# Patient Record
Sex: MALE | Race: WHITE | NOT HISPANIC OR LATINO
[De-identification: names, ages, dates, MRNs, and addresses within clinical notes are randomized per-mention and may not be internally consistent; named-entity substitution may affect disease eponyms.]

---

## 2018-11-13 PROBLEM — Z00.00 ENCOUNTER FOR PREVENTIVE HEALTH EXAMINATION: Status: ACTIVE | Noted: 2018-11-13

## 2018-11-21 ENCOUNTER — RECORD ABSTRACTING (OUTPATIENT)
Age: 78
End: 2018-11-21

## 2018-11-21 ENCOUNTER — APPOINTMENT (OUTPATIENT)
Dept: INTERNAL MEDICINE | Facility: CLINIC | Age: 78
End: 2018-11-21
Payer: MEDICARE

## 2018-11-21 VITALS — BODY MASS INDEX: 26.53 KG/M2 | HEIGHT: 67 IN | WEIGHT: 169 LBS

## 2018-11-21 VITALS — DIASTOLIC BLOOD PRESSURE: 74 MMHG | SYSTOLIC BLOOD PRESSURE: 125 MMHG

## 2018-11-21 DIAGNOSIS — Z87.09 PERSONAL HISTORY OF OTHER DISEASES OF THE RESPIRATORY SYSTEM: ICD-10-CM

## 2018-11-21 DIAGNOSIS — Z87.39 PERSONAL HISTORY OF OTHER DISEASES OF THE MUSCULOSKELETAL SYSTEM AND CONNECTIVE TISSUE: ICD-10-CM

## 2018-11-21 DIAGNOSIS — M10.9 GOUT, UNSPECIFIED: ICD-10-CM

## 2018-11-21 DIAGNOSIS — Z87.898 PERSONAL HISTORY OF OTHER SPECIFIED CONDITIONS: ICD-10-CM

## 2018-11-21 DIAGNOSIS — Z87.828 PERSONAL HISTORY OF OTHER (HEALED) PHYSICAL INJURY AND TRAUMA: ICD-10-CM

## 2018-11-21 DIAGNOSIS — Z82.49 FAMILY HISTORY OF ISCHEMIC HEART DISEASE AND OTHER DISEASES OF THE CIRCULATORY SYSTEM: ICD-10-CM

## 2018-11-21 DIAGNOSIS — M25.539 PAIN IN UNSPECIFIED WRIST: ICD-10-CM

## 2018-11-21 DIAGNOSIS — B02.30 ZOSTER OCULAR DISEASE, UNSPECIFIED: ICD-10-CM

## 2018-11-21 DIAGNOSIS — Z86.39 PERSONAL HISTORY OF OTHER ENDOCRINE, NUTRITIONAL AND METABOLIC DISEASE: ICD-10-CM

## 2018-11-21 DIAGNOSIS — R07.89 OTHER CHEST PAIN: ICD-10-CM

## 2018-11-21 DIAGNOSIS — Z86.69 PERSONAL HISTORY OF OTHER DISEASES OF THE NERVOUS SYSTEM AND SENSE ORGANS: ICD-10-CM

## 2018-11-21 DIAGNOSIS — Z87.891 PERSONAL HISTORY OF NICOTINE DEPENDENCE: ICD-10-CM

## 2018-11-21 DIAGNOSIS — Z86.79 PERSONAL HISTORY OF OTHER DISEASES OF THE CIRCULATORY SYSTEM: ICD-10-CM

## 2018-11-21 LAB — INR PPP: 2 RATIO

## 2018-11-21 PROCEDURE — 99214 OFFICE O/P EST MOD 30 MIN: CPT | Mod: 25

## 2018-11-21 PROCEDURE — 85610 PROTHROMBIN TIME: CPT | Mod: QW

## 2018-11-21 RX ORDER — TRAZODONE HYDROCHLORIDE 50 MG/1
50 TABLET ORAL
Refills: 0 | Status: COMPLETED | COMMUNITY
End: 2018-11-21

## 2018-11-21 RX ORDER — TIOTROPIUM BROMIDE 18 UG/1
18 CAPSULE ORAL; RESPIRATORY (INHALATION) DAILY
Refills: 0 | Status: COMPLETED | COMMUNITY
End: 2018-11-21

## 2018-11-21 RX ORDER — DIFLORASONE DIACETATE 0.5 MG/G
0.05 CREAM TOPICAL
Refills: 0 | Status: ACTIVE | COMMUNITY

## 2018-11-21 RX ORDER — CLOBETASOL PROPIONATE 0.5 MG/G
0.05 CREAM TOPICAL
Refills: 0 | Status: ACTIVE | COMMUNITY

## 2018-11-21 RX ORDER — FLUOCINONIDE 0.5 MG/ML
0.05 SOLUTION TOPICAL TWICE DAILY
Refills: 0 | Status: ACTIVE | COMMUNITY

## 2018-11-21 RX ORDER — ALPRAZOLAM 0.5 MG/1
0.5 TABLET ORAL TWICE DAILY
Refills: 0 | Status: COMPLETED | COMMUNITY
End: 2018-11-21

## 2018-11-21 RX ORDER — SECUKINUMAB 150 MG/ML
150 INJECTION SUBCUTANEOUS
Qty: 6 | Refills: 0 | Status: ACTIVE | COMMUNITY
Start: 2018-02-05

## 2018-11-21 RX ORDER — SILDENAFIL 20 MG/1
20 TABLET ORAL
Refills: 0 | Status: COMPLETED | COMMUNITY
End: 2018-11-21

## 2018-11-21 RX ORDER — ALBUTEROL SULFATE 0.63 MG/3ML
0.63 SOLUTION RESPIRATORY (INHALATION)
Refills: 0 | Status: COMPLETED | COMMUNITY
End: 2018-11-21

## 2018-11-21 RX ORDER — SOTALOL HYDROCHLORIDE 80 MG/1
80 TABLET ORAL
Refills: 0 | Status: COMPLETED | COMMUNITY
End: 2018-11-21

## 2018-11-21 NOTE — REVIEW OF SYSTEMS
[Nocturia] : nocturia [Joint Stiffness] : joint stiffness [Back Pain] : back pain [Negative] : Heme/Lymph [Frequency] : no frequency

## 2018-11-21 NOTE — ASSESSMENT
[FreeTextEntry1] : \par Complicated patient who has been doing fairly well despite many concurrent medical issues\par He has no signs of congestive heart failure on exam and his blood pressure has been well-controlled and he feels fairly well\par His asthma and COPD has not been limiting him in any way\par His psoriasis is under much better control\par The details of his visit after the pacemaker interrogation are not yet known

## 2018-11-21 NOTE — PLAN
[FreeTextEntry1] : Continue same medications at this time\par Obtain echocardiogram to evaluate ejection fraction to determine if a defibrillator or dual-chamber pacemaker might be indicated\par Followup with Dr. Kanner\par Will obtain records from yesterday's visit which are not available at this time\par In the meantime he should follow Dr. Kanner's recommendations and stop the sotalol and take the metoprolol at the doses that were prescribed

## 2018-11-21 NOTE — PHYSICAL EXAM
[No Acute Distress] : no acute distress [Well Nourished] : well nourished [Well Developed] : well developed [Well-Appearing] : well-appearing [Normal Sclera/Conjunctiva] : normal sclera/conjunctiva [PERRL] : pupils equal round and reactive to light [Normal Outer Ear/Nose] : the outer ears and nose were normal in appearance [Normal Oropharynx] : the oropharynx was normal [No JVD] : no jugular venous distention [Supple] : supple [No Lymphadenopathy] : no lymphadenopathy [Thyroid Normal, No Nodules] : the thyroid was normal and there were no nodules present [No Respiratory Distress] : no respiratory distress  [Clear to Auscultation] : lungs were clear to auscultation bilaterally [No Accessory Muscle Use] : no accessory muscle use [Normal Rate] : normal rate  [Premature Beats] : regular with premature beats [Normal S1] : normal S1 [Distant] : the heart sounds were distant [I] : a grade 1 [No Carotid Bruits] : no carotid bruits [No Abdominal Bruit] : a ~M bruit was not heard ~T in the abdomen [No Varicosities] : no varicosities [Pedal Pulses Present] : the pedal pulses are present [No Extremity Clubbing/Cyanosis] : no extremity clubbing/cyanosis [No Palpable Aorta] : no palpable aorta [___ +] : bilateral [unfilled]U+ pretibial pitting edema [Soft] : abdomen soft [Non Tender] : non-tender [Non-distended] : non-distended [No Masses] : no abdominal mass palpated [No HSM] : no HSM [Normal Bowel Sounds] : normal bowel sounds [Normal Posterior Cervical Nodes] : no posterior cervical lymphadenopathy [Normal Anterior Cervical Nodes] : no anterior cervical lymphadenopathy [No CVA Tenderness] : no CVA  tenderness [No Spinal Tenderness] : no spinal tenderness [No Joint Swelling] : no joint swelling [Grossly Normal Strength/Tone] : grossly normal strength/tone [Normal Gait] : normal gait [Coordination Grossly Intact] : coordination grossly intact [No Focal Deficits] : no focal deficits [Deep Tendon Reflexes (DTR)] : deep tendon reflexes were 2+ and symmetric [Normal Affect] : the affect was normal [Normal Insight/Judgement] : insight and judgment were intact [Normal S2] : abnormal S2 [de-identified] : psoriasis, venous stasis changes

## 2018-11-21 NOTE — HISTORY OF PRESENT ILLNESS
[FreeTextEntry1] : upset over visit with Dr Kanner [de-identified] : sotalol stopped, metoprolol 50 started\par otherwise at baseline\par The patient was in his baseline state of health and going for his routine pacemaker followup which had been putting years ago for sick sinus syndrome and is followed at [facility] by Dr. Kanner\par He has been doing well on his current regimen and his past medical history is complicated and includes significant COPD and asthma that is well controlled on his current regimen\par He also has a history of bad psoriasis and gets injections and has been doing better since the injections\par He has a long history of coronary artery disease and has had prior myocardial infarction and stents and most recently he had a 90% large circumflex lesion that was stented and at that time his ejection fraction was 30%\par He has renal insufficiency that has been stable\par He has difficult to control hypertension that has been under good control\par He has paroxysmal atrial fibrillation and his INR has been well controlled and is at a therapeutic range today\par Unfortunately the records from his visit with Dr. Kanner yesterday are not available and we have called to try to get them\par He was told that he might need a different type of pacemaker and his medications were switched as above and it was recommended that he have an echocardiogram here\par Dr. Kanner had discussed that he might need a defibrillator and dual-chamber pacemaker should his ejection fraction failed to improve from when he had the stent and this most likely is what he wanted to have evaluated

## 2018-11-26 ENCOUNTER — APPOINTMENT (OUTPATIENT)
Dept: INTERNAL MEDICINE | Facility: CLINIC | Age: 78
End: 2018-11-26
Payer: MEDICARE

## 2018-11-26 PROCEDURE — 93306 TTE W/DOPPLER COMPLETE: CPT

## 2018-11-28 ENCOUNTER — APPOINTMENT (OUTPATIENT)
Dept: INTERNAL MEDICINE | Facility: CLINIC | Age: 78
End: 2018-11-28
Payer: MEDICARE

## 2018-11-28 VITALS — HEIGHT: 67 IN | SYSTOLIC BLOOD PRESSURE: 126 MMHG | BODY MASS INDEX: 26.31 KG/M2 | DIASTOLIC BLOOD PRESSURE: 76 MMHG

## 2018-11-28 VITALS — WEIGHT: 168 LBS | BODY MASS INDEX: 26.31 KG/M2

## 2018-11-28 LAB — INR PPP: 3.5 RATIO

## 2018-11-28 PROCEDURE — 99214 OFFICE O/P EST MOD 30 MIN: CPT | Mod: 25

## 2018-11-28 PROCEDURE — 85610 PROTHROMBIN TIME: CPT | Mod: QW

## 2018-11-28 NOTE — PHYSICAL EXAM
[No Acute Distress] : no acute distress [Well Nourished] : well nourished [Well Developed] : well developed [Well-Appearing] : well-appearing [Normal Sclera/Conjunctiva] : normal sclera/conjunctiva [PERRL] : pupils equal round and reactive to light [Normal Outer Ear/Nose] : the outer ears and nose were normal in appearance [Normal Oropharynx] : the oropharynx was normal [No JVD] : no jugular venous distention [Supple] : supple [No Lymphadenopathy] : no lymphadenopathy [Thyroid Normal, No Nodules] : the thyroid was normal and there were no nodules present [No Respiratory Distress] : no respiratory distress  [Clear to Auscultation] : lungs were clear to auscultation bilaterally [No Accessory Muscle Use] : no accessory muscle use [Normal Rate] : normal rate  [Premature Beats] : regular with premature beats [Normal S1] : normal S1 [Distant] : the heart sounds were distant [I] : a grade 1 [No Carotid Bruits] : no carotid bruits [No Abdominal Bruit] : a ~M bruit was not heard ~T in the abdomen [No Varicosities] : no varicosities [Pedal Pulses Present] : the pedal pulses are present [No Extremity Clubbing/Cyanosis] : no extremity clubbing/cyanosis [No Palpable Aorta] : no palpable aorta [___ +] : bilateral [unfilled]U+ pretibial pitting edema [Soft] : abdomen soft [Non Tender] : non-tender [Non-distended] : non-distended [No Masses] : no abdominal mass palpated [No HSM] : no HSM [Normal Bowel Sounds] : normal bowel sounds [Normal Sphincter Tone] : normal sphincter tone [No Mass] : no mass [Normal Posterior Cervical Nodes] : no posterior cervical lymphadenopathy [Normal Anterior Cervical Nodes] : no anterior cervical lymphadenopathy [No CVA Tenderness] : no CVA  tenderness [No Spinal Tenderness] : no spinal tenderness [No Joint Swelling] : no joint swelling [Grossly Normal Strength/Tone] : grossly normal strength/tone [Normal Gait] : normal gait [Coordination Grossly Intact] : coordination grossly intact [No Focal Deficits] : no focal deficits [Deep Tendon Reflexes (DTR)] : deep tendon reflexes were 2+ and symmetric [Normal Affect] : the affect was normal [Normal Insight/Judgement] : insight and judgment were intact [Normal S2] : abnormal S2 [Stool Occult Blood] : stool negative for occult blood [de-identified] : psoriasis, venous stasis changes

## 2018-11-28 NOTE — REVIEW OF SYSTEMS
[Dyspnea on Exertion] : dyspnea on exertion [Diarrhea] : diarrhea [Nocturia] : nocturia [Joint Stiffness] : joint stiffness [Back Pain] : back pain [Negative] : Heme/Lymph [Shortness Of Breath] : no shortness of breath [Wheezing] : no wheezing [Cough] : no cough [Abdominal Pain] : no abdominal pain [Nausea] : no nausea [Constipation] : no constipation [Vomiting] : no vomiting [Heartburn] : no heartburn [Melena] : no melena [Frequency] : no frequency

## 2018-11-28 NOTE — HISTORY OF PRESENT ILLNESS
[FreeTextEntry1] : diarrhea, dyspnea [de-identified] : The patient notes that over the last 6 days he has felt poorly with more shortness of breath on exertion although this hasn't been too bad as well as GI upset and diarrhea\par He says that the diarrhea can be watery and occurs 4-5 times a day\par He denies any recent travel or any suspicious meals and any restaurants or elsewhere\par He feels that this began one day after he started the metoprolol and stopped the sotalol\par His appetite has been okay and he denies abdominal pain other than some cramping when he gets the episodes of diarrhea\par He has remained well hydrated\par He states that he has not had any recent antibiotics\par He has a complex long extensive history of COPD which has been well-controlled as well as psoriasis for which he gets injections and a long history of coronary artery disease and past myocardial infarction\par Most recently he had a stent to a large 90% circumflex lesion and his ejection fraction at that time was 30%\par He underwent an echocardiogram a few days ago in which his ejection fraction was estimated at 35-40% with significant lack of synchronous it he in the ventricular contraction\par He sees Dr. Kanner for his pacemaker followup\par He denies chest pain or angina\par His pacemaker was put in for sick sinus syndrome several years ago\par His INR is 3.5 today and he denies any recent antibiotic usage

## 2018-11-28 NOTE — ASSESSMENT
[FreeTextEntry1] : Diarrhea in an adult patient that he feels coincides with his starting metoprolol but I suspect that that is a coincidence\par He is not ill-appearing and is not dehydrated with good skin turgor and good appetite\par He has not had any recent antibiotics\par His lungs are clear and his asthma and COPD have been very well controlled on his current regimen\par He does not appear to be in congestive heart failure and has no angina\par The reason that his sotalol was changed to metoprolol is still not been communicated to me with certainty\par His INR is high likely related to his diarrhea

## 2018-11-28 NOTE — PLAN
[FreeTextEntry1] : Continue current medications for now\par Send off stool tests to look for cause of diarrhea\par [place] diet for the next day or 2\par If fails to improve will stop metoprolol and resume sotalol\par Get results from Dr. Kanner these were requested last week but have not yet been received\par Reduce warfarin over the next week patient to hold her  tonight and then resume low schedule and come back in 5 days to be reevaluated for his protime\par

## 2018-11-30 ENCOUNTER — RX CHANGE (OUTPATIENT)
Age: 78
End: 2018-11-30

## 2018-11-30 LAB — GI PCR PANEL, STOOL: NORMAL

## 2018-11-30 RX ORDER — METOPROLOL SUCCINATE 50 MG/1
50 TABLET, EXTENDED RELEASE ORAL
Qty: 30 | Refills: 0 | Status: COMPLETED | COMMUNITY
Start: 2018-11-20 | End: 2018-11-30

## 2018-12-03 ENCOUNTER — APPOINTMENT (OUTPATIENT)
Dept: INTERNAL MEDICINE | Facility: CLINIC | Age: 78
End: 2018-12-03
Payer: MEDICARE

## 2018-12-03 VITALS — WEIGHT: 164 LBS | HEIGHT: 67 IN | BODY MASS INDEX: 25.74 KG/M2

## 2018-12-03 VITALS — SYSTOLIC BLOOD PRESSURE: 130 MMHG | DIASTOLIC BLOOD PRESSURE: 70 MMHG

## 2018-12-03 DIAGNOSIS — R19.7 DIARRHEA, UNSPECIFIED: ICD-10-CM

## 2018-12-03 LAB — INR PPP: 3 RATIO

## 2018-12-03 PROCEDURE — 85610 PROTHROMBIN TIME: CPT | Mod: QW

## 2018-12-03 PROCEDURE — 99214 OFFICE O/P EST MOD 30 MIN: CPT | Mod: 25

## 2018-12-03 NOTE — PLAN
[FreeTextEntry1] : Continue Coumadin at prior dosage of one half pill 3 times a week\par Continue all other medications\par No longer give patient metoprolol to do his inability to tolerate this medication\par Continue other medications same dosages\par Recheck INR next week

## 2018-12-03 NOTE — ASSESSMENT
[FreeTextEntry1] : GI symptoms of diarrhea and other symptoms the patient developed all resolved immediately upon stopping the metoprolol and restarting his sotalol at the prior dose INR is 3.0 today\par Dyspnea on exertion is a bit better but still present\par Records from Dr. Kanner were received\par Blood pressure is well controlled\par COPD stable\par Question remains as to whether biventricular pacing would be beneficial

## 2018-12-03 NOTE — PHYSICAL EXAM
[No Acute Distress] : no acute distress [Well Nourished] : well nourished [Well Developed] : well developed [Well-Appearing] : well-appearing [Normal Sclera/Conjunctiva] : normal sclera/conjunctiva [PERRL] : pupils equal round and reactive to light [Normal Outer Ear/Nose] : the outer ears and nose were normal in appearance [Normal Oropharynx] : the oropharynx was normal [No JVD] : no jugular venous distention [Supple] : supple [No Lymphadenopathy] : no lymphadenopathy [Thyroid Normal, No Nodules] : the thyroid was normal and there were no nodules present [No Respiratory Distress] : no respiratory distress  [Clear to Auscultation] : lungs were clear to auscultation bilaterally [No Accessory Muscle Use] : no accessory muscle use [Normal Rate] : normal rate  [Premature Beats] : regular with premature beats [Normal S1] : normal S1 [Distant] : the heart sounds were distant [I] : a grade 1 [No Carotid Bruits] : no carotid bruits [No Abdominal Bruit] : a ~M bruit was not heard ~T in the abdomen [No Varicosities] : no varicosities [Pedal Pulses Present] : the pedal pulses are present [No Extremity Clubbing/Cyanosis] : no extremity clubbing/cyanosis [No Palpable Aorta] : no palpable aorta [___ +] : bilateral [unfilled]U+ pretibial pitting edema [Soft] : abdomen soft [Non Tender] : non-tender [Non-distended] : non-distended [No Masses] : no abdominal mass palpated [No HSM] : no HSM [Normal Bowel Sounds] : normal bowel sounds [Normal Posterior Cervical Nodes] : no posterior cervical lymphadenopathy [Normal Anterior Cervical Nodes] : no anterior cervical lymphadenopathy [No CVA Tenderness] : no CVA  tenderness [No Spinal Tenderness] : no spinal tenderness [No Joint Swelling] : no joint swelling [Grossly Normal Strength/Tone] : grossly normal strength/tone [Normal Gait] : normal gait [Coordination Grossly Intact] : coordination grossly intact [No Focal Deficits] : no focal deficits [Deep Tendon Reflexes (DTR)] : deep tendon reflexes were 2+ and symmetric [Normal Affect] : the affect was normal [Normal Insight/Judgement] : insight and judgment were intact [Normal S2] : abnormal S2 [Stool Occult Blood] : stool negative for occult blood [de-identified] : psoriasis, venous stasis changes

## 2018-12-03 NOTE — HISTORY OF PRESENT ILLNESS
[FreeTextEntry1] : The patient is here today to follow up on multiple ongoing condition [de-identified] : The patient felt worse with more shortness of breath on exertion and gastrointestinal upset and diarrhea since his sotalol was changed to metoprolol by Dr. Kanner\par He had a GI PCR test that was negative\par He has had no recent antibiotics or any recent travel\par He has a long extensive history of COPD which has been well controlled on his current regimen and he sees a lung specialist\par He has a long history of coronary artery disease and past myocardial infarction and most recently in April of this year had a stent to a large 90% circumflex lesion with an ejection fraction at that time estimated at 30%\par An echocardiogram done within the last 2 weeks estimated ejection fraction of 35-40% although there was a significant lack of synchronicity of the ventricular contraction\par He has a history of renal insufficiency which has been stable\par INR 3.0 today on warfarin for past PAF

## 2018-12-03 NOTE — REVIEW OF SYSTEMS
[Nocturia] : nocturia [Joint Stiffness] : joint stiffness [Back Pain] : back pain [Negative] : Heme/Lymph [Dyspnea on Exertion] : dyspnea on exertion [Shortness Of Breath] : no shortness of breath [Wheezing] : no wheezing [Cough] : no cough [Abdominal Pain] : no abdominal pain [Nausea] : no nausea [Constipation] : no constipation [Diarrhea] : no diarrhea [Vomiting] : no vomiting [Heartburn] : no heartburn [Melena] : no melena [Frequency] : no frequency [FreeTextEntry6] : Mild BIANCHI

## 2018-12-07 ENCOUNTER — APPOINTMENT (OUTPATIENT)
Dept: INTERNAL MEDICINE | Facility: CLINIC | Age: 78
End: 2018-12-07

## 2018-12-14 ENCOUNTER — APPOINTMENT (OUTPATIENT)
Dept: INTERNAL MEDICINE | Facility: CLINIC | Age: 78
End: 2018-12-14

## 2018-12-19 ENCOUNTER — APPOINTMENT (OUTPATIENT)
Dept: INTERNAL MEDICINE | Facility: CLINIC | Age: 78
End: 2018-12-19
Payer: MEDICARE

## 2018-12-19 VITALS
RESPIRATION RATE: 20 BRPM | HEART RATE: 68 BPM | DIASTOLIC BLOOD PRESSURE: 61 MMHG | TEMPERATURE: 97.5 F | OXYGEN SATURATION: 95 % | SYSTOLIC BLOOD PRESSURE: 147 MMHG

## 2018-12-19 VITALS — HEIGHT: 67 IN | WEIGHT: 163 LBS | BODY MASS INDEX: 25.58 KG/M2

## 2018-12-19 DIAGNOSIS — M54.16 RADICULOPATHY, LUMBAR REGION: ICD-10-CM

## 2018-12-19 LAB — INR PPP: 2.1 RATIO

## 2018-12-19 PROCEDURE — 85610 PROTHROMBIN TIME: CPT | Mod: QW

## 2018-12-19 PROCEDURE — 99214 OFFICE O/P EST MOD 30 MIN: CPT | Mod: 25

## 2018-12-19 PROCEDURE — 36415 COLL VENOUS BLD VENIPUNCTURE: CPT

## 2018-12-19 NOTE — ASSESSMENT
[FreeTextEntry1] : Patient looks well but feels overall fatigued and lack of energy and not as good as he like to feel\par He has no signs of congestive heart failure on exam\par His blood pressure is slightly high and he does not seem to be overmedicated\par His lungs are clear and his asthma seems to be in active\par He is back on his sotalol

## 2018-12-19 NOTE — PLAN
[FreeTextEntry1] : Continue same dose of warfarin\par Check bloods\par Suggest nuclear MUGA to get ejection fraction to help determine if biventricular pacing might help improve his functional status by improving his cardiac output

## 2018-12-19 NOTE — PHYSICAL EXAM
[No Acute Distress] : no acute distress [Well Nourished] : well nourished [Well Developed] : well developed [Well-Appearing] : well-appearing [Normal Sclera/Conjunctiva] : normal sclera/conjunctiva [PERRL] : pupils equal round and reactive to light [Normal Outer Ear/Nose] : the outer ears and nose were normal in appearance [Normal Oropharynx] : the oropharynx was normal [No JVD] : no jugular venous distention [Supple] : supple [No Lymphadenopathy] : no lymphadenopathy [Thyroid Normal, No Nodules] : the thyroid was normal and there were no nodules present [No Respiratory Distress] : no respiratory distress  [Clear to Auscultation] : lungs were clear to auscultation bilaterally [No Accessory Muscle Use] : no accessory muscle use [Normal Rate] : normal rate  [Premature Beats] : regular with premature beats [Normal S1] : normal S1 [Distant] : the heart sounds were distant [I] : a grade 1 [No Carotid Bruits] : no carotid bruits [No Abdominal Bruit] : a ~M bruit was not heard ~T in the abdomen [No Varicosities] : no varicosities [Pedal Pulses Present] : the pedal pulses are present [No Extremity Clubbing/Cyanosis] : no extremity clubbing/cyanosis [No Palpable Aorta] : no palpable aorta [___ +] : bilateral [unfilled]U+ pretibial pitting edema [Soft] : abdomen soft [Non Tender] : non-tender [Non-distended] : non-distended [No Masses] : no abdominal mass palpated [No HSM] : no HSM [Normal Bowel Sounds] : normal bowel sounds [Normal Posterior Cervical Nodes] : no posterior cervical lymphadenopathy [Normal Anterior Cervical Nodes] : no anterior cervical lymphadenopathy [No CVA Tenderness] : no CVA  tenderness [No Spinal Tenderness] : no spinal tenderness [No Joint Swelling] : no joint swelling [Grossly Normal Strength/Tone] : grossly normal strength/tone [Normal Gait] : normal gait [Coordination Grossly Intact] : coordination grossly intact [No Focal Deficits] : no focal deficits [Deep Tendon Reflexes (DTR)] : deep tendon reflexes were 2+ and symmetric [Normal Affect] : the affect was normal [Normal Insight/Judgement] : insight and judgment were intact [Normal S2] : abnormal S2 [Stool Occult Blood] : stool negative for occult blood [de-identified] : psoriasis, venous stasis changes

## 2018-12-19 NOTE — REVIEW OF SYSTEMS
[Dyspnea on Exertion] : dyspnea on exertion [Nocturia] : nocturia [Joint Stiffness] : joint stiffness [Back Pain] : back pain [Negative] : Heme/Lymph [Shortness Of Breath] : no shortness of breath [Wheezing] : no wheezing [Cough] : no cough [Abdominal Pain] : no abdominal pain [Nausea] : no nausea [Constipation] : no constipation [Diarrhea] : no diarrhea [Vomiting] : no vomiting [Heartburn] : no heartburn [Melena] : no melena [Frequency] : no frequency [FreeTextEntry6] : Mild BIANCHI

## 2018-12-19 NOTE — HISTORY OF PRESENT ILLNESS
[FreeTextEntry1] : here today to follow up on multiple ongoing conditions [de-identified] : felt worse with more shortness of breath on exertion and gastrointestinal upset and diarrhea since his sotalol was changed to metoprolol by Dr. Kanner, had a GI PCR test that was negative, had no recent antibiotics or any recent travel, All symptoms resolved when his metoprolol was switched back to social\par  long history of COPD and asthma which has been well controlled on his current regimen and he sees a lung specialist\par history of coronary artery disease and past myocardial infarction and most recently in April of this year had a stent to a large 90% circumflex lesion with an ejection fraction at that time estimated at 30%\par An echocardiogram done estimated ejection fraction of 35-40% although there was a significant lack of synchronicity of the ventricular contraction\par history of renal insufficiency which has been stable\par INR 2.1 today on warfarin for past PAF

## 2018-12-20 ENCOUNTER — RESULT REVIEW (OUTPATIENT)
Age: 78
End: 2018-12-20

## 2018-12-20 LAB
ALBUMIN SERPL ELPH-MCNC: 4.1 G/DL
ALP BLD-CCNC: 79 U/L
ALT SERPL-CCNC: 14 U/L
ANION GAP SERPL CALC-SCNC: 14 MMOL/L
AST SERPL-CCNC: 17 U/L
BASOPHILS # BLD AUTO: 0.01 K/UL
BASOPHILS NFR BLD AUTO: 0.1 %
BILIRUB SERPL-MCNC: 0.3 MG/DL
BUN SERPL-MCNC: 43 MG/DL
CALCIUM SERPL-MCNC: 9.3 MG/DL
CHLORIDE SERPL-SCNC: 105 MMOL/L
CO2 SERPL-SCNC: 27 MMOL/L
CREAT SERPL-MCNC: 1.84 MG/DL
EOSINOPHIL # BLD AUTO: 0.48 K/UL
EOSINOPHIL NFR BLD AUTO: 6.8 %
GLUCOSE SERPL-MCNC: 99 MG/DL
HCT VFR BLD CALC: 36.1 %
HGB BLD-MCNC: 11.4 G/DL
IMM GRANULOCYTES NFR BLD AUTO: 0.1 %
LYMPHOCYTES # BLD AUTO: 0.99 K/UL
LYMPHOCYTES NFR BLD AUTO: 14 %
MAN DIFF?: NORMAL
MCHC RBC-ENTMCNC: 29.5 PG
MCHC RBC-ENTMCNC: 31.6 GM/DL
MCV RBC AUTO: 93.5 FL
MONOCYTES # BLD AUTO: 0.55 K/UL
MONOCYTES NFR BLD AUTO: 7.8 %
NEUTROPHILS # BLD AUTO: 5.03 K/UL
NEUTROPHILS NFR BLD AUTO: 71.2 %
NT-PROBNP SERPL-MCNC: 4905 PG/ML
PLATELET # BLD AUTO: 215 K/UL
POTASSIUM SERPL-SCNC: 4.7 MMOL/L
PROT SERPL-MCNC: 6.5 G/DL
RBC # BLD: 3.86 M/UL
RBC # FLD: 14.3 %
SODIUM SERPL-SCNC: 146 MMOL/L
TSH SERPL-ACNC: 3.46 UIU/ML
WBC # FLD AUTO: 7.07 K/UL

## 2019-01-23 ENCOUNTER — APPOINTMENT (OUTPATIENT)
Dept: INTERNAL MEDICINE | Facility: CLINIC | Age: 79
End: 2019-01-23
Payer: MEDICARE

## 2019-01-23 VITALS — SYSTOLIC BLOOD PRESSURE: 130 MMHG | DIASTOLIC BLOOD PRESSURE: 70 MMHG

## 2019-01-23 VITALS — WEIGHT: 164 LBS | HEIGHT: 67 IN | BODY MASS INDEX: 25.74 KG/M2

## 2019-01-23 LAB — INR PPP: 2.1 RATIO

## 2019-01-23 PROCEDURE — 85610 PROTHROMBIN TIME: CPT | Mod: QW

## 2019-01-23 PROCEDURE — 99214 OFFICE O/P EST MOD 30 MIN: CPT | Mod: 25

## 2019-01-23 NOTE — PHYSICAL EXAM
[No Acute Distress] : no acute distress [Well Nourished] : well nourished [Well Developed] : well developed [Well-Appearing] : well-appearing [Normal Sclera/Conjunctiva] : normal sclera/conjunctiva [PERRL] : pupils equal round and reactive to light [Normal Outer Ear/Nose] : the outer ears and nose were normal in appearance [Normal Oropharynx] : the oropharynx was normal [No JVD] : no jugular venous distention [Supple] : supple [No Lymphadenopathy] : no lymphadenopathy [Thyroid Normal, No Nodules] : the thyroid was normal and there were no nodules present [No Respiratory Distress] : no respiratory distress  [Clear to Auscultation] : lungs were clear to auscultation bilaterally [No Accessory Muscle Use] : no accessory muscle use [Normal Rate] : normal rate  [Premature Beats] : regular with premature beats [Normal S1] : normal S1 [Distant] : the heart sounds were distant [I] : a grade 1 [No Carotid Bruits] : no carotid bruits [No Abdominal Bruit] : a ~M bruit was not heard ~T in the abdomen [No Varicosities] : no varicosities [Pedal Pulses Present] : the pedal pulses are present [No Extremity Clubbing/Cyanosis] : no extremity clubbing/cyanosis [No Palpable Aorta] : no palpable aorta [___ +] : bilateral [unfilled]U+ pretibial pitting edema [Soft] : abdomen soft [Non Tender] : non-tender [Non-distended] : non-distended [No Masses] : no abdominal mass palpated [No HSM] : no HSM [Normal Bowel Sounds] : normal bowel sounds [Normal Posterior Cervical Nodes] : no posterior cervical lymphadenopathy [Normal Anterior Cervical Nodes] : no anterior cervical lymphadenopathy [No CVA Tenderness] : no CVA  tenderness [No Spinal Tenderness] : no spinal tenderness [No Joint Swelling] : no joint swelling [Grossly Normal Strength/Tone] : grossly normal strength/tone [Normal Gait] : normal gait [Coordination Grossly Intact] : coordination grossly intact [No Focal Deficits] : no focal deficits [Deep Tendon Reflexes (DTR)] : deep tendon reflexes were 2+ and symmetric [Normal Affect] : the affect was normal [Normal Insight/Judgement] : insight and judgment were intact [Normal S2] : abnormal S2 [Stool Occult Blood] : stool negative for occult blood [de-identified] : psoriasis, venous stasis changes

## 2019-01-23 NOTE — ASSESSMENT
[FreeTextEntry1] : looks well \par  no signs of congestive heart failure on exam\par blood pressure is excellent\par Has COPD, contributes to dyspnea\par INR perfect

## 2019-01-23 NOTE — HISTORY OF PRESENT ILLNESS
[FreeTextEntry1] : here today to follow up multiple ongoing conditions [de-identified] :  shortness of breath on exertion seems same\par  gastrointestinal upset and diarrhea since his sotalol was changed to metoprolol by Dr. Kanner, had a GI PCR test that was negative, had no recent antibiotics or any recent travel, All symptoms resolved when his metoprolol was switched back\par it has not returned\par  long history of COPD and asthma which is fairly well controlled on his current regimen, sees a lung specialist\par recent MUGA EF 47%\par \par history of coronary artery disease and past myocardial infarction and most recently in April of this year had a stent to a large 90% circumflex lesion with an ejection fraction at that time estimated at 30%\par An echocardiogram was done estimated ejection fraction of 35-40%, although there was a significant lack of synchronicity of the ventricular contraction\par history of renal insufficiency which has been stable\par INR 2.1 today on warfarin for past PAF

## 2019-01-23 NOTE — PLAN
[FreeTextEntry1] : Continue current medications for now\par Followup EPS in a few months\par Continue close followup

## 2019-01-23 NOTE — REVIEW OF SYSTEMS
[Dyspnea on Exertion] : dyspnea on exertion [Nocturia] : nocturia [Joint Stiffness] : joint stiffness [Back Pain] : back pain [Itching] : itching [Negative] : Heme/Lymph [Shortness Of Breath] : no shortness of breath [Wheezing] : no wheezing [Cough] : no cough [Abdominal Pain] : no abdominal pain [Nausea] : no nausea [Constipation] : no constipation [Diarrhea] : no diarrhea [Vomiting] : no vomiting [Heartburn] : no heartburn [Melena] : no melena [Frequency] : no frequency [FreeTextEntry6] : Mild BIANCHI stable

## 2019-01-25 ENCOUNTER — RX RENEWAL (OUTPATIENT)
Age: 79
End: 2019-01-25

## 2019-01-29 ENCOUNTER — APPOINTMENT (OUTPATIENT)
Dept: INTERNAL MEDICINE | Facility: CLINIC | Age: 79
End: 2019-01-29

## 2019-02-18 ENCOUNTER — RX RENEWAL (OUTPATIENT)
Age: 79
End: 2019-02-18

## 2019-03-05 ENCOUNTER — APPOINTMENT (OUTPATIENT)
Dept: INTERNAL MEDICINE | Facility: CLINIC | Age: 79
End: 2019-03-05
Payer: MEDICARE

## 2019-03-05 VITALS — SYSTOLIC BLOOD PRESSURE: 120 MMHG | DIASTOLIC BLOOD PRESSURE: 76 MMHG

## 2019-03-05 VITALS — WEIGHT: 161 LBS | HEIGHT: 67 IN | BODY MASS INDEX: 25.27 KG/M2

## 2019-03-05 LAB — INR PPP: 2.6 RATIO

## 2019-03-05 PROCEDURE — 99214 OFFICE O/P EST MOD 30 MIN: CPT | Mod: 25

## 2019-03-05 PROCEDURE — 85610 PROTHROMBIN TIME: CPT | Mod: QW

## 2019-03-05 NOTE — ASSESSMENT
[FreeTextEntry1] : looks well \par  no signs of congestive heart failure on exam\par blood pressure is excellent\par Has COPD but lungs sound great\par INR perfect

## 2019-03-05 NOTE — HISTORY OF PRESENT ILLNESS
[FreeTextEntry1] : here today to follow up multiple ongoing conditions [de-identified] :  shortness of breath on exertion , pt feels it is stable\par  gastrointestinal upset and diarrhea from metoprolol has not recurred on sotalol\par  long history of COPD and asthma which is very well controlled on his current regimen, sees a lung specialist Roberts as needed\par no recent visits\par recent MUGA EF 47%\par history of coronary artery disease and past myocardial infarction and most recently in April of this year had a stent to a large 90% circumflex lesion with an ejection fraction at that time estimated at 30%\par An echocardiogram was done estimating an ejection fraction of 35-40%, there was a significant lack of synchronicity of the ventricular contraction\par history of renal insufficiency which has been stable\par INR 2.3 today on warfarin for past PAF

## 2019-03-05 NOTE — PHYSICAL EXAM
[No Acute Distress] : no acute distress [Well Nourished] : well nourished [Well Developed] : well developed [Well-Appearing] : well-appearing [Normal Sclera/Conjunctiva] : normal sclera/conjunctiva [PERRL] : pupils equal round and reactive to light [Normal Outer Ear/Nose] : the outer ears and nose were normal in appearance [Normal Oropharynx] : the oropharynx was normal [No JVD] : no jugular venous distention [Supple] : supple [No Lymphadenopathy] : no lymphadenopathy [Thyroid Normal, No Nodules] : the thyroid was normal and there were no nodules present [No Respiratory Distress] : no respiratory distress  [Clear to Auscultation] : lungs were clear to auscultation bilaterally [No Accessory Muscle Use] : no accessory muscle use [Normal Rate] : normal rate  [Premature Beats] : regular with premature beats [Normal S1] : normal S1 [Distant] : the heart sounds were distant [I] : a grade 1 [No Carotid Bruits] : no carotid bruits [No Abdominal Bruit] : a ~M bruit was not heard ~T in the abdomen [No Varicosities] : no varicosities [Pedal Pulses Present] : the pedal pulses are present [No Extremity Clubbing/Cyanosis] : no extremity clubbing/cyanosis [No Palpable Aorta] : no palpable aorta [___ +] : bilateral [unfilled]U+ pretibial pitting edema [Soft] : abdomen soft [Non Tender] : non-tender [Non-distended] : non-distended [No Masses] : no abdominal mass palpated [No HSM] : no HSM [Normal Bowel Sounds] : normal bowel sounds [Normal Posterior Cervical Nodes] : no posterior cervical lymphadenopathy [Normal Anterior Cervical Nodes] : no anterior cervical lymphadenopathy [No CVA Tenderness] : no CVA  tenderness [No Spinal Tenderness] : no spinal tenderness [No Joint Swelling] : no joint swelling [Grossly Normal Strength/Tone] : grossly normal strength/tone [Normal Gait] : normal gait [Coordination Grossly Intact] : coordination grossly intact [No Focal Deficits] : no focal deficits [Deep Tendon Reflexes (DTR)] : deep tendon reflexes were 2+ and symmetric [Normal Affect] : the affect was normal [Normal Insight/Judgement] : insight and judgment were intact [Normal S2] : abnormal S2 [Stool Occult Blood] : stool negative for occult blood [de-identified] : psoriasis, venous stasis changes

## 2019-03-05 NOTE — REVIEW OF SYSTEMS
[Dyspnea on Exertion] : dyspnea on exertion [Nocturia] : nocturia [Joint Stiffness] : joint stiffness [Back Pain] : back pain [Itching] : itching [Negative] : Heme/Lymph [Shortness Of Breath] : no shortness of breath [Wheezing] : no wheezing [Cough] : no cough [Abdominal Pain] : no abdominal pain [Nausea] : no nausea [Constipation] : no constipation [Diarrhea] : no diarrhea [Vomiting] : no vomiting [Heartburn] : no heartburn [Melena] : no melena [Frequency] : no frequency [FreeTextEntry6] : BIANCHI stable

## 2019-04-03 ENCOUNTER — APPOINTMENT (OUTPATIENT)
Dept: INTERNAL MEDICINE | Facility: CLINIC | Age: 79
End: 2019-04-03
Payer: MEDICARE

## 2019-04-03 VITALS — SYSTOLIC BLOOD PRESSURE: 130 MMHG | DIASTOLIC BLOOD PRESSURE: 70 MMHG

## 2019-04-03 VITALS — HEIGHT: 67 IN | WEIGHT: 158 LBS | BODY MASS INDEX: 24.8 KG/M2

## 2019-04-03 LAB — INR PPP: 2.6 RATIO

## 2019-04-03 PROCEDURE — 99214 OFFICE O/P EST MOD 30 MIN: CPT | Mod: 25

## 2019-04-03 PROCEDURE — 85610 PROTHROMBIN TIME: CPT | Mod: QW

## 2019-04-03 NOTE — PLAN
[FreeTextEntry1] : same meds\par Order blood tests to check thyroid pancreas etc. as well as blood counts\par If weight loss continues will need GI evaluation and possibly CAT scan

## 2019-04-03 NOTE — PHYSICAL EXAM
[No Acute Distress] : no acute distress [Well Nourished] : well nourished [Well Developed] : well developed [Well-Appearing] : well-appearing [Normal Sclera/Conjunctiva] : normal sclera/conjunctiva [PERRL] : pupils equal round and reactive to light [Normal Outer Ear/Nose] : the outer ears and nose were normal in appearance [Normal Oropharynx] : the oropharynx was normal [No JVD] : no jugular venous distention [Supple] : supple [No Lymphadenopathy] : no lymphadenopathy [Thyroid Normal, No Nodules] : the thyroid was normal and there were no nodules present [No Respiratory Distress] : no respiratory distress  [Clear to Auscultation] : lungs were clear to auscultation bilaterally [No Accessory Muscle Use] : no accessory muscle use [Normal Rate] : normal rate  [Premature Beats] : regular with premature beats [Normal S1] : normal S1 [Normal S2] : abnormal S2 [Distant] : the heart sounds were distant [I] : a grade 1 [No Carotid Bruits] : no carotid bruits [No Abdominal Bruit] : a ~M bruit was not heard ~T in the abdomen [No Varicosities] : no varicosities [Pedal Pulses Present] : the pedal pulses are present [No Extremity Clubbing/Cyanosis] : no extremity clubbing/cyanosis [No Palpable Aorta] : no palpable aorta [___ +] : bilateral [unfilled]U+ pretibial pitting edema [Soft] : abdomen soft [Non Tender] : non-tender [Non-distended] : non-distended [No Masses] : no abdominal mass palpated [No HSM] : no HSM [Normal Bowel Sounds] : normal bowel sounds [Stool Occult Blood] : stool negative for occult blood [Normal Supraclavicular Nodes] : no supraclavicular lymphadenopathy [Normal Axillary Nodes] : no axillary lymphadenopathy [Normal Posterior Cervical Nodes] : no posterior cervical lymphadenopathy [Normal Femoral Nodes] : no femoral lymphadenopathy [Normal Anterior Cervical Nodes] : no anterior cervical lymphadenopathy [Normal Inguinal Nodes] : no inguinal lymphadenopathy [No CVA Tenderness] : no CVA  tenderness [No Spinal Tenderness] : no spinal tenderness [No Joint Swelling] : no joint swelling [Grossly Normal Strength/Tone] : grossly normal strength/tone [Normal Gait] : normal gait [Coordination Grossly Intact] : coordination grossly intact [No Focal Deficits] : no focal deficits [Deep Tendon Reflexes (DTR)] : deep tendon reflexes were 2+ and symmetric [Normal Affect] : the affect was normal [Normal Insight/Judgement] : insight and judgment were intact [de-identified] : psoriasis, venous stasis changes

## 2019-04-03 NOTE — ASSESSMENT
[FreeTextEntry1] : some loss of appetite ? some depression or other issues\par  no signs of congestive heart failure on exam\par blood pressure is excellent\par  lungs sound great\par INR perfect

## 2019-04-03 NOTE — HISTORY OF PRESENT ILLNESS
[FreeTextEntry1] : here today to follow up multiple ongoing conditions [de-identified] :  shortness of breath on exertion , pt feels it is stable\par  long history of COPD and asthma which is very well controlled on his current regimen, sees a lung specialist [place] as needed with no recent visits\par recent MUGA EF 47%\par history of coronary artery disease and past myocardial infarction and most recently in April of last year had a stent to a large 90% circumflex lesion with an ejection fraction at that time estimated at 30%\par echocardiogram was done estimating an ejection fraction of 35-40%, there was a significant lack of synchronicity of the ventricular contraction\par history of renal insufficiency which has been stable\par INR 2.6 today on warfarin for past PAF\par of concern to patient is decrease in appetite and mild weight loss

## 2019-04-03 NOTE — REVIEW OF SYSTEMS
[Shortness Of Breath] : no shortness of breath [Wheezing] : no wheezing [Cough] : no cough [Dyspnea on Exertion] : not dyspnea on exertion [Abdominal Pain] : no abdominal pain [Nausea] : no nausea [Constipation] : no constipation [Diarrhea] : no diarrhea [Vomiting] : no vomiting [Heartburn] : no heartburn [Melena] : no melena [Nocturia] : nocturia [Frequency] : no frequency [Joint Stiffness] : no joint stiffness [Back Pain] : back pain [Itching] : no itching [Mole Changes] : no mole changes [Nail Changes] : no nail changes [Hair Changes] : no hair changes [Skin Rash] : no skin rash [Negative] : Heme/Lymph [FreeTextEntry7] : loss of appetite

## 2019-04-05 ENCOUNTER — MEDICATION RENEWAL (OUTPATIENT)
Age: 79
End: 2019-04-05

## 2019-04-11 ENCOUNTER — OTHER (OUTPATIENT)
Age: 79
End: 2019-04-11

## 2019-04-11 DIAGNOSIS — R63.0 ANOREXIA: ICD-10-CM

## 2019-04-12 ENCOUNTER — APPOINTMENT (OUTPATIENT)
Dept: INTERNAL MEDICINE | Facility: CLINIC | Age: 79
End: 2019-04-12
Payer: MEDICARE

## 2019-04-12 DIAGNOSIS — E29.1 TESTICULAR HYPOFUNCTION: ICD-10-CM

## 2019-04-12 PROCEDURE — 36415 COLL VENOUS BLD VENIPUNCTURE: CPT

## 2019-04-15 LAB
ALBUMIN SERPL ELPH-MCNC: 3.9 G/DL
ALP BLD-CCNC: 73 U/L
ALT SERPL-CCNC: 18 U/L
AMYLASE/CREAT SERPL: 121 U/L
ANION GAP SERPL CALC-SCNC: 14 MMOL/L
AST SERPL-CCNC: 20 U/L
BASOPHILS # BLD AUTO: 0.01 K/UL
BASOPHILS NFR BLD AUTO: 0.2 %
BILIRUB SERPL-MCNC: 0.2 MG/DL
BUN SERPL-MCNC: 54 MG/DL
CALCIUM SERPL-MCNC: 9.4 MG/DL
CANCER AG19-9 SERPL-ACNC: 35 U/ML
CHLORIDE SERPL-SCNC: 108 MMOL/L
CHOLEST SERPL-MCNC: 119 MG/DL
CHOLEST/HDLC SERPL: 2.6 RATIO
CK SERPL-CCNC: 39 U/L
CO2 SERPL-SCNC: 24 MMOL/L
CREAT SERPL-MCNC: 1.99 MG/DL
EOSINOPHIL # BLD AUTO: 0.32 K/UL
EOSINOPHIL NFR BLD AUTO: 5.6 %
ESTIMATED AVERAGE GLUCOSE: 111 MG/DL
GLUCOSE SERPL-MCNC: 89 MG/DL
HBA1C MFR BLD HPLC: 5.5 %
HCT VFR BLD CALC: 39.6 %
HDLC SERPL-MCNC: 45 MG/DL
HGB BLD-MCNC: 12.2 G/DL
IMM GRANULOCYTES NFR BLD AUTO: 0.3 %
LDLC SERPL CALC-MCNC: 60 MG/DL
LYMPHOCYTES # BLD AUTO: 1 K/UL
LYMPHOCYTES NFR BLD AUTO: 17.4 %
MAN DIFF?: NORMAL
MCHC RBC-ENTMCNC: 28.6 PG
MCHC RBC-ENTMCNC: 30.8 GM/DL
MCV RBC AUTO: 93 FL
MONOCYTES # BLD AUTO: 0.48 K/UL
MONOCYTES NFR BLD AUTO: 8.4 %
NEUTROPHILS # BLD AUTO: 3.91 K/UL
NEUTROPHILS NFR BLD AUTO: 68.1 %
NT-PROBNP SERPL-MCNC: 2282 PG/ML
PLATELET # BLD AUTO: 191 K/UL
POTASSIUM SERPL-SCNC: 4.8 MMOL/L
PROT SERPL-MCNC: 6.2 G/DL
PSA SERPL-MCNC: 1.1 NG/ML
RBC # BLD: 4.26 M/UL
RBC # FLD: 14.5 %
SODIUM SERPL-SCNC: 146 MMOL/L
T4 FREE SERPL-MCNC: 1.1 NG/DL
TRIGL SERPL-MCNC: 69 MG/DL
TSH SERPL-ACNC: 2.98 UIU/ML
WBC # FLD AUTO: 5.74 K/UL

## 2019-05-02 ENCOUNTER — APPOINTMENT (OUTPATIENT)
Dept: INTERNAL MEDICINE | Facility: CLINIC | Age: 79
End: 2019-05-02
Payer: MEDICARE

## 2019-05-02 VITALS — DIASTOLIC BLOOD PRESSURE: 70 MMHG | SYSTOLIC BLOOD PRESSURE: 136 MMHG

## 2019-05-02 VITALS — WEIGHT: 160 LBS | BODY MASS INDEX: 25.11 KG/M2 | HEIGHT: 67 IN

## 2019-05-02 LAB — INR PPP: 1.5 RATIO

## 2019-05-02 PROCEDURE — 99214 OFFICE O/P EST MOD 30 MIN: CPT | Mod: 25

## 2019-05-02 PROCEDURE — 85610 PROTHROMBIN TIME: CPT | Mod: QW

## 2019-05-02 NOTE — PHYSICAL EXAM
[No Acute Distress] : no acute distress [Well Nourished] : well nourished [Well Developed] : well developed [Well-Appearing] : well-appearing [Normal Sclera/Conjunctiva] : normal sclera/conjunctiva [PERRL] : pupils equal round and reactive to light [Normal Outer Ear/Nose] : the outer ears and nose were normal in appearance [Normal Oropharynx] : the oropharynx was normal [No JVD] : no jugular venous distention [Supple] : supple [No Lymphadenopathy] : no lymphadenopathy [Thyroid Normal, No Nodules] : the thyroid was normal and there were no nodules present [No Respiratory Distress] : no respiratory distress  [Clear to Auscultation] : lungs were clear to auscultation bilaterally [No Accessory Muscle Use] : no accessory muscle use [Normal Rate] : normal rate  [Premature Beats] : regular with premature beats [Normal S1] : normal S1 [Distant] : the heart sounds were distant [I] : a grade 1 [No Carotid Bruits] : no carotid bruits [No Abdominal Bruit] : a ~M bruit was not heard ~T in the abdomen [No Varicosities] : no varicosities [Pedal Pulses Present] : the pedal pulses are present [No Extremity Clubbing/Cyanosis] : no extremity clubbing/cyanosis [No Palpable Aorta] : no palpable aorta [___ +] : bilateral [unfilled]U+ pretibial pitting edema [Soft] : abdomen soft [Non-distended] : non-distended [Non Tender] : non-tender [No Masses] : no abdominal mass palpated [No HSM] : no HSM [Normal Bowel Sounds] : normal bowel sounds [Normal Supraclavicular Nodes] : no supraclavicular lymphadenopathy [Normal Femoral Nodes] : no femoral lymphadenopathy [Normal Axillary Nodes] : no axillary lymphadenopathy [Normal Posterior Cervical Nodes] : no posterior cervical lymphadenopathy [Normal Anterior Cervical Nodes] : no anterior cervical lymphadenopathy [No Spinal Tenderness] : no spinal tenderness [Normal Inguinal Nodes] : no inguinal lymphadenopathy [No CVA Tenderness] : no CVA  tenderness [Grossly Normal Strength/Tone] : grossly normal strength/tone [No Joint Swelling] : no joint swelling [Normal Gait] : normal gait [Coordination Grossly Intact] : coordination grossly intact [No Focal Deficits] : no focal deficits [Normal Affect] : the affect was normal [Deep Tendon Reflexes (DTR)] : deep tendon reflexes were 2+ and symmetric [Normal Insight/Judgement] : insight and judgment were intact [Normal S2] : abnormal S2 [de-identified] : psoriasis, venous stasis changes [Stool Occult Blood] : stool negative for occult blood

## 2019-05-02 NOTE — ASSESSMENT
[FreeTextEntry1] : s/p fall with head trauma on warfarin\par  no signs of congestive heart failure on exam\par blood pressure is good\par  COPD well controlled\par INR low

## 2019-05-02 NOTE — REVIEW OF SYSTEMS
[Nocturia] : nocturia [Back Pain] : back pain [Negative] : Heme/Lymph [Shortness Of Breath] : no shortness of breath [Wheezing] : no wheezing [Cough] : no cough [Dyspnea on Exertion] : not dyspnea on exertion [Abdominal Pain] : no abdominal pain [Nausea] : no nausea [Constipation] : no constipation [Diarrhea] : no diarrhea [Vomiting] : no vomiting [Heartburn] : no heartburn [Melena] : no melena [Joint Stiffness] : no joint stiffness [Frequency] : no frequency [Nail Changes] : no nail changes [Itching] : no itching [Mole Changes] : no mole changes [Skin Rash] : no skin rash [Hair Changes] : no hair changes [FreeTextEntry7] : loss of appetite

## 2019-05-02 NOTE — HISTORY OF PRESENT ILLNESS
[FreeTextEntry1] : here to follow up multiple ongoing conditions [de-identified] :  darryl mild shortness of breath on exertion \par  long history of COPD and asthma which is very well controlled on his current regimen, sees a lung specialist [place] as needed with no recent visits\par recent MUGA EF 47%\par history of coronary artery disease and past myocardial infarction and most recently in April of last year had a stent to a large 90% circumflex lesion with an ejection fraction at that time estimated at 30%\par echocardiogram was done estimating an ejection fraction of 35-40%, there was a significant lack of synchronicity of the ventricular contraction\par history of renal insufficiency which has been stable\par INR  1.5 today on warfarin for past PAF\par fell on hard floor yesterday hitting buttocks and head, no symptoms\par of concern last visit to patient was decrease in appetite and mild weight loss, this has resolved

## 2019-05-14 ENCOUNTER — APPOINTMENT (OUTPATIENT)
Dept: INTERNAL MEDICINE | Facility: CLINIC | Age: 79
End: 2019-05-14
Payer: MEDICARE

## 2019-05-14 VITALS
SYSTOLIC BLOOD PRESSURE: 124 MMHG | WEIGHT: 160 LBS | HEIGHT: 67 IN | DIASTOLIC BLOOD PRESSURE: 60 MMHG | BODY MASS INDEX: 25.11 KG/M2

## 2019-05-14 PROCEDURE — 99214 OFFICE O/P EST MOD 30 MIN: CPT | Mod: 25

## 2019-05-14 PROCEDURE — 85610 PROTHROMBIN TIME: CPT | Mod: QW

## 2019-05-14 NOTE — REVIEW OF SYSTEMS
[Nocturia] : nocturia [Back Pain] : back pain [Negative] : Heme/Lymph [Shortness Of Breath] : no shortness of breath [Cough] : no cough [Dyspnea on Exertion] : not dyspnea on exertion [Wheezing] : no wheezing [Constipation] : no constipation [Nausea] : no nausea [Abdominal Pain] : no abdominal pain [Diarrhea] : no diarrhea [Heartburn] : no heartburn [Vomiting] : no vomiting [Frequency] : no frequency [Melena] : no melena [Mole Changes] : no mole changes [Itching] : no itching [Joint Stiffness] : no joint stiffness [Hair Changes] : no hair changes [Nail Changes] : no nail changes [Skin Rash] : no skin rash [FreeTextEntry7] : loss of appetite

## 2019-05-14 NOTE — ASSESSMENT
[FreeTextEntry1] : doing very well\par INR perfect\par  no signs of congestive heart failure on exam\par blood pressure is good\par  COPD well controlled\par

## 2019-05-14 NOTE — HISTORY OF PRESENT ILLNESS
[FreeTextEntry1] : to follow up multiple ongoing conditions [de-identified] :  darryl mild shortness of breath on exertion \par  long history of COPD and asthma which is very well controlled on his current regimen, sees lung specialist [place] as needed with no recent visits\par recent MUGA EF 47%\par history of coronary artery disease and past myocardial infarction and most recently in April of last year had a stent to a large 90% circumflex lesion with an ejection fraction at that time estimated at 30%\par echocardiogram was done estimating an ejection fraction of 35-40%, there was a significant lack of synchronicity of the ventricular contraction\par history of renal insufficiency which has been stable\par no further falls since last visit\par INR 2.4 today\par appetite better

## 2019-05-14 NOTE — PHYSICAL EXAM
[No Acute Distress] : no acute distress [Well Nourished] : well nourished [Well Developed] : well developed [Well-Appearing] : well-appearing [Normal Sclera/Conjunctiva] : normal sclera/conjunctiva [PERRL] : pupils equal round and reactive to light [Normal Outer Ear/Nose] : the outer ears and nose were normal in appearance [Normal Oropharynx] : the oropharynx was normal [Supple] : supple [No JVD] : no jugular venous distention [No Lymphadenopathy] : no lymphadenopathy [Thyroid Normal, No Nodules] : the thyroid was normal and there were no nodules present [Clear to Auscultation] : lungs were clear to auscultation bilaterally [No Accessory Muscle Use] : no accessory muscle use [No Respiratory Distress] : no respiratory distress  [Normal Rate] : normal rate  [Premature Beats] : regular with premature beats [Normal S1] : normal S1 [I] : a grade 1 [Distant] : the heart sounds were distant [No Carotid Bruits] : no carotid bruits [No Abdominal Bruit] : a ~M bruit was not heard ~T in the abdomen [No Varicosities] : no varicosities [Pedal Pulses Present] : the pedal pulses are present [No Palpable Aorta] : no palpable aorta [No Extremity Clubbing/Cyanosis] : no extremity clubbing/cyanosis [___ +] : bilateral [unfilled]U+ pretibial pitting edema [Soft] : abdomen soft [Non Tender] : non-tender [No Masses] : no abdominal mass palpated [Non-distended] : non-distended [No HSM] : no HSM [Normal Bowel Sounds] : normal bowel sounds [Normal Supraclavicular Nodes] : no supraclavicular lymphadenopathy [Normal Axillary Nodes] : no axillary lymphadenopathy [Normal Posterior Cervical Nodes] : no posterior cervical lymphadenopathy [Normal Femoral Nodes] : no femoral lymphadenopathy [Normal Anterior Cervical Nodes] : no anterior cervical lymphadenopathy [Normal Inguinal Nodes] : no inguinal lymphadenopathy [No CVA Tenderness] : no CVA  tenderness [No Spinal Tenderness] : no spinal tenderness [Grossly Normal Strength/Tone] : grossly normal strength/tone [Normal Gait] : normal gait [No Joint Swelling] : no joint swelling [No Focal Deficits] : no focal deficits [Deep Tendon Reflexes (DTR)] : deep tendon reflexes were 2+ and symmetric [Coordination Grossly Intact] : coordination grossly intact [Normal Affect] : the affect was normal [Normal Insight/Judgement] : insight and judgment were intact [Normal S2] : abnormal S2 [Stool Occult Blood] : stool negative for occult blood [de-identified] : psoriasis, venous stasis changes

## 2019-06-11 ENCOUNTER — APPOINTMENT (OUTPATIENT)
Dept: INTERNAL MEDICINE | Facility: CLINIC | Age: 79
End: 2019-06-11
Payer: MEDICARE

## 2019-06-11 VITALS
BODY MASS INDEX: 24.96 KG/M2 | DIASTOLIC BLOOD PRESSURE: 60 MMHG | HEIGHT: 67 IN | WEIGHT: 159 LBS | HEART RATE: 66 BPM | SYSTOLIC BLOOD PRESSURE: 120 MMHG

## 2019-06-11 LAB — INR PPP: 2.5 RATIO

## 2019-06-11 PROCEDURE — 85610 PROTHROMBIN TIME: CPT | Mod: QW

## 2019-06-11 PROCEDURE — 99214 OFFICE O/P EST MOD 30 MIN: CPT | Mod: 25

## 2019-06-11 NOTE — ASSESSMENT
[FreeTextEntry1] : doing  well\par INR perfect\par  no congestive heart failure on exam\par blood pressure is well controlled\par  COPD well controlled\par

## 2019-06-11 NOTE — REVIEW OF SYSTEMS
[Nocturia] : nocturia [Back Pain] : back pain [Negative] : Heme/Lymph [Shortness Of Breath] : no shortness of breath [Cough] : no cough [Wheezing] : no wheezing [Dyspnea on Exertion] : not dyspnea on exertion [Abdominal Pain] : no abdominal pain [Nausea] : no nausea [Constipation] : no constipation [Vomiting] : no vomiting [Diarrhea] : no diarrhea [Melena] : no melena [Heartburn] : no heartburn [Dysuria] : no dysuria [Incontinence] : no incontinence [Hesitancy] : no hesitancy [Frequency] : no frequency [Hematuria] : no hematuria [Impotence] : no impotency [Poor Libido] : libido not poor [Joint Stiffness] : no joint stiffness [Itching] : no itching [Mole Changes] : no mole changes [Nail Changes] : no nail changes [Hair Changes] : no hair changes [Skin Rash] : no skin rash [Suicidal] : not suicidal [Insomnia] : no insomnia [Anxiety] : no anxiety [FreeTextEntry7] : loss of appetite

## 2019-06-11 NOTE — HISTORY OF PRESENT ILLNESS
[FreeTextEntry1] : to follow up multiple ongoing conditions [de-identified] :  less shortness of breath on exertion \par  long history of COPD and asthma, very well controlled on his current regimen no need to see lung specialist [place] \par recent MUGA EF 47%\par history of coronary artery disease and past myocardial infarction and most recently in April of last year had a stent to a large 90% circumflex lesion with an ejection fraction at that time estimated at 30%\par echocardiogram was done estimating an ejection fraction of 35-40%, there was a significant lack of synchronicity of the ventricular contraction\par renal insufficiency which has been stable\par no further falls since last visit\par INR 2.5 today\par appetite better\par stable mild depressive symptoms on duloxetine, declines to see psychologist or psychiatrist

## 2019-06-11 NOTE — PHYSICAL EXAM
[No Acute Distress] : no acute distress [Well Nourished] : well nourished [Well Developed] : well developed [Normal Sclera/Conjunctiva] : normal sclera/conjunctiva [PERRL] : pupils equal round and reactive to light [Well-Appearing] : well-appearing [Normal Oropharynx] : the oropharynx was normal [Normal Outer Ear/Nose] : the outer ears and nose were normal in appearance [No JVD] : no jugular venous distention [No Lymphadenopathy] : no lymphadenopathy [Supple] : supple [Thyroid Normal, No Nodules] : the thyroid was normal and there were no nodules present [No Respiratory Distress] : no respiratory distress  [Clear to Auscultation] : lungs were clear to auscultation bilaterally [No Accessory Muscle Use] : no accessory muscle use [Normal S1] : normal S1 [Premature Beats] : regular with premature beats [Normal Rate] : normal rate  [Distant] : the heart sounds were distant [No Carotid Bruits] : no carotid bruits [I] : a grade 1 [No Varicosities] : no varicosities [No Abdominal Bruit] : a ~M bruit was not heard ~T in the abdomen [No Extremity Clubbing/Cyanosis] : no extremity clubbing/cyanosis [Pedal Pulses Present] : the pedal pulses are present [No Palpable Aorta] : no palpable aorta [___ +] : bilateral [unfilled]U+ pretibial pitting edema [Non Tender] : non-tender [Soft] : abdomen soft [No Masses] : no abdominal mass palpated [Non-distended] : non-distended [No HSM] : no HSM [Normal Bowel Sounds] : normal bowel sounds [Normal Axillary Nodes] : no axillary lymphadenopathy [Normal Supraclavicular Nodes] : no supraclavicular lymphadenopathy [Normal Femoral Nodes] : no femoral lymphadenopathy [Normal Posterior Cervical Nodes] : no posterior cervical lymphadenopathy [No CVA Tenderness] : no CVA  tenderness [Normal Inguinal Nodes] : no inguinal lymphadenopathy [Normal Anterior Cervical Nodes] : no anterior cervical lymphadenopathy [No Joint Swelling] : no joint swelling [No Spinal Tenderness] : no spinal tenderness [Grossly Normal Strength/Tone] : grossly normal strength/tone [Normal Gait] : normal gait [Coordination Grossly Intact] : coordination grossly intact [No Focal Deficits] : no focal deficits [Deep Tendon Reflexes (DTR)] : deep tendon reflexes were 2+ and symmetric [Normal Affect] : the affect was normal [Normal Insight/Judgement] : insight and judgment were intact [Normal S2] : abnormal S2 [Stool Occult Blood] : stool negative for occult blood [de-identified] : psoriasis better

## 2019-07-08 ENCOUNTER — APPOINTMENT (OUTPATIENT)
Dept: INTERNAL MEDICINE | Facility: CLINIC | Age: 79
End: 2019-07-08
Payer: MEDICARE

## 2019-07-08 VITALS
BODY MASS INDEX: 24.62 KG/M2 | HEIGHT: 66.5 IN | HEART RATE: 65 BPM | OXYGEN SATURATION: 98 % | WEIGHT: 155 LBS | SYSTOLIC BLOOD PRESSURE: 127 MMHG | DIASTOLIC BLOOD PRESSURE: 73 MMHG | RESPIRATION RATE: 16 BRPM

## 2019-07-08 VITALS — DIASTOLIC BLOOD PRESSURE: 70 MMHG | SYSTOLIC BLOOD PRESSURE: 114 MMHG

## 2019-07-08 LAB — INR PPP: 3.1 RATIO

## 2019-07-08 PROCEDURE — 85610 PROTHROMBIN TIME: CPT | Mod: QW

## 2019-07-08 PROCEDURE — 99214 OFFICE O/P EST MOD 30 MIN: CPT | Mod: 25

## 2019-07-08 RX ORDER — AMLODIPINE BESYLATE 2.5 MG/1
2.5 TABLET ORAL DAILY
Qty: 90 | Refills: 3 | Status: COMPLETED | COMMUNITY
Start: 2018-04-12 | End: 2019-07-08

## 2019-07-08 NOTE — ASSESSMENT
[FreeTextEntry1] : doing  well\par INR a bit high\par no further falls\par headaches improving\par  no congestive heart failure on exam\par blood pressure is well controlled\par  COPD well controlled\par

## 2019-07-08 NOTE — HISTORY OF PRESENT ILLNESS
[FreeTextEntry1] : to follow up multiple ongoing conditions [de-identified] :  less shortness of breath on exertion \par  long history of COPD and asthma, very well controlled on his current regimen no need to see lung specialist [place] \par recent MUGA EF 47%\par history of coronary artery disease and past myocardial infarction and most recently in April of last year had a stent to a large 90% circumflex lesion with an ejection fraction at that time estimated at 30%\par echocardiogram was done estimating an ejection fraction of 35-40%, there was a significant lack of synchronicity of the ventricular contraction\par renal insufficiency which has been stable\par no further falls since last visit\par INR 3.3 today\par fell and hit head 5 1 2019, headaches not as bad that he has had since then\par not every day\par definitely oimproving\par stable mild depressive symptoms on duloxetine, declines to see psychologist or psychiatrist

## 2019-07-08 NOTE — PHYSICAL EXAM
[Well Developed] : well developed [No Acute Distress] : no acute distress [Well Nourished] : well nourished [Well-Appearing] : well-appearing [PERRL] : pupils equal round and reactive to light [Normal Sclera/Conjunctiva] : normal sclera/conjunctiva [Normal Outer Ear/Nose] : the outer ears and nose were normal in appearance [Normal Oropharynx] : the oropharynx was normal [No JVD] : no jugular venous distention [Supple] : supple [No Lymphadenopathy] : no lymphadenopathy [Thyroid Normal, No Nodules] : the thyroid was normal and there were no nodules present [No Respiratory Distress] : no respiratory distress  [Clear to Auscultation] : lungs were clear to auscultation bilaterally [No Accessory Muscle Use] : no accessory muscle use [Normal Rate] : normal rate  [Normal S1] : normal S1 [Premature Beats] : regular with premature beats [Normal S2] : abnormal S2 [I] : a grade 1 [Distant] : the heart sounds were distant [No Abdominal Bruit] : a ~M bruit was not heard ~T in the abdomen [No Carotid Bruits] : no carotid bruits [No Varicosities] : no varicosities [Pedal Pulses Present] : the pedal pulses are present [No Edema] : there was no peripheral edema [No Extremity Clubbing/Cyanosis] : no extremity clubbing/cyanosis [Soft] : abdomen soft [No Palpable Aorta] : no palpable aorta [Non Tender] : non-tender [No Masses] : no abdominal mass palpated [Non-distended] : non-distended [No HSM] : no HSM [Normal Bowel Sounds] : normal bowel sounds [Normal Posterior Cervical Nodes] : no posterior cervical lymphadenopathy [Normal Anterior Cervical Nodes] : no anterior cervical lymphadenopathy [No CVA Tenderness] : no CVA  tenderness [No Joint Swelling] : no joint swelling [No Spinal Tenderness] : no spinal tenderness [Grossly Normal Strength/Tone] : grossly normal strength/tone [Normal Gait] : normal gait [Coordination Grossly Intact] : coordination grossly intact [No Focal Deficits] : no focal deficits [Deep Tendon Reflexes (DTR)] : deep tendon reflexes were 2+ and symmetric [Speech Grossly Normal] : speech grossly normal [Memory Grossly Normal] : memory grossly normal [Alert and Oriented x3] : oriented to person, place, and time [Normal Affect] : the affect was normal [Normal Insight/Judgement] : insight and judgment were intact [de-identified] : psoriasis better

## 2019-07-08 NOTE — REVIEW OF SYSTEMS
[Cough] : no cough [Shortness Of Breath] : no shortness of breath [Wheezing] : no wheezing [Nausea] : no nausea [Dyspnea on Exertion] : not dyspnea on exertion [Abdominal Pain] : no abdominal pain [Constipation] : no constipation [Diarrhea] : no diarrhea [Vomiting] : no vomiting [Dysuria] : no dysuria [Heartburn] : no heartburn [Melena] : no melena [Hesitancy] : no hesitancy [Incontinence] : no incontinence [Nocturia] : nocturia [Frequency] : no frequency [Hematuria] : no hematuria [Impotence] : no impotency [Joint Stiffness] : no joint stiffness [Back Pain] : back pain [Poor Libido] : libido not poor [Mole Changes] : no mole changes [Itching] : no itching [Nail Changes] : no nail changes [Skin Rash] : no skin rash [Hair Changes] : no hair changes [Fainting] : no fainting [Headache] : headache [Dizziness] : no dizziness [Confusion] : no confusion [Memory Loss] : no memory loss [Suicidal] : not suicidal [Insomnia] : no insomnia [Anxiety] : no anxiety [Negative] : Heme/Lymph [FreeTextEntry7] : loss of appetite [de-identified] : improving

## 2019-07-18 ENCOUNTER — RX RENEWAL (OUTPATIENT)
Age: 79
End: 2019-07-18

## 2019-07-29 ENCOUNTER — RX CHANGE (OUTPATIENT)
Age: 79
End: 2019-07-29

## 2019-07-29 RX ORDER — ATORVASTATIN CALCIUM 40 MG/1
40 TABLET, FILM COATED ORAL
Qty: 90 | Refills: 3 | Status: COMPLETED | COMMUNITY
Start: 2019-07-29 | End: 2019-07-29

## 2019-07-30 ENCOUNTER — APPOINTMENT (OUTPATIENT)
Dept: INTERNAL MEDICINE | Facility: CLINIC | Age: 79
End: 2019-07-30
Payer: MEDICARE
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
## 2019-07-30 DIAGNOSIS — G47.00 INSOMNIA, UNSPECIFIED: ICD-10-CM

## 2019-07-30 LAB — INR PPP: 2.1 RATIO

## 2019-07-30 PROCEDURE — 85610 PROTHROMBIN TIME: CPT | Mod: QW

## 2019-07-30 PROCEDURE — 99214 OFFICE O/P EST MOD 30 MIN: CPT | Mod: 25

## 2019-07-30 NOTE — ASSESSMENT
[FreeTextEntry1] : doing  well\par INR perfect\par no further falls\par headaches fully gone\par  no congestive heart failure on exam\par blood pressure is very well controlled on lower amlodipine\par  COPD well controlled\par insomnia worse\par

## 2019-07-30 NOTE — HISTORY OF PRESENT ILLNESS
[de-identified] :  stable shortness of breath on exertion \par  long history of COPD and asthma, very well controlled on his current regimen no need to see lung specialist [place] \par last MUGA EF 47%\par history of coronary artery disease and past myocardial infarction and most recently in April of last year had a stent to a large 90% circumflex lesion with an ejection fraction at that time estimated at 30%\par echocardiogram was done estimating an ejection fraction of 35-40%, there was a significant lack of synchronicity of the ventricular contraction\par renal insufficiency has been stable\par no further falls since last visit\par INR 2.1 today\par stable mild depressive symptoms on duloxetine, declines to see psychologist or psychiatrist\par oinsomnia worse [FreeTextEntry1] : to follow up multiple ongoing conditions

## 2019-08-08 ENCOUNTER — OTHER (OUTPATIENT)
Age: 79
End: 2019-08-08

## 2019-08-08 DIAGNOSIS — S09.90XA UNSPECIFIED INJURY OF HEAD, INITIAL ENCOUNTER: ICD-10-CM

## 2019-08-08 DIAGNOSIS — R51 HEADACHE: ICD-10-CM

## 2019-08-08 RX ORDER — BUTALBITAL, ACETAMINOPHEN AND CAFFEINE 325; 50; 40 MG/1; MG/1; MG/1
50-325-40 TABLET ORAL
Qty: 60 | Refills: 0 | Status: ACTIVE | COMMUNITY
Start: 2019-08-08 | End: 1900-01-01

## 2019-08-11 ENCOUNTER — OTHER (OUTPATIENT)
Age: 79
End: 2019-08-11

## 2019-08-29 ENCOUNTER — APPOINTMENT (OUTPATIENT)
Dept: INTERNAL MEDICINE | Facility: CLINIC | Age: 79
End: 2019-08-29
Payer: MEDICARE

## 2019-08-29 VITALS
BODY MASS INDEX: 24.93 KG/M2 | SYSTOLIC BLOOD PRESSURE: 138 MMHG | HEIGHT: 66.5 IN | DIASTOLIC BLOOD PRESSURE: 70 MMHG | WEIGHT: 157 LBS

## 2019-08-29 DIAGNOSIS — R53.83 OTHER FATIGUE: ICD-10-CM

## 2019-08-29 DIAGNOSIS — R29.3 ABNORMAL POSTURE: ICD-10-CM

## 2019-08-29 DIAGNOSIS — R26.9 UNSPECIFIED ABNORMALITIES OF GAIT AND MOBILITY: ICD-10-CM

## 2019-08-29 PROCEDURE — 99214 OFFICE O/P EST MOD 30 MIN: CPT | Mod: 25

## 2019-08-29 PROCEDURE — 36415 COLL VENOUS BLD VENIPUNCTURE: CPT

## 2019-08-29 NOTE — PHYSICAL EXAM
[No Acute Distress] : no acute distress [Well Nourished] : well nourished [Well Developed] : well developed [Well-Appearing] : well-appearing [PERRL] : pupils equal round and reactive to light [Normal Sclera/Conjunctiva] : normal sclera/conjunctiva [Normal Oropharynx] : the oropharynx was normal [Normal Outer Ear/Nose] : the outer ears and nose were normal in appearance [No JVD] : no jugular venous distention [No Lymphadenopathy] : no lymphadenopathy [Supple] : supple [No Respiratory Distress] : no respiratory distress  [Thyroid Normal, No Nodules] : the thyroid was normal and there were no nodules present [Clear to Auscultation] : lungs were clear to auscultation bilaterally [Normal Rate] : normal rate  [No Accessory Muscle Use] : no accessory muscle use [Normal S1] : normal S1 [Premature Beats] : regular with premature beats [Distant] : the heart sounds were distant [I] : a grade 1 [No Carotid Bruits] : no carotid bruits [No Varicosities] : no varicosities [No Abdominal Bruit] : a ~M bruit was not heard ~T in the abdomen [No Edema] : there was no peripheral edema [Pedal Pulses Present] : the pedal pulses are present [No Extremity Clubbing/Cyanosis] : no extremity clubbing/cyanosis [No Palpable Aorta] : no palpable aorta [Soft] : abdomen soft [Non Tender] : non-tender [Non-distended] : non-distended [No Masses] : no abdominal mass palpated [No HSM] : no HSM [Normal Bowel Sounds] : normal bowel sounds [Normal Posterior Cervical Nodes] : no posterior cervical lymphadenopathy [Normal Anterior Cervical Nodes] : no anterior cervical lymphadenopathy [No CVA Tenderness] : no CVA  tenderness [No Spinal Tenderness] : no spinal tenderness [No Joint Swelling] : no joint swelling [Grossly Normal Strength/Tone] : grossly normal strength/tone [Normal Gait] : normal gait [Coordination Grossly Intact] : coordination grossly intact [No Focal Deficits] : no focal deficits [Deep Tendon Reflexes (DTR)] : deep tendon reflexes were 2+ and symmetric [Speech Grossly Normal] : speech grossly normal [Memory Grossly Normal] : memory grossly normal [Normal Affect] : the affect was normal [Alert and Oriented x3] : oriented to person, place, and time [Normal Insight/Judgement] : insight and judgment were intact [Normal S2] : abnormal S2 [de-identified] : psoriasis better

## 2019-08-29 NOTE — REVIEW OF SYSTEMS
[Chills] : no chills [Fever] : no fever [Night Sweats] : no night sweats [Hot Flashes] : no hot flashes [Recent Change In Weight] : ~T no recent weight change [Chest Pain] : no chest pain [Palpitations] : no palpitations [Claudication] : no  leg claudication [Orthopena] : no orthopnea [Lower Ext Edema] : no lower extremity edema [Paroysmal Nocturnal Dyspnea] : no paroysmal nocturnal dyspnea [Shortness Of Breath] : no shortness of breath [Wheezing] : no wheezing [Dyspnea on Exertion] : not dyspnea on exertion [Cough] : no cough [Nausea] : no nausea [Abdominal Pain] : no abdominal pain [Constipation] : no constipation [Diarrhea] : no diarrhea [Vomiting] : no vomiting [Heartburn] : no heartburn [Melena] : no melena [Dysuria] : no dysuria [Incontinence] : no incontinence [Nocturia] : nocturia [Hesitancy] : no hesitancy [Hematuria] : no hematuria [Frequency] : no frequency [Poor Libido] : libido not poor [Impotence] : no impotency [Back Pain] : back pain [Joint Stiffness] : no joint stiffness [Itching] : no itching [Mole Changes] : no mole changes [Nail Changes] : no nail changes [Hair Changes] : no hair changes [Skin Rash] : no skin rash [Dizziness] : no dizziness [Headache] : no headache [Fainting] : no fainting [Confusion] : no confusion [Memory Loss] : no memory loss [Insomnia] : no insomnia [Suicidal] : not suicidal [Anxiety] : no anxiety [Depression] : depression [Negative] : Heme/Lymph

## 2019-08-29 NOTE — HISTORY OF PRESENT ILLNESS
[FreeTextEntry1] : here to follow ongoing conditions [de-identified] :  stable shortness of breath on exertion \par  long history of COPD and asthma, very well controlled on his current regimen no need to see lung specialist [place] \par last MUGA EF 47%\par history of coronary artery disease and past myocardial infarction and most recently in April of last year had a stent to a large 90% circumflex lesion with an ejection fraction at that time estimated at 30%\par echocardiogram was done estimating an ejection fraction of 35-40%, there was a significant lack of synchronicity of the ventricular contraction\par renal insufficiency has been stable\par no further falls since last visit\par INR 1.9 today\par has depressive symptoms on duloxetine, declines to see psychologist or psychiatrist, not in any way suicidal\par

## 2019-08-29 NOTE — ASSESSMENT
[FreeTextEntry1] : INR is a bit low at 1.9 with no change in diet\par Suspect patient would improve significantly from physical therapy referral to his poor posture and decreased activities\par Suggest he see a psychiatrist but he declines this intervention and is not in any way suicidal\par Suspect a most of his symptoms are from depression and deconditioning and patient also agrees with this but declines to see psychiatrist and does agree to have physical therapy\par We'll check bloods

## 2019-08-30 ENCOUNTER — RESULT CHARGE (OUTPATIENT)
Age: 79
End: 2019-08-30

## 2019-08-30 LAB
ALBUMIN SERPL ELPH-MCNC: 4.2 G/DL
ALP BLD-CCNC: 87 U/L
ALT SERPL-CCNC: 13 U/L
ANION GAP SERPL CALC-SCNC: 10 MMOL/L
AST SERPL-CCNC: 15 U/L
BASOPHILS # BLD AUTO: 0.02 K/UL
BASOPHILS NFR BLD AUTO: 0.3 %
BILIRUB SERPL-MCNC: 0.3 MG/DL
BUN SERPL-MCNC: 33 MG/DL
CALCIUM SERPL-MCNC: 9.5 MG/DL
CHLORIDE SERPL-SCNC: 105 MMOL/L
CK SERPL-CCNC: 33 U/L
CO2 SERPL-SCNC: 28 MMOL/L
CREAT SERPL-MCNC: 1.76 MG/DL
EOSINOPHIL # BLD AUTO: 0.43 K/UL
EOSINOPHIL NFR BLD AUTO: 6.6 %
ESTIMATED AVERAGE GLUCOSE: 108 MG/DL
GLUCOSE SERPL-MCNC: 91 MG/DL
HBA1C MFR BLD HPLC: 5.4 %
HCT VFR BLD CALC: 36.1 %
HGB BLD-MCNC: 11.1 G/DL
IMM GRANULOCYTES NFR BLD AUTO: 0.2 %
INR PPP: 1.8 RATIO
LYMPHOCYTES # BLD AUTO: 1.04 K/UL
LYMPHOCYTES NFR BLD AUTO: 16 %
MAN DIFF?: NORMAL
MCHC RBC-ENTMCNC: 28.7 PG
MCHC RBC-ENTMCNC: 30.7 GM/DL
MCV RBC AUTO: 93.3 FL
MONOCYTES # BLD AUTO: 0.46 K/UL
MONOCYTES NFR BLD AUTO: 7.1 %
NEUTROPHILS # BLD AUTO: 4.53 K/UL
NEUTROPHILS NFR BLD AUTO: 69.8 %
PLATELET # BLD AUTO: 229 K/UL
POTASSIUM SERPL-SCNC: 4.5 MMOL/L
PROT SERPL-MCNC: 6.6 G/DL
RBC # BLD: 3.87 M/UL
RBC # FLD: 13.9 %
SODIUM SERPL-SCNC: 143 MMOL/L
T4 FREE SERPL-MCNC: 1.3 NG/DL
TSH SERPL-ACNC: 2.93 UIU/ML
WBC # FLD AUTO: 6.49 K/UL

## 2019-09-17 ENCOUNTER — APPOINTMENT (OUTPATIENT)
Dept: INTERNAL MEDICINE | Facility: CLINIC | Age: 79
End: 2019-09-17
Payer: MEDICARE

## 2019-09-17 VITALS — SYSTOLIC BLOOD PRESSURE: 128 MMHG | DIASTOLIC BLOOD PRESSURE: 70 MMHG | HEIGHT: 66.5 IN | BODY MASS INDEX: 25.12 KG/M2

## 2019-09-17 VITALS — WEIGHT: 158 LBS | BODY MASS INDEX: 25.12 KG/M2

## 2019-09-17 LAB — INR PPP: 2.7 RATIO

## 2019-09-17 PROCEDURE — 85610 PROTHROMBIN TIME: CPT | Mod: QW

## 2019-09-17 PROCEDURE — 99214 OFFICE O/P EST MOD 30 MIN: CPT | Mod: 25

## 2019-09-17 NOTE — HISTORY OF PRESENT ILLNESS
[FreeTextEntry1] : here to follow ongoing conditions [de-identified] :  stable shortness of breath on exertion \par  long history of COPD and asthma, well controlled on his current regimen \par last MUGA EF 47%\par history of coronary artery disease and past myocardial infarction and most recently in April of last year had a stent to a large 90% circumflex lesion with an ejection fraction at that time estimated at 30%\par echocardiogram was done estimating an ejection fraction of 35-40%, there was a significant lack of synchronicity of the ventricular contraction\par renal insufficiency has been stable\par no further falls since last visit\par INR 2.7 today\par has depressive symptoms on duloxetine, declines to see psychologist or psychiatrist, not in any way suicidal\par back in PT, mild ankle soreness\par

## 2019-09-17 NOTE — ASSESSMENT
[FreeTextEntry1] : INR is therapeutic on current dose of warfarin\par Has resumed physical therapy\par No signs of congestive heart failure\par Lungs sound great\par No obvious concerning ankle injury on my exam

## 2019-09-17 NOTE — PHYSICAL EXAM
[No Acute Distress] : no acute distress [Well Nourished] : well nourished [Well Developed] : well developed [Well-Appearing] : well-appearing [Normal Sclera/Conjunctiva] : normal sclera/conjunctiva [PERRL] : pupils equal round and reactive to light [Normal Outer Ear/Nose] : the outer ears and nose were normal in appearance [Normal Oropharynx] : the oropharynx was normal [No JVD] : no jugular venous distention [Supple] : supple [No Lymphadenopathy] : no lymphadenopathy [Thyroid Normal, No Nodules] : the thyroid was normal and there were no nodules present [No Respiratory Distress] : no respiratory distress  [Clear to Auscultation] : lungs were clear to auscultation bilaterally [No Accessory Muscle Use] : no accessory muscle use [Normal Rate] : normal rate  [Premature Beats] : regular with premature beats [Normal S1] : normal S1 [Distant] : the heart sounds were distant [I] : a grade 1 [No Carotid Bruits] : no carotid bruits [No Abdominal Bruit] : a ~M bruit was not heard ~T in the abdomen [No Varicosities] : no varicosities [Pedal Pulses Present] : the pedal pulses are present [No Edema] : there was no peripheral edema [No Palpable Aorta] : no palpable aorta [No Extremity Clubbing/Cyanosis] : no extremity clubbing/cyanosis [Soft] : abdomen soft [Non Tender] : non-tender [No Masses] : no abdominal mass palpated [Non-distended] : non-distended [No HSM] : no HSM [Normal Bowel Sounds] : normal bowel sounds [Normal Posterior Cervical Nodes] : no posterior cervical lymphadenopathy [Normal Anterior Cervical Nodes] : no anterior cervical lymphadenopathy [No CVA Tenderness] : no CVA  tenderness [No Spinal Tenderness] : no spinal tenderness [No Joint Swelling] : no joint swelling [Grossly Normal Strength/Tone] : grossly normal strength/tone [Normal Gait] : normal gait [Coordination Grossly Intact] : coordination grossly intact [No Focal Deficits] : no focal deficits [Deep Tendon Reflexes (DTR)] : deep tendon reflexes were 2+ and symmetric [Speech Grossly Normal] : speech grossly normal [Memory Grossly Normal] : memory grossly normal [Normal Affect] : the affect was normal [Alert and Oriented x3] : oriented to person, place, and time [Normal Insight/Judgement] : insight and judgment were intact [Normal S2] : abnormal S2 [de-identified] : ankle examined, no concerning findings [de-identified] : psoriasis better

## 2019-09-27 ENCOUNTER — RX RENEWAL (OUTPATIENT)
Age: 79
End: 2019-09-27

## 2019-10-22 ENCOUNTER — APPOINTMENT (OUTPATIENT)
Dept: INTERNAL MEDICINE | Facility: CLINIC | Age: 79
End: 2019-10-22
Payer: MEDICARE

## 2019-10-22 ENCOUNTER — RESULT CHARGE (OUTPATIENT)
Age: 79
End: 2019-10-22

## 2019-10-22 VITALS — BODY MASS INDEX: 25.44 KG/M2 | DIASTOLIC BLOOD PRESSURE: 74 MMHG | HEIGHT: 66.5 IN | SYSTOLIC BLOOD PRESSURE: 120 MMHG

## 2019-10-22 VITALS — WEIGHT: 160 LBS | BODY MASS INDEX: 25.44 KG/M2

## 2019-10-22 LAB — INR PPP: 2.9 RATIO

## 2019-10-22 PROCEDURE — 99214 OFFICE O/P EST MOD 30 MIN: CPT | Mod: 25

## 2019-10-22 PROCEDURE — 85610 PROTHROMBIN TIME: CPT | Mod: QW

## 2019-10-22 NOTE — REVIEW OF SYSTEMS
[Nocturia] : nocturia [Back Pain] : back pain [Depression] : depression [Negative] : Heme/Lymph [Itching] : itching [Fever] : no fever [Chills] : no chills [Hot Flashes] : no hot flashes [Night Sweats] : no night sweats [Recent Change In Weight] : ~T no recent weight change [Chest Pain] : no chest pain [Palpitations] : no palpitations [Claudication] : no  leg claudication [Lower Ext Edema] : no lower extremity edema [Orthopena] : no orthopnea [Shortness Of Breath] : no shortness of breath [Wheezing] : no wheezing [Cough] : no cough [Dyspnea on Exertion] : not dyspnea on exertion [Abdominal Pain] : no abdominal pain [Nausea] : no nausea [Constipation] : no constipation [Diarrhea] : no diarrhea [Vomiting] : no vomiting [Heartburn] : no heartburn [Melena] : no melena [Dysuria] : no dysuria [Incontinence] : no incontinence [Hesitancy] : no hesitancy [Hematuria] : no hematuria [Frequency] : no frequency [Impotence] : no impotency [Poor Libido] : libido not poor [Joint Stiffness] : no joint stiffness [Mole Changes] : no mole changes [Nail Changes] : no nail changes [Hair Changes] : no hair changes [Skin Rash] : no skin rash [Headache] : no headache [Dizziness] : no dizziness [Fainting] : no fainting [Confusion] : no confusion [Memory Loss] : no memory loss [Suicidal] : not suicidal [Insomnia] : no insomnia [Anxiety] : no anxiety

## 2019-10-22 NOTE — HISTORY OF PRESENT ILLNESS
[FreeTextEntry1] : here to follow ongoing conditions [de-identified] :  stable shortness of breath on exertion \par  long history of COPD and asthma, well controlled on his current regimen \par last MUGA EF 47%\par history of coronary artery disease and past myocardial infarction and most recently in April of last year had a stent to a large 90% circumflex lesion with an ejection fraction at that time estimated at 30%\par echocardiogram was done estimating an ejection fraction of 35-40%, there was a significant lack of synchronicity of the ventricular contraction\par renal insufficiency has been stable\par no further falls since last visit\par INR  2.9 today\par has depressive symptoms on duloxetine, declines to see psychologist or psychiatrist, not in any way suicidal\par back in PT doing better, helping\par was on antibiotics for dog bite finished 2 weeks ago\par seeing [facility] for PPI today\par \par

## 2019-10-22 NOTE — ASSESSMENT
[FreeTextEntry1] : INR is therapeutic on current dose of warfarin\par Has resumed physical therapy and enjoys it and finds it helps!\par No signs of congestive heart failure on exam\par lungs sound good moving air well

## 2019-10-22 NOTE — PHYSICAL EXAM
[No Acute Distress] : no acute distress [Well Nourished] : well nourished [Well Developed] : well developed [Normal Sclera/Conjunctiva] : normal sclera/conjunctiva [Well-Appearing] : well-appearing [Normal Outer Ear/Nose] : the outer ears and nose were normal in appearance [Normal Oropharynx] : the oropharynx was normal [No JVD] : no jugular venous distention [Supple] : supple [No Lymphadenopathy] : no lymphadenopathy [No Respiratory Distress] : no respiratory distress  [Thyroid Normal, No Nodules] : the thyroid was normal and there were no nodules present [Clear to Auscultation] : lungs were clear to auscultation bilaterally [Normal Rate] : normal rate  [No Accessory Muscle Use] : no accessory muscle use [Premature Beats] : regular with premature beats [Normal S1] : normal S1 [I] : a grade 1 [Distant] : the heart sounds were distant [No Carotid Bruits] : no carotid bruits [No Varicosities] : no varicosities [No Abdominal Bruit] : a ~M bruit was not heard ~T in the abdomen [Pedal Pulses Present] : the pedal pulses are present [No Edema] : there was no peripheral edema [No Extremity Clubbing/Cyanosis] : no extremity clubbing/cyanosis [No Palpable Aorta] : no palpable aorta [Soft] : abdomen soft [Non-distended] : non-distended [Non Tender] : non-tender [No HSM] : no HSM [No Masses] : no abdominal mass palpated [Normal Posterior Cervical Nodes] : no posterior cervical lymphadenopathy [Normal Bowel Sounds] : normal bowel sounds [Normal Anterior Cervical Nodes] : no anterior cervical lymphadenopathy [No CVA Tenderness] : no CVA  tenderness [Grossly Normal Strength/Tone] : grossly normal strength/tone [No Joint Swelling] : no joint swelling [No Spinal Tenderness] : no spinal tenderness [Coordination Grossly Intact] : coordination grossly intact [Normal Gait] : normal gait [Deep Tendon Reflexes (DTR)] : deep tendon reflexes were 2+ and symmetric [No Focal Deficits] : no focal deficits [Memory Grossly Normal] : memory grossly normal [Speech Grossly Normal] : speech grossly normal [Normal Affect] : the affect was normal [Alert and Oriented x3] : oriented to person, place, and time [Normal Insight/Judgement] : insight and judgment were intact [Normal S2] : abnormal S2 [de-identified] : psoriasis

## 2019-11-19 ENCOUNTER — APPOINTMENT (OUTPATIENT)
Dept: INTERNAL MEDICINE | Facility: CLINIC | Age: 79
End: 2019-11-19

## 2019-11-27 ENCOUNTER — RX RENEWAL (OUTPATIENT)
Age: 79
End: 2019-11-27

## 2019-12-02 ENCOUNTER — APPOINTMENT (OUTPATIENT)
Dept: INTERNAL MEDICINE | Facility: CLINIC | Age: 79
End: 2019-12-02
Payer: MEDICARE

## 2019-12-02 VITALS
BODY MASS INDEX: 25.25 KG/M2 | WEIGHT: 159 LBS | HEIGHT: 66.5 IN | SYSTOLIC BLOOD PRESSURE: 122 MMHG | DIASTOLIC BLOOD PRESSURE: 74 MMHG

## 2019-12-02 LAB — INR PPP: 3.1 RATIO

## 2019-12-02 PROCEDURE — 99214 OFFICE O/P EST MOD 30 MIN: CPT | Mod: 25

## 2019-12-02 PROCEDURE — 85610 PROTHROMBIN TIME: CPT | Mod: QW

## 2019-12-02 RX ORDER — METFORMIN ER 500 MG 500 MG/1
500 TABLET ORAL
Qty: 90 | Refills: 3 | Status: COMPLETED | COMMUNITY
End: 2019-12-02

## 2019-12-02 NOTE — PHYSICAL EXAM
[No Acute Distress] : no acute distress [Well Nourished] : well nourished [Well Developed] : well developed [Well-Appearing] : well-appearing [Normal Sclera/Conjunctiva] : normal sclera/conjunctiva [Normal Outer Ear/Nose] : the outer ears and nose were normal in appearance [Normal Oropharynx] : the oropharynx was normal [No JVD] : no jugular venous distention [Supple] : supple [No Lymphadenopathy] : no lymphadenopathy [Thyroid Normal, No Nodules] : the thyroid was normal and there were no nodules present [No Respiratory Distress] : no respiratory distress  [Clear to Auscultation] : lungs were clear to auscultation bilaterally [No Accessory Muscle Use] : no accessory muscle use [Normal Rate] : normal rate  [Premature Beats] : regular with premature beats [Normal S1] : normal S1 [Distant] : the heart sounds were distant [I] : a grade 1 [No Carotid Bruits] : no carotid bruits [No Abdominal Bruit] : a ~M bruit was not heard ~T in the abdomen [No Varicosities] : no varicosities [Pedal Pulses Present] : the pedal pulses are present [No Extremity Clubbing/Cyanosis] : no extremity clubbing/cyanosis [No Palpable Aorta] : no palpable aorta [___ +] : bilateral [unfilled]U+ pretibial pitting edema [Soft] : abdomen soft [Non Tender] : non-tender [Non-distended] : non-distended [No Masses] : no abdominal mass palpated [No HSM] : no HSM [Normal Bowel Sounds] : normal bowel sounds [Normal Posterior Cervical Nodes] : no posterior cervical lymphadenopathy [Normal Anterior Cervical Nodes] : no anterior cervical lymphadenopathy [No CVA Tenderness] : no CVA  tenderness [No Spinal Tenderness] : no spinal tenderness [No Joint Swelling] : no joint swelling [Grossly Normal Strength/Tone] : grossly normal strength/tone [Normal Gait] : normal gait [Coordination Grossly Intact] : coordination grossly intact [No Focal Deficits] : no focal deficits [Deep Tendon Reflexes (DTR)] : deep tendon reflexes were 2+ and symmetric [Speech Grossly Normal] : speech grossly normal [Memory Grossly Normal] : memory grossly normal [Normal Affect] : the affect was normal [Alert and Oriented x3] : oriented to person, place, and time [Normal Insight/Judgement] : insight and judgment were intact [Normal S2] : abnormal S2 [de-identified] : psoriasis

## 2019-12-02 NOTE — REVIEW OF SYSTEMS
[Nocturia] : nocturia [Back Pain] : back pain [Itching] : itching [Depression] : depression [Negative] : Heme/Lymph [Fever] : no fever [Chills] : no chills [Hot Flashes] : no hot flashes [Night Sweats] : no night sweats [Recent Change In Weight] : ~T no recent weight change [Chest Pain] : no chest pain [Palpitations] : no palpitations [Claudication] : no  leg claudication [Lower Ext Edema] : no lower extremity edema [Orthopena] : no orthopnea [Shortness Of Breath] : no shortness of breath [Wheezing] : no wheezing [Cough] : no cough [Dyspnea on Exertion] : not dyspnea on exertion [Nausea] : no nausea [Abdominal Pain] : no abdominal pain [Constipation] : no constipation [Diarrhea] : no diarrhea [Heartburn] : no heartburn [Vomiting] : no vomiting [Dysuria] : no dysuria [Melena] : no melena [Incontinence] : no incontinence [Hesitancy] : no hesitancy [Frequency] : no frequency [Hematuria] : no hematuria [Impotence] : no impotency [Poor Libido] : libido not poor [Joint Stiffness] : no joint stiffness [Mole Changes] : no mole changes [Nail Changes] : no nail changes [Hair Changes] : no hair changes [Skin Rash] : no skin rash [Headache] : no headache [Dizziness] : no dizziness [Fainting] : no fainting [Confusion] : no confusion [Memory Loss] : no memory loss [Suicidal] : not suicidal [Insomnia] : no insomnia [Anxiety] : no anxiety

## 2019-12-02 NOTE — HISTORY OF PRESENT ILLNESS
[de-identified] :  stable but significant shortness of breath on exertion \par  long history of COPD and asthma\par last MUGA EF 47%\par history of coronary artery disease and past myocardial infarction and most in April of last year had a stent to a large 90% circumflex lesion with an ejection fraction at that time estimated at 30%\par echocardiogram was done estimating an ejection fraction of 35-40%, there was a significant lack of synchronicity of the ventricular contraction, last done 11/2018\par renal insufficiency has been stable\par INR 3.19 today\par depressive symptoms on duloxetine, declines to see psychologist or psychiatrist, not in any way suicidal\par back in PT doing better, feels it helps\par on metformin 500, last A1C normal\par was on antibiotics for dog bite finished 2 weeks ago\par seeing [facility] for PPI today\par \par  [FreeTextEntry1] : here to follow ongoing conditions

## 2019-12-02 NOTE — PLAN
[FreeTextEntry1] : reduce warfarin to 1/2 MWF whole other 4 days\par stop metformin\par echocardiogram to asses LV function\par fasting bloods after a month

## 2019-12-02 NOTE — ASSESSMENT
[FreeTextEntry1] : INR is just high on current dose of warfarin\par has resumed physical therapy and enjoys it and finds it helps but still dyspnea on exertion\par moving air well\par no signs of congestive heart failure on exam\par s/p stent hx MI and reduced EF\par on metformin likely not necessary

## 2019-12-23 ENCOUNTER — APPOINTMENT (OUTPATIENT)
Dept: INTERNAL MEDICINE | Facility: CLINIC | Age: 79
End: 2019-12-23

## 2019-12-23 ENCOUNTER — APPOINTMENT (OUTPATIENT)
Dept: INTERNAL MEDICINE | Facility: CLINIC | Age: 79
End: 2019-12-23
Payer: MEDICARE

## 2019-12-23 VITALS
HEIGHT: 66.5 IN | WEIGHT: 160 LBS | SYSTOLIC BLOOD PRESSURE: 124 MMHG | BODY MASS INDEX: 25.41 KG/M2 | DIASTOLIC BLOOD PRESSURE: 72 MMHG

## 2019-12-23 DIAGNOSIS — E78.5 HYPERLIPIDEMIA, UNSPECIFIED: ICD-10-CM

## 2019-12-23 LAB — INR PPP: 3.2 RATIO

## 2019-12-23 PROCEDURE — 90662 IIV NO PRSV INCREASED AG IM: CPT

## 2019-12-23 PROCEDURE — 93306 TTE W/DOPPLER COMPLETE: CPT

## 2019-12-23 PROCEDURE — 99214 OFFICE O/P EST MOD 30 MIN: CPT | Mod: 25

## 2019-12-23 PROCEDURE — 85610 PROTHROMBIN TIME: CPT | Mod: QW

## 2019-12-23 PROCEDURE — G0008: CPT

## 2019-12-23 PROCEDURE — 36415 COLL VENOUS BLD VENIPUNCTURE: CPT

## 2019-12-23 NOTE — PHYSICAL EXAM
[No Acute Distress] : no acute distress [Well Nourished] : well nourished [Well Developed] : well developed [Well-Appearing] : well-appearing [Normal Sclera/Conjunctiva] : normal sclera/conjunctiva [Normal Oropharynx] : the oropharynx was normal [Normal Outer Ear/Nose] : the outer ears and nose were normal in appearance [No JVD] : no jugular venous distention [No Lymphadenopathy] : no lymphadenopathy [Supple] : supple [Thyroid Normal, No Nodules] : the thyroid was normal and there were no nodules present [Clear to Auscultation] : lungs were clear to auscultation bilaterally [No Respiratory Distress] : no respiratory distress  [No Accessory Muscle Use] : no accessory muscle use [Normal S1] : normal S1 [Normal Rate] : normal rate  [Premature Beats] : regular with premature beats [Distant] : the heart sounds were distant [I] : a grade 1 [No Carotid Bruits] : no carotid bruits [No Abdominal Bruit] : a ~M bruit was not heard ~T in the abdomen [No Extremity Clubbing/Cyanosis] : no extremity clubbing/cyanosis [Pedal Pulses Present] : the pedal pulses are present [No Varicosities] : no varicosities [No Palpable Aorta] : no palpable aorta [___ +] : bilateral [unfilled]U+ pretibial pitting edema [Soft] : abdomen soft [No Masses] : no abdominal mass palpated [Non-distended] : non-distended [Non Tender] : non-tender [Normal Posterior Cervical Nodes] : no posterior cervical lymphadenopathy [Normal Bowel Sounds] : normal bowel sounds [No HSM] : no HSM [Normal Anterior Cervical Nodes] : no anterior cervical lymphadenopathy [No CVA Tenderness] : no CVA  tenderness [No Spinal Tenderness] : no spinal tenderness [No Joint Swelling] : no joint swelling [Grossly Normal Strength/Tone] : grossly normal strength/tone [Coordination Grossly Intact] : coordination grossly intact [Deep Tendon Reflexes (DTR)] : deep tendon reflexes were 2+ and symmetric [Normal Gait] : normal gait [No Focal Deficits] : no focal deficits [Memory Grossly Normal] : memory grossly normal [Normal Affect] : the affect was normal [Speech Grossly Normal] : speech grossly normal [Normal Insight/Judgement] : insight and judgment were intact [Alert and Oriented x3] : oriented to person, place, and time [Normal S2] : abnormal S2 [de-identified] : psoriasis

## 2019-12-23 NOTE — REVIEW OF SYSTEMS
[Nocturia] : nocturia [Itching] : itching [Back Pain] : back pain [Depression] : depression [Negative] : Heme/Lymph [Chills] : no chills [Fever] : no fever [Night Sweats] : no night sweats [Recent Change In Weight] : ~T no recent weight change [Hot Flashes] : no hot flashes [Palpitations] : no palpitations [Chest Pain] : no chest pain [Lower Ext Edema] : no lower extremity edema [Claudication] : no  leg claudication [Orthopena] : no orthopnea [Wheezing] : no wheezing [Shortness Of Breath] : no shortness of breath [Abdominal Pain] : no abdominal pain [Dyspnea on Exertion] : not dyspnea on exertion [Cough] : no cough [Constipation] : no constipation [Diarrhea] : no diarrhea [Nausea] : no nausea [Melena] : no melena [Vomiting] : no vomiting [Heartburn] : no heartburn [Hematuria] : no hematuria [Incontinence] : no incontinence [Dysuria] : no dysuria [Hesitancy] : no hesitancy [Frequency] : no frequency [Impotence] : no impotency [Poor Libido] : libido not poor [Joint Stiffness] : no joint stiffness [Mole Changes] : no mole changes [Nail Changes] : no nail changes [Hair Changes] : no hair changes [Skin Rash] : no skin rash [Fainting] : no fainting [Dizziness] : no dizziness [Headache] : no headache [Confusion] : no confusion [Suicidal] : not suicidal [Memory Loss] : no memory loss [Anxiety] : no anxiety [Insomnia] : no insomnia

## 2019-12-23 NOTE — ASSESSMENT
[FreeTextEntry1] : INR 3.2 on current dose of warfarin\par has resumed physical therapy and enjoys it and finds it helps but still dyspnea on exertion\par moving air well\par no signs of congestive heart failure on exam\par s/p stent hx MI and reduced EF\par off metformin

## 2019-12-23 NOTE — HISTORY OF PRESENT ILLNESS
[FreeTextEntry1] : here to follow ongoing conditions [de-identified] :  stable shortness of breath on exertion \par  long history of COPD and asthma\par last MUGA EF 47% just had echo a few minutes ago\par history of coronary artery disease and past myocardial infarction and most in April of last year had a stent to a large 90% circumflex lesion with an ejection fraction at that time estimated at 30%\par echocardiogram was done estimating an ejection fraction of 35-40%, there was a significant lack of synchronicity of the ventricular contraction\par renal insufficiency has been stable\par INR 3.19 today\par depressive symptoms on duloxetine, declines to see psychologist or psychiatrist, not in any way suicidal\par back in PT doing better, feels it helps\par on metformin 500, last A1C normal\par was on antibiotics for dog bite finished 2 weeks ago\par seeing [facility] for PPI today\par \par

## 2019-12-24 LAB
ALBUMIN SERPL ELPH-MCNC: 4 G/DL
ALP BLD-CCNC: 94 U/L
ALT SERPL-CCNC: 15 U/L
ANION GAP SERPL CALC-SCNC: 12 MMOL/L
AST SERPL-CCNC: 16 U/L
BASOPHILS # BLD AUTO: 0.02 K/UL
BASOPHILS NFR BLD AUTO: 0.3 %
BILIRUB SERPL-MCNC: 0.3 MG/DL
BUN SERPL-MCNC: 45 MG/DL
CALCIUM SERPL-MCNC: 9.2 MG/DL
CHLORIDE SERPL-SCNC: 105 MMOL/L
CO2 SERPL-SCNC: 26 MMOL/L
CREAT SERPL-MCNC: 2.03 MG/DL
EOSINOPHIL # BLD AUTO: 0.44 K/UL
EOSINOPHIL NFR BLD AUTO: 6.8 %
ESTIMATED AVERAGE GLUCOSE: 108 MG/DL
GLUCOSE SERPL-MCNC: 90 MG/DL
HBA1C MFR BLD HPLC: 5.4 %
HCT VFR BLD CALC: 37.4 %
HGB BLD-MCNC: 11.5 G/DL
IMM GRANULOCYTES NFR BLD AUTO: 0.2 %
LYMPHOCYTES # BLD AUTO: 0.83 K/UL
LYMPHOCYTES NFR BLD AUTO: 12.8 %
MAN DIFF?: NORMAL
MCHC RBC-ENTMCNC: 28.4 PG
MCHC RBC-ENTMCNC: 30.7 GM/DL
MCV RBC AUTO: 92.3 FL
MONOCYTES # BLD AUTO: 0.46 K/UL
MONOCYTES NFR BLD AUTO: 7.1 %
NEUTROPHILS # BLD AUTO: 4.7 K/UL
NEUTROPHILS NFR BLD AUTO: 72.8 %
PLATELET # BLD AUTO: 189 K/UL
POTASSIUM SERPL-SCNC: 5.1 MMOL/L
PROT SERPL-MCNC: 6.7 G/DL
RBC # BLD: 4.05 M/UL
RBC # FLD: 15 %
SODIUM SERPL-SCNC: 143 MMOL/L
WBC # FLD AUTO: 6.46 K/UL

## 2020-01-01 ENCOUNTER — APPOINTMENT (OUTPATIENT)
Dept: INTERNAL MEDICINE | Facility: CLINIC | Age: 80
End: 2020-01-01
Payer: MEDICARE

## 2020-01-01 ENCOUNTER — APPOINTMENT (OUTPATIENT)
Dept: CARDIOLOGY | Facility: CLINIC | Age: 80
End: 2020-01-01
Payer: MEDICARE

## 2020-01-01 ENCOUNTER — NON-APPOINTMENT (OUTPATIENT)
Age: 80
End: 2020-01-01

## 2020-01-01 ENCOUNTER — APPOINTMENT (OUTPATIENT)
Dept: INTERNAL MEDICINE | Facility: CLINIC | Age: 80
End: 2020-01-01

## 2020-01-01 ENCOUNTER — APPOINTMENT (OUTPATIENT)
Dept: CARDIOLOGY | Facility: CLINIC | Age: 80
End: 2020-01-01

## 2020-01-01 ENCOUNTER — LABORATORY RESULT (OUTPATIENT)
Age: 80
End: 2020-01-01

## 2020-01-01 VITALS
DIASTOLIC BLOOD PRESSURE: 93 MMHG | BODY MASS INDEX: 27.44 KG/M2 | WEIGHT: 170 LBS | SYSTOLIC BLOOD PRESSURE: 203 MMHG | HEART RATE: 59 BPM

## 2020-01-01 VITALS — SYSTOLIC BLOOD PRESSURE: 134 MMHG | DIASTOLIC BLOOD PRESSURE: 68 MMHG

## 2020-01-01 VITALS
WEIGHT: 164 LBS | BODY MASS INDEX: 26.36 KG/M2 | DIASTOLIC BLOOD PRESSURE: 80 MMHG | SYSTOLIC BLOOD PRESSURE: 150 MMHG | HEIGHT: 66 IN

## 2020-01-01 VITALS
SYSTOLIC BLOOD PRESSURE: 127 MMHG | BODY MASS INDEX: 28.28 KG/M2 | DIASTOLIC BLOOD PRESSURE: 57 MMHG | HEIGHT: 66 IN | HEART RATE: 66 BPM | WEIGHT: 176 LBS

## 2020-01-01 VITALS
DIASTOLIC BLOOD PRESSURE: 68 MMHG | OXYGEN SATURATION: 9 % | BODY MASS INDEX: 27.92 KG/M2 | SYSTOLIC BLOOD PRESSURE: 122 MMHG | TEMPERATURE: 97.5 F | HEART RATE: 67 BPM | WEIGHT: 173 LBS

## 2020-01-01 VITALS — WEIGHT: 181 LBS | BODY MASS INDEX: 29.09 KG/M2 | HEIGHT: 66 IN

## 2020-01-01 VITALS — OXYGEN SATURATION: 96 %

## 2020-01-01 VITALS — SYSTOLIC BLOOD PRESSURE: 155 MMHG | DIASTOLIC BLOOD PRESSURE: 80 MMHG

## 2020-01-01 VITALS — HEIGHT: 66 IN | WEIGHT: 182 LBS | BODY MASS INDEX: 29.25 KG/M2

## 2020-01-01 VITALS
OXYGEN SATURATION: 96 % | HEIGHT: 66 IN | WEIGHT: 173 LBS | BODY MASS INDEX: 27.8 KG/M2 | TEMPERATURE: 97.9 F | SYSTOLIC BLOOD PRESSURE: 110 MMHG | DIASTOLIC BLOOD PRESSURE: 80 MMHG | HEART RATE: 88 BPM

## 2020-01-01 VITALS — WEIGHT: 189 LBS | SYSTOLIC BLOOD PRESSURE: 190 MMHG | BODY MASS INDEX: 30.51 KG/M2 | DIASTOLIC BLOOD PRESSURE: 90 MMHG

## 2020-01-01 VITALS — SYSTOLIC BLOOD PRESSURE: 144 MMHG | DIASTOLIC BLOOD PRESSURE: 96 MMHG

## 2020-01-01 VITALS — WEIGHT: 177 LBS | BODY MASS INDEX: 28.57 KG/M2

## 2020-01-01 VITALS — SYSTOLIC BLOOD PRESSURE: 128 MMHG | DIASTOLIC BLOOD PRESSURE: 70 MMHG

## 2020-01-01 VITALS — HEART RATE: 78 BPM | SYSTOLIC BLOOD PRESSURE: 196 MMHG | DIASTOLIC BLOOD PRESSURE: 95 MMHG

## 2020-01-01 VITALS — DIASTOLIC BLOOD PRESSURE: 70 MMHG | SYSTOLIC BLOOD PRESSURE: 120 MMHG

## 2020-01-01 VITALS
OXYGEN SATURATION: 94 % | HEART RATE: 59 BPM | SYSTOLIC BLOOD PRESSURE: 126 MMHG | BODY MASS INDEX: 29.54 KG/M2 | WEIGHT: 183 LBS | TEMPERATURE: 97.4 F | DIASTOLIC BLOOD PRESSURE: 82 MMHG

## 2020-01-01 VITALS
WEIGHT: 175 LBS | HEIGHT: 66 IN | DIASTOLIC BLOOD PRESSURE: 80 MMHG | BODY MASS INDEX: 28.12 KG/M2 | SYSTOLIC BLOOD PRESSURE: 140 MMHG

## 2020-01-01 VITALS
HEIGHT: 66 IN | DIASTOLIC BLOOD PRESSURE: 72 MMHG | WEIGHT: 163 LBS | HEART RATE: 60 BPM | BODY MASS INDEX: 26.2 KG/M2 | SYSTOLIC BLOOD PRESSURE: 147 MMHG

## 2020-01-01 VITALS — DIASTOLIC BLOOD PRESSURE: 74 MMHG | SYSTOLIC BLOOD PRESSURE: 138 MMHG

## 2020-01-01 VITALS
DIASTOLIC BLOOD PRESSURE: 78 MMHG | BODY MASS INDEX: 27.48 KG/M2 | HEIGHT: 66 IN | SYSTOLIC BLOOD PRESSURE: 132 MMHG | WEIGHT: 171 LBS

## 2020-01-01 VITALS — DIASTOLIC BLOOD PRESSURE: 64 MMHG | HEART RATE: 67 BPM | SYSTOLIC BLOOD PRESSURE: 115 MMHG

## 2020-01-01 VITALS
OXYGEN SATURATION: 97 % | BODY MASS INDEX: 27.76 KG/M2 | TEMPERATURE: 97.8 F | HEART RATE: 60 BPM | WEIGHT: 172 LBS | DIASTOLIC BLOOD PRESSURE: 80 MMHG | SYSTOLIC BLOOD PRESSURE: 156 MMHG

## 2020-01-01 VITALS — SYSTOLIC BLOOD PRESSURE: 168 MMHG | DIASTOLIC BLOOD PRESSURE: 80 MMHG

## 2020-01-01 VITALS — WEIGHT: 182 LBS | BODY MASS INDEX: 29.25 KG/M2 | HEIGHT: 66 IN

## 2020-01-01 VITALS
WEIGHT: 174 LBS | SYSTOLIC BLOOD PRESSURE: 126 MMHG | DIASTOLIC BLOOD PRESSURE: 68 MMHG | BODY MASS INDEX: 27.97 KG/M2 | HEIGHT: 66 IN | HEART RATE: 62 BPM

## 2020-01-01 VITALS — HEART RATE: 66 BPM | DIASTOLIC BLOOD PRESSURE: 68 MMHG | SYSTOLIC BLOOD PRESSURE: 126 MMHG

## 2020-01-01 VITALS — BODY MASS INDEX: 28.28 KG/M2 | WEIGHT: 176 LBS | HEIGHT: 66 IN

## 2020-01-01 VITALS — DIASTOLIC BLOOD PRESSURE: 78 MMHG | SYSTOLIC BLOOD PRESSURE: 138 MMHG

## 2020-01-01 VITALS — WEIGHT: 163 LBS | BODY MASS INDEX: 26.2 KG/M2 | HEIGHT: 66 IN

## 2020-01-01 VITALS — HEIGHT: 66 IN | WEIGHT: 170 LBS | BODY MASS INDEX: 27.32 KG/M2

## 2020-01-01 VITALS — BODY MASS INDEX: 27.16 KG/M2 | HEIGHT: 66 IN | WEIGHT: 169 LBS

## 2020-01-01 VITALS — SYSTOLIC BLOOD PRESSURE: 128 MMHG | DIASTOLIC BLOOD PRESSURE: 78 MMHG

## 2020-01-01 VITALS
SYSTOLIC BLOOD PRESSURE: 134 MMHG | WEIGHT: 178 LBS | TEMPERATURE: 97.6 F | HEART RATE: 1 BPM | DIASTOLIC BLOOD PRESSURE: 82 MMHG | OXYGEN SATURATION: 96 % | BODY MASS INDEX: 28.73 KG/M2

## 2020-01-01 VITALS
HEIGHT: 66 IN | BODY MASS INDEX: 29.25 KG/M2 | WEIGHT: 182 LBS | DIASTOLIC BLOOD PRESSURE: 70 MMHG | SYSTOLIC BLOOD PRESSURE: 110 MMHG

## 2020-01-01 VITALS — SYSTOLIC BLOOD PRESSURE: 114 MMHG | DIASTOLIC BLOOD PRESSURE: 70 MMHG

## 2020-01-01 VITALS — DIASTOLIC BLOOD PRESSURE: 80 MMHG | SYSTOLIC BLOOD PRESSURE: 136 MMHG

## 2020-01-01 VITALS — SYSTOLIC BLOOD PRESSURE: 130 MMHG | DIASTOLIC BLOOD PRESSURE: 80 MMHG

## 2020-01-01 VITALS — SYSTOLIC BLOOD PRESSURE: 120 MMHG | DIASTOLIC BLOOD PRESSURE: 70 MMHG

## 2020-01-01 VITALS
HEART RATE: 66 BPM | SYSTOLIC BLOOD PRESSURE: 116 MMHG | TEMPERATURE: 94.4 F | WEIGHT: 181 LBS | BODY MASS INDEX: 29.21 KG/M2 | DIASTOLIC BLOOD PRESSURE: 72 MMHG | OXYGEN SATURATION: 97 %

## 2020-01-01 VITALS — WEIGHT: 176 LBS | HEIGHT: 66 IN | BODY MASS INDEX: 28.28 KG/M2

## 2020-01-01 DIAGNOSIS — L40.9 PSORIASIS, UNSPECIFIED: ICD-10-CM

## 2020-01-01 DIAGNOSIS — I49.5 SICK SINUS SYNDROME: ICD-10-CM

## 2020-01-01 DIAGNOSIS — J45.909 UNSPECIFIED ASTHMA, UNCOMPLICATED: ICD-10-CM

## 2020-01-01 DIAGNOSIS — R63.4 ABNORMAL WEIGHT LOSS: ICD-10-CM

## 2020-01-01 DIAGNOSIS — M48.062 SPINAL STENOSIS, LUMBAR REGION WITH NEUROGENIC CLAUDICATION: ICD-10-CM

## 2020-01-01 DIAGNOSIS — Z23 ENCOUNTER FOR IMMUNIZATION: ICD-10-CM

## 2020-01-01 DIAGNOSIS — E87.5 HYPERKALEMIA: ICD-10-CM

## 2020-01-01 DIAGNOSIS — L29.8 OTHER PRURITUS: ICD-10-CM

## 2020-01-01 DIAGNOSIS — I25.10 ATHEROSCLEROTIC HEART DISEASE OF NATIVE CORONARY ARTERY W/OUT ANGINA PECTORIS: ICD-10-CM

## 2020-01-01 DIAGNOSIS — R06.02 SHORTNESS OF BREATH: ICD-10-CM

## 2020-01-01 DIAGNOSIS — N40.0 BENIGN PROSTATIC HYPERPLASIA WITHOUT LOWER URINARY TRACT SYMPMS: ICD-10-CM

## 2020-01-01 DIAGNOSIS — R14.0 ABDOMINAL DISTENSION (GASEOUS): ICD-10-CM

## 2020-01-01 LAB
ALBUMIN SERPL ELPH-MCNC: 3.5 G/DL
ALBUMIN SERPL ELPH-MCNC: 3.7 G/DL
ALBUMIN SERPL ELPH-MCNC: 3.8 G/DL
ALBUMIN SERPL ELPH-MCNC: 3.9 G/DL
ALBUMIN SERPL ELPH-MCNC: 4 G/DL
ALBUMIN SERPL ELPH-MCNC: 4.1 G/DL
ALBUMIN SERPL ELPH-MCNC: 4.1 G/DL
ALBUMIN SERPL ELPH-MCNC: 4.4 G/DL
ALP BLD-CCNC: 64 U/L
ALP BLD-CCNC: 64 U/L
ALP BLD-CCNC: 66 U/L
ALP BLD-CCNC: 70 U/L
ALP BLD-CCNC: 78 U/L
ALP BLD-CCNC: 80 U/L
ALP BLD-CCNC: 86 U/L
ALP BLD-CCNC: 93 U/L
ALT SERPL-CCNC: 18 U/L
ALT SERPL-CCNC: 18 U/L
ALT SERPL-CCNC: 20 U/L
ALT SERPL-CCNC: 21 U/L
ALT SERPL-CCNC: 23 U/L
ALT SERPL-CCNC: 24 U/L
ALT SERPL-CCNC: 26 U/L
ALT SERPL-CCNC: 34 U/L
AMYLASE/CREAT SERPL: 74 U/L
ANION GAP SERPL CALC-SCNC: 11 MMOL/L
ANION GAP SERPL CALC-SCNC: 12 MMOL/L
ANION GAP SERPL CALC-SCNC: 12 MMOL/L
ANION GAP SERPL CALC-SCNC: 13 MMOL/L
ANION GAP SERPL CALC-SCNC: 14 MMOL/L
ANION GAP SERPL CALC-SCNC: 14 MMOL/L
ANION GAP SERPL CALC-SCNC: 15 MMOL/L
ANION GAP SERPL CALC-SCNC: 9 MMOL/L
ANION GAP SERPL CALC-SCNC: 9 MMOL/L
AST SERPL-CCNC: 15 U/L
AST SERPL-CCNC: 19 U/L
AST SERPL-CCNC: 19 U/L
AST SERPL-CCNC: 20 U/L
AST SERPL-CCNC: 22 U/L
AST SERPL-CCNC: 23 U/L
AST SERPL-CCNC: 24 U/L
AST SERPL-CCNC: 25 U/L
BASOPHILS # BLD AUTO: 0.02 K/UL
BASOPHILS # BLD AUTO: 0.03 K/UL
BASOPHILS NFR BLD AUTO: 0.2 %
BASOPHILS NFR BLD AUTO: 0.2 %
BASOPHILS NFR BLD AUTO: 0.3 %
BASOPHILS NFR BLD AUTO: 0.3 %
BASOPHILS NFR BLD AUTO: 0.4 %
BILIRUB SERPL-MCNC: 0.2 MG/DL
BILIRUB SERPL-MCNC: 0.3 MG/DL
BILIRUB SERPL-MCNC: 0.3 MG/DL
BILIRUB SERPL-MCNC: 0.4 MG/DL
BILIRUB SERPL-MCNC: 0.4 MG/DL
BILIRUB SERPL-MCNC: 0.6 MG/DL
BUN SERPL-MCNC: 33 MG/DL
BUN SERPL-MCNC: 49 MG/DL
BUN SERPL-MCNC: 54 MG/DL
BUN SERPL-MCNC: 55 MG/DL
BUN SERPL-MCNC: 63 MG/DL
BUN SERPL-MCNC: 65 MG/DL
BUN SERPL-MCNC: 70 MG/DL
BUN SERPL-MCNC: 71 MG/DL
BUN SERPL-MCNC: 73 MG/DL
CALCIUM SERPL-MCNC: 9 MG/DL
CALCIUM SERPL-MCNC: 9.1 MG/DL
CALCIUM SERPL-MCNC: 9.1 MG/DL
CALCIUM SERPL-MCNC: 9.2 MG/DL
CALCIUM SERPL-MCNC: 9.5 MG/DL
CALCIUM SERPL-MCNC: 9.6 MG/DL
CALCIUM SERPL-MCNC: 9.9 MG/DL
CHLORIDE SERPL-SCNC: 105 MMOL/L
CHLORIDE SERPL-SCNC: 106 MMOL/L
CHLORIDE SERPL-SCNC: 108 MMOL/L
CHLORIDE SERPL-SCNC: 109 MMOL/L
CHLORIDE SERPL-SCNC: 111 MMOL/L
CO2 SERPL-SCNC: 22 MMOL/L
CO2 SERPL-SCNC: 22 MMOL/L
CO2 SERPL-SCNC: 23 MMOL/L
CO2 SERPL-SCNC: 23 MMOL/L
CO2 SERPL-SCNC: 24 MMOL/L
CO2 SERPL-SCNC: 25 MMOL/L
CO2 SERPL-SCNC: 25 MMOL/L
CO2 SERPL-SCNC: 26 MMOL/L
CO2 SERPL-SCNC: 26 MMOL/L
CREAT SERPL-MCNC: 1.79 MG/DL
CREAT SERPL-MCNC: 1.97 MG/DL
CREAT SERPL-MCNC: 2.06 MG/DL
CREAT SERPL-MCNC: 2.15 MG/DL
CREAT SERPL-MCNC: 2.16 MG/DL
CREAT SERPL-MCNC: 2.28 MG/DL
CREAT SERPL-MCNC: 2.44 MG/DL
CREAT SERPL-MCNC: 2.54 MG/DL
CREAT SERPL-MCNC: 2.91 MG/DL
EOSINOPHIL # BLD AUTO: 0.03 K/UL
EOSINOPHIL # BLD AUTO: 0.06 K/UL
EOSINOPHIL # BLD AUTO: 0.15 K/UL
EOSINOPHIL # BLD AUTO: 0.2 K/UL
EOSINOPHIL # BLD AUTO: 0.35 K/UL
EOSINOPHIL NFR BLD AUTO: 0.3 %
EOSINOPHIL NFR BLD AUTO: 0.9 %
EOSINOPHIL NFR BLD AUTO: 1.5 %
EOSINOPHIL NFR BLD AUTO: 2.7 %
EOSINOPHIL NFR BLD AUTO: 4.9 %
GLUCOSE SERPL-MCNC: 100 MG/DL
GLUCOSE SERPL-MCNC: 102 MG/DL
GLUCOSE SERPL-MCNC: 111 MG/DL
GLUCOSE SERPL-MCNC: 131 MG/DL
GLUCOSE SERPL-MCNC: 144 MG/DL
GLUCOSE SERPL-MCNC: 152 MG/DL
GLUCOSE SERPL-MCNC: 85 MG/DL
GLUCOSE SERPL-MCNC: 89 MG/DL
GLUCOSE SERPL-MCNC: 98 MG/DL
HCT VFR BLD CALC: 32.9 %
HCT VFR BLD CALC: 35.3 %
HCT VFR BLD CALC: 37.2 %
HCT VFR BLD CALC: 37.3 %
HCT VFR BLD CALC: 44.9 %
HGB BLD-MCNC: 10.3 G/DL
HGB BLD-MCNC: 11.5 G/DL
HGB BLD-MCNC: 11.9 G/DL
HGB BLD-MCNC: 12.1 G/DL
HGB BLD-MCNC: 13.3 G/DL
IMM GRANULOCYTES NFR BLD AUTO: 0.1 %
IMM GRANULOCYTES NFR BLD AUTO: 0.4 %
IMM GRANULOCYTES NFR BLD AUTO: 1 %
IMM GRANULOCYTES NFR BLD AUTO: 1 %
IMM GRANULOCYTES NFR BLD AUTO: 1.5 %
INR PPP: 1.24 RATIO
INR PPP: 1.3 RATIO
INR PPP: 1.7 RATIO
INR PPP: 1.8 RATIO
INR PPP: 1.9 RATIO
INR PPP: 2 RATIO
INR PPP: 2 RATIO
INR PPP: 2.3 RATIO
INR PPP: 2.4 RATIO
INR PPP: 2.5 RATIO
INR PPP: 2.6 RATIO
INR PPP: 2.6 RATIO
INR PPP: 2.7 RATIO
INR PPP: 2.7 RATIO
INR PPP: 2.8 RATIO
INR PPP: 2.9 RATIO
INR PPP: 3 RATIO
INR PPP: 3.2 RATIO
INR PPP: 4 RATIO
INR PPP: 4.31 RATIO
INR PPP: 4.6 RATIO
INR PPP: 4.8 RATIO
LPL SERPL-CCNC: 17 U/L
LYMPHOCYTES # BLD AUTO: 0.34 K/UL
LYMPHOCYTES # BLD AUTO: 0.7 K/UL
LYMPHOCYTES # BLD AUTO: 0.85 K/UL
LYMPHOCYTES # BLD AUTO: 0.88 K/UL
LYMPHOCYTES # BLD AUTO: 1.16 K/UL
LYMPHOCYTES NFR BLD AUTO: 12.4 %
LYMPHOCYTES NFR BLD AUTO: 15.8 %
LYMPHOCYTES NFR BLD AUTO: 5 %
LYMPHOCYTES NFR BLD AUTO: 6.6 %
LYMPHOCYTES NFR BLD AUTO: 8.4 %
MAN DIFF?: NORMAL
MCHC RBC-ENTMCNC: 28.7 PG
MCHC RBC-ENTMCNC: 29.1 PG
MCHC RBC-ENTMCNC: 29.6 GM/DL
MCHC RBC-ENTMCNC: 30 PG
MCHC RBC-ENTMCNC: 30.1 PG
MCHC RBC-ENTMCNC: 30.8 GM/DL
MCHC RBC-ENTMCNC: 31.3 GM/DL
MCHC RBC-ENTMCNC: 32 GM/DL
MCHC RBC-ENTMCNC: 32.4 PG
MCHC RBC-ENTMCNC: 34.3 GM/DL
MCV RBC AUTO: 91 FL
MCV RBC AUTO: 94.4 FL
MCV RBC AUTO: 95.9 FL
MCV RBC AUTO: 97 FL
MCV RBC AUTO: 97.6 FL
MONOCYTES # BLD AUTO: 0.17 K/UL
MONOCYTES # BLD AUTO: 0.46 K/UL
MONOCYTES # BLD AUTO: 0.55 K/UL
MONOCYTES # BLD AUTO: 0.71 K/UL
MONOCYTES # BLD AUTO: 0.78 K/UL
MONOCYTES NFR BLD AUTO: 2.5 %
MONOCYTES NFR BLD AUTO: 6.5 %
MONOCYTES NFR BLD AUTO: 6.7 %
MONOCYTES NFR BLD AUTO: 7.5 %
MONOCYTES NFR BLD AUTO: 7.7 %
NEUTROPHILS # BLD AUTO: 5.33 K/UL
NEUTROPHILS # BLD AUTO: 5.39 K/UL
NEUTROPHILS # BLD AUTO: 6.16 K/UL
NEUTROPHILS # BLD AUTO: 8.18 K/UL
NEUTROPHILS # BLD AUTO: 8.98 K/UL
NEUTROPHILS NFR BLD AUTO: 72.7 %
NEUTROPHILS NFR BLD AUTO: 75.7 %
NEUTROPHILS NFR BLD AUTO: 81.2 %
NEUTROPHILS NFR BLD AUTO: 84.7 %
NEUTROPHILS NFR BLD AUTO: 90.9 %
NT-PROBNP SERPL-MCNC: 3375 PG/ML
NT-PROBNP SERPL-MCNC: 3453 PG/ML
NT-PROBNP SERPL-MCNC: 4395 PG/ML
NT-PROBNP SERPL-MCNC: 5217 PG/ML
NT-PROBNP SERPL-MCNC: 6168 PG/ML
NT-PROBNP SERPL-MCNC: 6585 PG/ML
NT-PROBNP SERPL-MCNC: 7005 PG/ML
NT-PROBNP SERPL-MCNC: 7748 PG/ML
PLATELET # BLD AUTO: 175 K/UL
PLATELET # BLD AUTO: 181 K/UL
PLATELET # BLD AUTO: 189 K/UL
PLATELET # BLD AUTO: 208 K/UL
PLATELET # BLD AUTO: 226 K/UL
POTASSIUM SERPL-SCNC: 4.8 MMOL/L
POTASSIUM SERPL-SCNC: 4.9 MMOL/L
POTASSIUM SERPL-SCNC: 5 MMOL/L
POTASSIUM SERPL-SCNC: 5.1 MMOL/L
POTASSIUM SERPL-SCNC: 5.1 MMOL/L
POTASSIUM SERPL-SCNC: 5.2 MMOL/L
POTASSIUM SERPL-SCNC: 5.4 MMOL/L
POTASSIUM SERPL-SCNC: 5.9 MMOL/L
POTASSIUM SERPL-SCNC: 6.3 MMOL/L
PROT SERPL-MCNC: 5.6 G/DL
PROT SERPL-MCNC: 5.9 G/DL
PROT SERPL-MCNC: 5.9 G/DL
PROT SERPL-MCNC: 6.1 G/DL
PROT SERPL-MCNC: 6.1 G/DL
PROT SERPL-MCNC: 6.2 G/DL
PROT SERPL-MCNC: 6.3 G/DL
PROT SERPL-MCNC: 6.9 G/DL
PT BLD: 14.5 SEC
PT BLD: 47.4 SEC
RBC # BLD: 3.43 M/UL
RBC # BLD: 3.74 M/UL
RBC # BLD: 3.82 M/UL
RBC # BLD: 4.09 M/UL
RBC # BLD: 4.63 M/UL
RBC # FLD: 13.9 %
RBC # FLD: 14 %
RBC # FLD: 14.6 %
RBC # FLD: 15.4 %
RBC # FLD: 16.3 %
SODIUM SERPL-SCNC: 142 MMOL/L
SODIUM SERPL-SCNC: 142 MMOL/L
SODIUM SERPL-SCNC: 143 MMOL/L
SODIUM SERPL-SCNC: 144 MMOL/L
SODIUM SERPL-SCNC: 144 MMOL/L
SODIUM SERPL-SCNC: 145 MMOL/L
SODIUM SERPL-SCNC: 145 MMOL/L
SODIUM SERPL-SCNC: 146 MMOL/L
SODIUM SERPL-SCNC: 146 MMOL/L
TROPONIN I SERPL-MCNC: 0.03 NG/ML
WBC # FLD AUTO: 10.08 K/UL
WBC # FLD AUTO: 10.6 K/UL
WBC # FLD AUTO: 6.78 K/UL
WBC # FLD AUTO: 7.12 K/UL
WBC # FLD AUTO: 7.33 K/UL

## 2020-01-01 PROCEDURE — G0008: CPT

## 2020-01-01 PROCEDURE — 99215 OFFICE O/P EST HI 40 MIN: CPT | Mod: 25

## 2020-01-01 PROCEDURE — 99205 OFFICE O/P NEW HI 60 MIN: CPT

## 2020-01-01 PROCEDURE — 99496 TRANSJ CARE MGMT HIGH F2F 7D: CPT | Mod: 25

## 2020-01-01 PROCEDURE — 36415 COLL VENOUS BLD VENIPUNCTURE: CPT

## 2020-01-01 PROCEDURE — 99214 OFFICE O/P EST MOD 30 MIN: CPT | Mod: 25

## 2020-01-01 PROCEDURE — 85610 PROTHROMBIN TIME: CPT | Mod: QW

## 2020-01-01 PROCEDURE — 93000 ELECTROCARDIOGRAM COMPLETE: CPT

## 2020-01-01 PROCEDURE — 99214 OFFICE O/P EST MOD 30 MIN: CPT

## 2020-01-01 PROCEDURE — 90686 IIV4 VACC NO PRSV 0.5 ML IM: CPT

## 2020-01-01 RX ORDER — TRAZODONE HYDROCHLORIDE 50 MG/1
50 TABLET ORAL TWICE DAILY
Qty: 60 | Refills: 3 | Status: COMPLETED | COMMUNITY
Start: 2020-01-01 | End: 2020-01-01

## 2020-01-01 RX ORDER — PREDNISONE 10 MG/1
10 TABLET ORAL DAILY
Qty: 90 | Refills: 3 | Status: COMPLETED | COMMUNITY
Start: 2020-02-24 | End: 2020-01-01

## 2020-01-01 RX ORDER — DULOXETINE HYDROCHLORIDE 30 MG/1
30 CAPSULE, DELAYED RELEASE PELLETS ORAL
Qty: 30 | Refills: 3 | Status: DISCONTINUED | COMMUNITY
Start: 1900-01-01 | End: 2020-01-01

## 2020-01-01 RX ORDER — LEVOCETIRIZINE DIHYDROCHLORIDE 5 MG/1
5 TABLET ORAL DAILY
Qty: 10 | Refills: 10 | Status: ACTIVE | COMMUNITY
Start: 2020-01-01 | End: 1900-01-01

## 2020-01-01 RX ORDER — TORSEMIDE 10 MG/1
10 TABLET ORAL
Qty: 180 | Refills: 3 | Status: COMPLETED | COMMUNITY
End: 2020-01-01

## 2020-01-01 RX ORDER — SOTALOL HYDROCHLORIDE 80 MG/1
80 TABLET ORAL
Qty: 180 | Refills: 3 | Status: COMPLETED | COMMUNITY
Start: 2018-11-30 | End: 2020-01-01

## 2020-01-01 RX ORDER — ESZOPICLONE 1 MG/1
1 TABLET, FILM COATED ORAL
Qty: 30 | Refills: 0 | Status: COMPLETED | COMMUNITY
Start: 2019-07-30 | End: 2020-01-01

## 2020-01-01 RX ORDER — METOPROLOL SUCCINATE 100 MG/1
100 TABLET, EXTENDED RELEASE ORAL DAILY
Qty: 90 | Refills: 3 | Status: COMPLETED | COMMUNITY
Start: 2020-01-01 | End: 2020-01-01

## 2020-01-01 RX ORDER — ISOSORBIDE MONONITRATE 30 MG/1
30 TABLET, EXTENDED RELEASE ORAL
Qty: 90 | Refills: 1 | Status: ACTIVE | COMMUNITY
Start: 2020-01-01 | End: 1900-01-01

## 2020-01-01 RX ORDER — SODIUM POLYSTYRENE SULFONATE 4.1 MEQ/G
POWDER, FOR SUSPENSION ORAL; RECTAL DAILY
Qty: 1 | Refills: 10 | Status: ACTIVE | COMMUNITY
Start: 2020-01-01 | End: 1900-01-01

## 2020-01-01 RX ORDER — DULOXETINE HYDROCHLORIDE 20 MG/1
20 CAPSULE, DELAYED RELEASE PELLETS ORAL DAILY
Qty: 30 | Refills: 10 | Status: COMPLETED | COMMUNITY
Start: 2020-01-01 | End: 2020-01-01

## 2020-01-13 ENCOUNTER — RESULT CHARGE (OUTPATIENT)
Age: 80
End: 2020-01-13

## 2020-01-13 ENCOUNTER — APPOINTMENT (OUTPATIENT)
Dept: INTERNAL MEDICINE | Facility: CLINIC | Age: 80
End: 2020-01-13
Payer: MEDICARE

## 2020-01-13 VITALS — BODY MASS INDEX: 26.39 KG/M2 | SYSTOLIC BLOOD PRESSURE: 130 MMHG | DIASTOLIC BLOOD PRESSURE: 85 MMHG | HEIGHT: 66.5 IN

## 2020-01-13 VITALS — BODY MASS INDEX: 26.39 KG/M2 | WEIGHT: 166 LBS

## 2020-01-13 LAB — INR PPP: 1.7 RATIO

## 2020-01-13 PROCEDURE — 85610 PROTHROMBIN TIME: CPT | Mod: QW

## 2020-01-13 PROCEDURE — 99214 OFFICE O/P EST MOD 30 MIN: CPT | Mod: 25

## 2020-01-13 RX ORDER — LEVALBUTEROL HYDROCHLORIDE 0.63 MG/3ML
0.63 SOLUTION RESPIRATORY (INHALATION)
Qty: 3 | Refills: 3 | Status: ACTIVE | COMMUNITY
Start: 2020-01-13 | End: 1900-01-01

## 2020-01-13 NOTE — ASSESSMENT
[FreeTextEntry1] : INR 1.7 on lower dose of warfarin\par has resumed physical therapy and enjoys it and finds it helps \par moving air well on exam today\par no signs of congestive heart failure on exam\par s/p stent hx MI and reduced EF

## 2020-01-13 NOTE — PHYSICAL EXAM
[No Acute Distress] : no acute distress [Well Nourished] : well nourished [Well Developed] : well developed [Well-Appearing] : well-appearing [Normal Sclera/Conjunctiva] : normal sclera/conjunctiva [Normal Outer Ear/Nose] : the outer ears and nose were normal in appearance [Normal Oropharynx] : the oropharynx was normal [No JVD] : no jugular venous distention [Supple] : supple [No Lymphadenopathy] : no lymphadenopathy [Thyroid Normal, No Nodules] : the thyroid was normal and there were no nodules present [Clear to Auscultation] : lungs were clear to auscultation bilaterally [No Accessory Muscle Use] : no accessory muscle use [No Respiratory Distress] : no respiratory distress  [Normal Rate] : normal rate  [Rhythm Regular] : regular [Normal S1] : normal S1 [Distant] : the heart sounds were distant [I] : a grade 1 [No Varicosities] : no varicosities [No Carotid Bruits] : no carotid bruits [No Abdominal Bruit] : a ~M bruit was not heard ~T in the abdomen [Pedal Pulses Present] : the pedal pulses are present [No Palpable Aorta] : no palpable aorta [No Extremity Clubbing/Cyanosis] : no extremity clubbing/cyanosis [___ +] : bilateral [unfilled]U+ pretibial pitting edema [Soft] : abdomen soft [Non Tender] : non-tender [Non-distended] : non-distended [No HSM] : no HSM [No Masses] : no abdominal mass palpated [Normal Bowel Sounds] : normal bowel sounds [Normal Posterior Cervical Nodes] : no posterior cervical lymphadenopathy [No CVA Tenderness] : no CVA  tenderness [Normal Anterior Cervical Nodes] : no anterior cervical lymphadenopathy [No Spinal Tenderness] : no spinal tenderness [No Joint Swelling] : no joint swelling [Normal Gait] : normal gait [Grossly Normal Strength/Tone] : grossly normal strength/tone [No Focal Deficits] : no focal deficits [Coordination Grossly Intact] : coordination grossly intact [Speech Grossly Normal] : speech grossly normal [Deep Tendon Reflexes (DTR)] : deep tendon reflexes were 2+ and symmetric [Memory Grossly Normal] : memory grossly normal [Normal Affect] : the affect was normal [Alert and Oriented x3] : oriented to person, place, and time [Normal Insight/Judgement] : insight and judgment were intact [Normal S2] : abnormal S2 [de-identified] : psoriasis

## 2020-01-13 NOTE — HISTORY OF PRESENT ILLNESS
[FreeTextEntry1] : here to follow ongoing conditions [de-identified] :  stable shortness of breath on exertion \par  long history of COPD and asthma\par last EF 35 to 40% on echo MUGA EF 47% just had echo a few minutes ago\par history of coronary artery disease and past myocardial infarction and most in April of last year had a stent to a large 90% circumflex lesion with an ejection fraction at that time estimated at 30%\par echocardiogram was done estimating an ejection fraction of 35-40%\par renal insufficiency has been stable\par INR 1.7 today\par depressive symptoms on duloxetine, declines to see psychologist or psychiatrist, not in any way suicidal\par back in PT doing better, feels it helps, enjoys it\par on metformin 500, last A1C normal\par sees [facility] for PPI \par \par

## 2020-01-13 NOTE — REVIEW OF SYSTEMS
[Nocturia] : nocturia [Back Pain] : back pain [Depression] : depression [Negative] : Heme/Lymph [Fever] : no fever [Hot Flashes] : no hot flashes [Chills] : no chills [Chest Pain] : no chest pain [Recent Change In Weight] : ~T no recent weight change [Night Sweats] : no night sweats [Palpitations] : no palpitations [Claudication] : no  leg claudication [Orthopena] : no orthopnea [Lower Ext Edema] : no lower extremity edema [Wheezing] : no wheezing [Cough] : no cough [Shortness Of Breath] : no shortness of breath [Abdominal Pain] : no abdominal pain [Dyspnea on Exertion] : not dyspnea on exertion [Nausea] : no nausea [Diarrhea] : no diarrhea [Constipation] : no constipation [Heartburn] : no heartburn [Vomiting] : no vomiting [Dysuria] : no dysuria [Melena] : no melena [Incontinence] : no incontinence [Hesitancy] : no hesitancy [Hematuria] : no hematuria [Frequency] : no frequency [Poor Libido] : libido not poor [Impotence] : no impotency [Itching] : no itching [Mole Changes] : no mole changes [Joint Stiffness] : no joint stiffness [Hair Changes] : no hair changes [Nail Changes] : no nail changes [Skin Rash] : no skin rash [Headache] : no headache [Dizziness] : no dizziness [Confusion] : no confusion [Fainting] : no fainting [Suicidal] : not suicidal [Memory Loss] : no memory loss [Insomnia] : no insomnia [Anxiety] : no anxiety

## 2020-02-04 ENCOUNTER — APPOINTMENT (OUTPATIENT)
Dept: INTERNAL MEDICINE | Facility: CLINIC | Age: 80
End: 2020-02-04
Payer: MEDICARE

## 2020-02-04 VITALS
HEIGHT: 66.5 IN | BODY MASS INDEX: 25.73 KG/M2 | DIASTOLIC BLOOD PRESSURE: 74 MMHG | WEIGHT: 162 LBS | SYSTOLIC BLOOD PRESSURE: 128 MMHG

## 2020-02-04 LAB — INR PPP: 2.4 RATIO

## 2020-02-04 PROCEDURE — 99214 OFFICE O/P EST MOD 30 MIN: CPT | Mod: 25

## 2020-02-04 PROCEDURE — 85610 PROTHROMBIN TIME: CPT | Mod: QW

## 2020-02-04 NOTE — PHYSICAL EXAM
[No Acute Distress] : no acute distress [Well Developed] : well developed [Well Nourished] : well nourished [Normal Sclera/Conjunctiva] : normal sclera/conjunctiva [Well-Appearing] : well-appearing [No JVD] : no jugular venous distention [Normal Outer Ear/Nose] : the outer ears and nose were normal in appearance [Normal Oropharynx] : the oropharynx was normal [Supple] : supple [No Lymphadenopathy] : no lymphadenopathy [Thyroid Normal, No Nodules] : the thyroid was normal and there were no nodules present [Clear to Auscultation] : lungs were clear to auscultation bilaterally [No Accessory Muscle Use] : no accessory muscle use [No Respiratory Distress] : no respiratory distress  [Rhythm Regular] : regular [Normal Rate] : normal rate  [Normal S1] : normal S1 [Distant] : the heart sounds were distant [No Abdominal Bruit] : a ~M bruit was not heard ~T in the abdomen [No Carotid Bruits] : no carotid bruits [I] : a grade 1 [Pedal Pulses Present] : the pedal pulses are present [No Extremity Clubbing/Cyanosis] : no extremity clubbing/cyanosis [No Varicosities] : no varicosities [___ +] : bilateral [unfilled]U+ pretibial pitting edema [No Palpable Aorta] : no palpable aorta [Non Tender] : non-tender [Non-distended] : non-distended [Soft] : abdomen soft [No Masses] : no abdominal mass palpated [Normal Bowel Sounds] : normal bowel sounds [No HSM] : no HSM [Normal Posterior Cervical Nodes] : no posterior cervical lymphadenopathy [No CVA Tenderness] : no CVA  tenderness [Normal Anterior Cervical Nodes] : no anterior cervical lymphadenopathy [No Spinal Tenderness] : no spinal tenderness [No Joint Swelling] : no joint swelling [Grossly Normal Strength/Tone] : grossly normal strength/tone [Coordination Grossly Intact] : coordination grossly intact [Normal Gait] : normal gait [Speech Grossly Normal] : speech grossly normal [No Focal Deficits] : no focal deficits [Deep Tendon Reflexes (DTR)] : deep tendon reflexes were 2+ and symmetric [Memory Grossly Normal] : memory grossly normal [Normal Affect] : the affect was normal [Alert and Oriented x3] : oriented to person, place, and time [Normal Insight/Judgement] : insight and judgment were intact [de-identified] : psoriasis [Normal S2] : abnormal S2

## 2020-02-04 NOTE — ASSESSMENT
[FreeTextEntry1] : INR perfect\par has resumed physical therapy and enjoys it and finds it helps, had it today and did great\par moving air well on exam \par no signs of congestive heart failure on exam\par s/p stent hx MI and reduced EF

## 2020-02-04 NOTE — HISTORY OF PRESENT ILLNESS
[FreeTextEntry1] : here to follow ongoing conditions [de-identified] :  stable shortness of breath on exertion \par  long history of COPD and asthma, stable\par last EF 35 to 40% on echo MUGA EF 47% just had echo a few minutes ago\par history of coronary artery disease and past myocardial infarction and most in April of last year had a stent to a large 90% circumflex lesion with an ejection fraction at that time estimated at 30%\par echocardiogram was done estimating an ejection fraction of 35-40%\par renal insufficiency has been stable\par INR 2.4 today\par depressive symptoms on duloxetine, declines to see psychologist or psychiatrist, not in any way suicidal\par back in PT doing better, feels it helps, enjoys it\par on metformin 500, last A1C normal 5.4\par sees [facility] for PPI \par \par

## 2020-02-24 ENCOUNTER — NON-APPOINTMENT (OUTPATIENT)
Age: 80
End: 2020-02-24

## 2020-02-24 ENCOUNTER — APPOINTMENT (OUTPATIENT)
Dept: INTERNAL MEDICINE | Facility: CLINIC | Age: 80
End: 2020-02-24
Payer: MEDICARE

## 2020-02-24 VITALS — WEIGHT: 165 LBS | BODY MASS INDEX: 26.23 KG/M2

## 2020-02-24 VITALS
DIASTOLIC BLOOD PRESSURE: 90 MMHG | BODY MASS INDEX: 26.23 KG/M2 | SYSTOLIC BLOOD PRESSURE: 130 MMHG | HEART RATE: 72 BPM | HEIGHT: 66.5 IN

## 2020-02-24 VITALS — OXYGEN SATURATION: 96 %

## 2020-02-24 LAB — INR PPP: 2.5 RATIO

## 2020-02-24 PROCEDURE — 93000 ELECTROCARDIOGRAM COMPLETE: CPT

## 2020-02-24 PROCEDURE — 99215 OFFICE O/P EST HI 40 MIN: CPT | Mod: 25

## 2020-02-24 PROCEDURE — 36415 COLL VENOUS BLD VENIPUNCTURE: CPT

## 2020-02-24 PROCEDURE — 85610 PROTHROMBIN TIME: CPT | Mod: QW

## 2020-02-24 NOTE — REVIEW OF SYSTEMS
[Shortness Of Breath] : shortness of breath [Nocturia] : nocturia [Back Pain] : back pain [Depression] : depression [Negative] : Heme/Lymph [Chills] : no chills [Hot Flashes] : no hot flashes [Fever] : no fever [Night Sweats] : no night sweats [Recent Change In Weight] : ~T no recent weight change [Palpitations] : no palpitations [Chest Pain] : no chest pain [Lower Ext Edema] : no lower extremity edema [Claudication] : no  leg claudication [Cough] : no cough [Orthopena] : no orthopnea [Wheezing] : no wheezing [Dyspnea on Exertion] : not dyspnea on exertion [Abdominal Pain] : no abdominal pain [Nausea] : no nausea [Diarrhea] : no diarrhea [Constipation] : no constipation [Melena] : no melena [Vomiting] : no vomiting [Heartburn] : no heartburn [Dysuria] : no dysuria [Incontinence] : no incontinence [Frequency] : no frequency [Hematuria] : no hematuria [Hesitancy] : no hesitancy [Poor Libido] : libido not poor [Impotence] : no impotency [Joint Stiffness] : no joint stiffness [Mole Changes] : no mole changes [Itching] : no itching [Hair Changes] : no hair changes [Nail Changes] : no nail changes [Dizziness] : no dizziness [Skin Rash] : no skin rash [Headache] : no headache [Fainting] : no fainting [Confusion] : no confusion [Memory Loss] : no memory loss [Suicidal] : not suicidal [Insomnia] : no insomnia [Anxiety] : no anxiety [FreeTextEntry5] : chest pressure with exertion

## 2020-02-24 NOTE — ASSESSMENT
[FreeTextEntry1] : symptoms most likely pulmonary just had levalbuterol before coming in\par ECG not helpful due to PPI\par INR perfect\par past improvement from this with steroids\par Symptoms are not at all like what he had when he had his past stent with chest pain or his past coronary artery disease symptoms

## 2020-02-24 NOTE — PLAN
[FreeTextEntry1] : start prednisone \par pulmonary follow up\par check bloods\par RV 1 to 2 days\par ER if chest pain occurs\par CXR\par

## 2020-02-24 NOTE — HISTORY OF PRESENT ILLNESS
[FreeTextEntry1] : shortness of breath [de-identified] :  worsening shortness of breath over last 2 weeks, especially with exertion\par  long history of COPD and asthma, abruptly worse over last 2 weeks, notes some wheezing\par last EF 35 to 40% on echo 12/2019\par history of coronary artery disease and past myocardial infarction and most in April of 2018 had a stent to a large 90% circumflex lesion with an ejection fraction at that time estimated at 30%\par echocardiogram was done estimating an ejection fraction of 35-40%\par using nebulizer levalbuterol recently for this, minimal relief\par renal insufficiency has been stable\par INR 2.5 today for PAF\par depressive symptoms on duloxetine, declines to see psychologist or psychiatrist, not in any way suicidal\par back in PT doing better, feels it helps, enjoys it, has been unable to do PT last 2 weeks due to breathing\par sees [facility] for PPI \par no recent visits to Dr Mercer\par \par

## 2020-02-24 NOTE — PHYSICAL EXAM
[No Acute Distress] : no acute distress [Well Nourished] : well nourished [Well Developed] : well developed [Well-Appearing] : well-appearing [Normal Sclera/Conjunctiva] : normal sclera/conjunctiva [No JVD] : no jugular venous distention [Normal Oropharynx] : the oropharynx was normal [Normal Outer Ear/Nose] : the outer ears and nose were normal in appearance [Supple] : supple [No Lymphadenopathy] : no lymphadenopathy [No Respiratory Distress] : no respiratory distress  [Thyroid Normal, No Nodules] : the thyroid was normal and there were no nodules present [Rales / Crackles Bilateral] : no rales or crackles were heard [No Accessory Muscle Use] : no accessory muscle use [Rhonchi Bilateral] : no rhonchi were heard [Wheezing Bilaterally] : no wheezing was heard [Decreased Breath Sounds Bilaterally] : breath sounds were diminished over both lungs [Normal Rate] : normal rate  [Bronchial Breath Sounds Bilaterally] : no bronchial breath sounds were heard [Premature Beats] : regular with premature beats [Normal S1] : normal S1 [I] : a grade 1 [Distant] : the heart sounds were distant [No Abdominal Bruit] : a ~M bruit was not heard ~T in the abdomen [No Carotid Bruits] : no carotid bruits [Pedal Pulses Present] : the pedal pulses are present [No Varicosities] : no varicosities [___ +] : bilateral [unfilled]U+ pretibial pitting edema [No Extremity Clubbing/Cyanosis] : no extremity clubbing/cyanosis [No Palpable Aorta] : no palpable aorta [Soft] : abdomen soft [Non Tender] : non-tender [No HSM] : no HSM [Non-distended] : non-distended [No Masses] : no abdominal mass palpated [Normal Posterior Cervical Nodes] : no posterior cervical lymphadenopathy [Normal Bowel Sounds] : normal bowel sounds [Normal Anterior Cervical Nodes] : no anterior cervical lymphadenopathy [No CVA Tenderness] : no CVA  tenderness [No Joint Swelling] : no joint swelling [No Spinal Tenderness] : no spinal tenderness [Grossly Normal Strength/Tone] : grossly normal strength/tone [Normal Gait] : normal gait [Coordination Grossly Intact] : coordination grossly intact [Deep Tendon Reflexes (DTR)] : deep tendon reflexes were 2+ and symmetric [No Focal Deficits] : no focal deficits [Speech Grossly Normal] : speech grossly normal [Memory Grossly Normal] : memory grossly normal [Normal Affect] : the affect was normal [Alert and Oriented x3] : oriented to person, place, and time [Normal Insight/Judgement] : insight and judgment were intact [Normal S2] : abnormal S2 [de-identified] : mild expiratory prolongation, no rales [de-identified] : psoriasis

## 2020-02-25 LAB
ALBUMIN SERPL ELPH-MCNC: 4 G/DL
ALP BLD-CCNC: 122 U/L
ALT SERPL-CCNC: 38 U/L
ANION GAP SERPL CALC-SCNC: 11 MMOL/L
AST SERPL-CCNC: 35 U/L
BASOPHILS # BLD AUTO: 0.02 K/UL
BASOPHILS NFR BLD AUTO: 0.4 %
BILIRUB SERPL-MCNC: 0.4 MG/DL
BUN SERPL-MCNC: 33 MG/DL
CALCIUM SERPL-MCNC: 9.6 MG/DL
CHLORIDE SERPL-SCNC: 105 MMOL/L
CK SERPL-CCNC: 46 U/L
CO2 SERPL-SCNC: 28 MMOL/L
CREAT SERPL-MCNC: 1.86 MG/DL
EOSINOPHIL # BLD AUTO: 0.36 K/UL
EOSINOPHIL NFR BLD AUTO: 6.5 %
GLUCOSE SERPL-MCNC: 93 MG/DL
HCT VFR BLD CALC: 37.2 %
HGB BLD-MCNC: 11.2 G/DL
IMM GRANULOCYTES NFR BLD AUTO: 0.5 %
LYMPHOCYTES # BLD AUTO: 0.72 K/UL
LYMPHOCYTES NFR BLD AUTO: 12.9 %
MAN DIFF?: NORMAL
MCHC RBC-ENTMCNC: 28.6 PG
MCHC RBC-ENTMCNC: 30.1 GM/DL
MCV RBC AUTO: 95.1 FL
MONOCYTES # BLD AUTO: 0.44 K/UL
MONOCYTES NFR BLD AUTO: 7.9 %
NEUTROPHILS # BLD AUTO: 4 K/UL
NEUTROPHILS NFR BLD AUTO: 71.8 %
NT-PROBNP SERPL-MCNC: ABNORMAL PG/ML
PLATELET # BLD AUTO: 215 K/UL
POTASSIUM SERPL-SCNC: 5.4 MMOL/L
PROT SERPL-MCNC: 6.5 G/DL
RBC # BLD: 3.91 M/UL
RBC # FLD: 15.6 %
SODIUM SERPL-SCNC: 144 MMOL/L
T4 FREE SERPL-MCNC: 1.2 NG/DL
TROPONIN I SERPL-MCNC: 0.03 NG/ML
TSH SERPL-ACNC: 4.4 UIU/ML
WBC # FLD AUTO: 5.57 K/UL

## 2020-03-10 ENCOUNTER — APPOINTMENT (OUTPATIENT)
Dept: INTERNAL MEDICINE | Facility: CLINIC | Age: 80
End: 2020-03-10
Payer: MEDICARE

## 2020-03-10 VITALS — WEIGHT: 161 LBS | BODY MASS INDEX: 25.6 KG/M2

## 2020-03-10 VITALS — SYSTOLIC BLOOD PRESSURE: 136 MMHG | DIASTOLIC BLOOD PRESSURE: 80 MMHG | HEIGHT: 66.5 IN

## 2020-03-10 LAB — INR PPP: 3 RATIO

## 2020-03-10 PROCEDURE — 99214 OFFICE O/P EST MOD 30 MIN: CPT | Mod: 25

## 2020-03-10 PROCEDURE — 85610 PROTHROMBIN TIME: CPT | Mod: QW

## 2020-03-10 NOTE — PHYSICAL EXAM
[No Acute Distress] : no acute distress [Well Nourished] : well nourished [Well Developed] : well developed [Well-Appearing] : well-appearing [Normal Sclera/Conjunctiva] : normal sclera/conjunctiva [PERRL] : pupils equal round and reactive to light [EOMI] : extraocular movements intact [Normal Outer Ear/Nose] : the outer ears and nose were normal in appearance [Normal Oropharynx] : the oropharynx was normal [No JVD] : no jugular venous distention [No Lymphadenopathy] : no lymphadenopathy [Supple] : supple [Thyroid Normal, No Nodules] : the thyroid was normal and there were no nodules present [No Respiratory Distress] : no respiratory distress  [No Accessory Muscle Use] : no accessory muscle use [Clear to Auscultation] : lungs were clear to auscultation bilaterally [Normal Rate] : normal rate  [Regular Rhythm] : with a regular rhythm [Normal S1] : normal S1 [Normal S2] : normal S2 [Distant] : the heart sounds were distant [I] : a grade 1 [No Carotid Bruits] : no carotid bruits [No Abdominal Bruit] : a ~M bruit was not heard ~T in the abdomen [No Varicosities] : no varicosities [Pedal Pulses Present] : the pedal pulses are present [No Edema] : there was no peripheral edema [No Palpable Aorta] : no palpable aorta [No Extremity Clubbing/Cyanosis] : no extremity clubbing/cyanosis [Soft] : abdomen soft [Non Tender] : non-tender [Non-distended] : non-distended [No Masses] : no abdominal mass palpated [No HSM] : no HSM [Normal Bowel Sounds] : normal bowel sounds [Normal Posterior Cervical Nodes] : no posterior cervical lymphadenopathy [Normal Anterior Cervical Nodes] : no anterior cervical lymphadenopathy [No CVA Tenderness] : no CVA  tenderness [No Spinal Tenderness] : no spinal tenderness [No Joint Swelling] : no joint swelling [Grossly Normal Strength/Tone] : grossly normal strength/tone [No Rash] : no rash [Coordination Grossly Intact] : coordination grossly intact [No Focal Deficits] : no focal deficits [Normal Gait] : normal gait [Deep Tendon Reflexes (DTR)] : deep tendon reflexes were 2+ and symmetric [Normal Affect] : the affect was normal [Normal Insight/Judgement] : insight and judgment were intact [de-identified] : minimal decreased breath sounds

## 2020-03-10 NOTE — ASSESSMENT
[FreeTextEntry1] : vast improvement from steroids\par much better\par no angina\par INR ok\par no CHF\par no AF

## 2020-03-10 NOTE — HISTORY OF PRESENT ILLNESS
[FreeTextEntry1] : here to follow ongoing conditions [de-identified] :  worsening shortness of breath recently improved with steroids\par  long history of COPD and asthma\par last EF 35 to 40% on echo 12/2019\par history of coronary artery disease and past myocardial infarction in April of 2018 had a stent to a large 90% circumflex lesion with an ejection fraction at that time estimated at 30%\par echocardiogram was done with an ejection fraction of 35-40%\par using nebulizer levalbuterol recently able to come off it\par renal insufficiency has been stable\par INR 3.0 today for PAF\par depressive symptoms on duloxetine, declines to see psychologist or psychiatrist, not in any way suicidal\par back in PT doing better, feels it helps\par sees [facility] for PPI \par no recent visits to Dr Mercer\par now on 1/2 of 10 mg last 2 days\par \par

## 2020-03-10 NOTE — REVIEW OF SYSTEMS
[Nocturia] : nocturia [Back Pain] : back pain [Negative] : Heme/Lymph [Fever] : no fever [Chills] : no chills [Hot Flashes] : no hot flashes [Night Sweats] : no night sweats [Recent Change In Weight] : ~T no recent weight change [Chest Pain] : no chest pain [Palpitations] : no palpitations [Claudication] : no  leg claudication [Lower Ext Edema] : no lower extremity edema [Orthopena] : no orthopnea [Shortness Of Breath] : no shortness of breath [Wheezing] : no wheezing [Cough] : no cough [Dyspnea on Exertion] : not dyspnea on exertion [Abdominal Pain] : no abdominal pain [Nausea] : no nausea [Constipation] : no constipation [Diarrhea] : no diarrhea [Vomiting] : no vomiting [Heartburn] : no heartburn [Melena] : no melena [Dysuria] : no dysuria [Incontinence] : no incontinence [Hesitancy] : no hesitancy [Hematuria] : no hematuria [Frequency] : no frequency [Impotence] : no impotency [Poor Libido] : libido not poor [Joint Stiffness] : no joint stiffness [Itching] : no itching [Mole Changes] : no mole changes [Nail Changes] : no nail changes [Hair Changes] : no hair changes [Skin Rash] : no skin rash [Headache] : no headache [Dizziness] : no dizziness [Fainting] : no fainting [Confusion] : no confusion [Memory Loss] : no memory loss [Suicidal] : not suicidal [Insomnia] : no insomnia [Anxiety] : no anxiety [Depression] : no depression

## 2020-04-13 ENCOUNTER — APPOINTMENT (OUTPATIENT)
Dept: INTERNAL MEDICINE | Facility: CLINIC | Age: 80
End: 2020-04-13

## 2020-04-14 ENCOUNTER — APPOINTMENT (OUTPATIENT)
Dept: INTERNAL MEDICINE | Facility: CLINIC | Age: 80
End: 2020-04-14
Payer: MEDICARE

## 2020-04-14 PROCEDURE — 99441: CPT

## 2020-05-06 ENCOUNTER — APPOINTMENT (OUTPATIENT)
Dept: INTERNAL MEDICINE | Facility: CLINIC | Age: 80
End: 2020-05-06
Payer: MEDICARE

## 2020-05-06 PROCEDURE — 99213 OFFICE O/P EST LOW 20 MIN: CPT | Mod: 95

## 2020-05-06 NOTE — PHYSICAL EXAM
[No Acute Distress] : no acute distress [Well Nourished] : well nourished [Well Developed] : well developed [Normal Sclera/Conjunctiva] : normal sclera/conjunctiva [No Respiratory Distress] : no respiratory distress

## 2020-05-06 NOTE — ASSESSMENT
[FreeTextEntry1] : Doing well following strict isolation and social distance into his age and multiple comorbidities and significant lung disease\par His wife felt a little headachy a few weeks ago and had a negative nasopharyngeal test for the covid-19\par His INR was therapeutic on a home draw although it was not scanned into the chart and we have another one set up for the middle this month\par Hopefully by June he we'll be able to come in to be examined\par he should be doing his exercises which he has not am concerned that he will lose the progress he has made with physical therapy\par His breathing remains well controlled\par He remains angina free

## 2020-05-06 NOTE — HISTORY OF PRESENT ILLNESS
[Home] : at home, [unfilled] , at the time of the visit. [Medical Office: ([facility])___] : at the medical office located in  [Patient] : the patient [Self] : self [FreeTextEntry1] : to follow ongoing conditions [de-identified] : long history of COPD and asthma\par last EF 35 to 40% on echo 12/2019\par history of coronary artery disease and past myocardial infarction in April of 2018 had a stent to a large 90% circumflex lesion with an ejection fraction at that time estimated at 30%\par echocardiogram was done with an ejection fraction of 35-40%\par renal insufficiency has been stable\par INR ok with last home draw in range, [facility]\par depressive symptoms on duloxetine, declines to see psychologist or psychiatrist, not in any way suicidal\par back  PT on hold I have encouraged him to exercise at home\par sees [facility] for PPI \par no recent visits to Dr Mercer\par \par

## 2020-05-18 ENCOUNTER — APPOINTMENT (OUTPATIENT)
Dept: INTERNAL MEDICINE | Facility: CLINIC | Age: 80
End: 2020-05-18
Payer: MEDICARE

## 2020-05-18 VITALS
HEART RATE: 67 BPM | TEMPERATURE: 97.5 F | HEIGHT: 66 IN | SYSTOLIC BLOOD PRESSURE: 176 MMHG | DIASTOLIC BLOOD PRESSURE: 87 MMHG | WEIGHT: 168 LBS | OXYGEN SATURATION: 97 % | BODY MASS INDEX: 27 KG/M2

## 2020-05-18 VITALS — DIASTOLIC BLOOD PRESSURE: 90 MMHG | SYSTOLIC BLOOD PRESSURE: 140 MMHG

## 2020-05-18 PROCEDURE — 99214 OFFICE O/P EST MOD 30 MIN: CPT | Mod: 25

## 2020-05-18 PROCEDURE — 36415 COLL VENOUS BLD VENIPUNCTURE: CPT

## 2020-05-18 RX ORDER — MOMETASONE FUROATE AND FORMOTEROL FUMARATE DIHYDRATE 200; 5 UG/1; UG/1
200-5 AEROSOL RESPIRATORY (INHALATION)
Qty: 3 | Refills: 3 | Status: ACTIVE | COMMUNITY
Start: 1900-01-01 | End: 1900-01-01

## 2020-05-18 RX ORDER — ATORVASTATIN CALCIUM 40 MG/1
40 TABLET, FILM COATED ORAL
Qty: 90 | Refills: 3 | Status: ACTIVE | COMMUNITY
Start: 1900-01-01 | End: 1900-01-01

## 2020-05-18 NOTE — PLAN
[FreeTextEntry1] : Check bloods\par Continue same medications\par Do better job avoiding salt in diet\par Recheck pressure in a few weeks

## 2020-05-18 NOTE — HISTORY OF PRESENT ILLNESS
[FreeTextEntry1] : to follow ongoing conditions [de-identified] : long history of COPD and asthma\par last EF 35 to 40% on echo 12/2019\par history of coronary artery disease and past myocardial infarction in April of 2018 had a stent to a large 90% circumflex lesion with an ejection fraction at that time estimated at 30%\par echocardiogram was done with an ejection fraction of 35-40% 12/23/2019\par renal insufficiency has been stable\par INR ok with last home draw in range, [facility] , on warfarin for past PAF\par depressive symptoms on duloxetine, declines to see psychologist or psychiatrist, stable\par PT on hold I have encourage him to exercise at home\par sees [facility] for PPI \par no recent visits to Dr Mercer\par notes leg swelling is worse last 4 days\par ordering out more, chinese food 5 days ago\par \par

## 2020-05-18 NOTE — ASSESSMENT
[FreeTextEntry1] : Patient has mild edema which is more concerning to his wife and him that it is to me on my exam as I have seen and much worse than this\par His breathing has been very stable and his lungs sound clear with good air movement today\par His blood pressure is a bit elevated and I suspect that the edema and the pressure elevation are from inadvertent salt in the diet due to Chinese food and pizza ordering due to pandemic

## 2020-05-18 NOTE — REVIEW OF SYSTEMS
[Lower Ext Edema] : lower extremity edema [Nocturia] : nocturia [Back Pain] : back pain [Negative] : Heme/Lymph [Fever] : no fever [Chills] : no chills [Hot Flashes] : no hot flashes [Night Sweats] : no night sweats [Recent Change In Weight] : ~T no recent weight change [Chest Pain] : no chest pain [Palpitations] : no palpitations [Claudication] : no  leg claudication [Shortness Of Breath] : no shortness of breath [Orthopena] : no orthopnea [Wheezing] : no wheezing [Dyspnea on Exertion] : not dyspnea on exertion [Cough] : no cough [Abdominal Pain] : no abdominal pain [Nausea] : no nausea [Diarrhea] : no diarrhea [Constipation] : no constipation [Vomiting] : no vomiting [Melena] : no melena [Heartburn] : no heartburn [Hesitancy] : no hesitancy [Dysuria] : no dysuria [Incontinence] : no incontinence [Frequency] : no frequency [Hematuria] : no hematuria [Impotence] : no impotency [Joint Stiffness] : no joint stiffness [Poor Libido] : libido not poor [Nail Changes] : no nail changes [Mole Changes] : no mole changes [Itching] : no itching [Headache] : no headache [Hair Changes] : no hair changes [Skin Rash] : no skin rash [Dizziness] : no dizziness [Fainting] : no fainting [Confusion] : no confusion [Memory Loss] : no memory loss [Anxiety] : no anxiety [Suicidal] : not suicidal [Insomnia] : no insomnia [Depression] : no depression

## 2020-05-18 NOTE — PHYSICAL EXAM
[No Acute Distress] : no acute distress [Well Nourished] : well nourished [Well Developed] : well developed [Normal Sclera/Conjunctiva] : normal sclera/conjunctiva [Well-Appearing] : well-appearing [EOMI] : extraocular movements intact [PERRL] : pupils equal round and reactive to light [Normal Outer Ear/Nose] : the outer ears and nose were normal in appearance [Normal Oropharynx] : the oropharynx was normal [No Lymphadenopathy] : no lymphadenopathy [No JVD] : no jugular venous distention [Supple] : supple [No Respiratory Distress] : no respiratory distress  [Thyroid Normal, No Nodules] : the thyroid was normal and there were no nodules present [No Accessory Muscle Use] : no accessory muscle use [Clear to Auscultation] : lungs were clear to auscultation bilaterally [Normal Rate] : normal rate  [Regular Rhythm] : with a regular rhythm [Normal S1] : normal S1 [Normal S2] : normal S2 [Distant] : the heart sounds were distant [I] : a grade 1 [No Carotid Bruits] : no carotid bruits [No Abdominal Bruit] : a ~M bruit was not heard ~T in the abdomen [Pedal Pulses Present] : the pedal pulses are present [No Varicosities] : no varicosities [No Palpable Aorta] : no palpable aorta [No Extremity Clubbing/Cyanosis] : no extremity clubbing/cyanosis [Soft] : abdomen soft [Non Tender] : non-tender [Non-distended] : non-distended [No Masses] : no abdominal mass palpated [Normal Bowel Sounds] : normal bowel sounds [No HSM] : no HSM [Normal Posterior Cervical Nodes] : no posterior cervical lymphadenopathy [Normal Anterior Cervical Nodes] : no anterior cervical lymphadenopathy [No CVA Tenderness] : no CVA  tenderness [No Spinal Tenderness] : no spinal tenderness [No Joint Swelling] : no joint swelling [Grossly Normal Strength/Tone] : grossly normal strength/tone [No Rash] : no rash [No Focal Deficits] : no focal deficits [Coordination Grossly Intact] : coordination grossly intact [Deep Tendon Reflexes (DTR)] : deep tendon reflexes were 2+ and symmetric [Normal Gait] : normal gait [Normal Affect] : the affect was normal [Normal Insight/Judgement] : insight and judgment were intact [___ +] : bilateral [unfilled]U+ pitting edema to the ankles [de-identified] : minimal decreased breath sounds

## 2020-05-19 LAB
ALBUMIN SERPL ELPH-MCNC: 4 G/DL
ALP BLD-CCNC: 87 U/L
ALT SERPL-CCNC: 22 U/L
ANION GAP SERPL CALC-SCNC: 12 MMOL/L
AST SERPL-CCNC: 22 U/L
BASOPHILS # BLD AUTO: 0.02 K/UL
BASOPHILS NFR BLD AUTO: 0.4 %
BILIRUB SERPL-MCNC: 0.4 MG/DL
BUN SERPL-MCNC: 41 MG/DL
CALCIUM SERPL-MCNC: 9.4 MG/DL
CHLORIDE SERPL-SCNC: 103 MMOL/L
CO2 SERPL-SCNC: 29 MMOL/L
CREAT SERPL-MCNC: 1.94 MG/DL
EOSINOPHIL # BLD AUTO: 0.45 K/UL
EOSINOPHIL NFR BLD AUTO: 7.9 %
GLUCOSE SERPL-MCNC: 81 MG/DL
HCT VFR BLD CALC: 37.3 %
HGB BLD-MCNC: 11.6 G/DL
IMM GRANULOCYTES NFR BLD AUTO: 0.4 %
INR PPP: 1.77 RATIO
LYMPHOCYTES # BLD AUTO: 0.74 K/UL
LYMPHOCYTES NFR BLD AUTO: 13 %
MAN DIFF?: NORMAL
MCHC RBC-ENTMCNC: 28.9 PG
MCHC RBC-ENTMCNC: 31.1 GM/DL
MCV RBC AUTO: 92.8 FL
MONOCYTES # BLD AUTO: 0.45 K/UL
MONOCYTES NFR BLD AUTO: 7.9 %
NEUTROPHILS # BLD AUTO: 4 K/UL
NEUTROPHILS NFR BLD AUTO: 70.4 %
NT-PROBNP SERPL-MCNC: 7955 PG/ML
PLATELET # BLD AUTO: 176 K/UL
POTASSIUM SERPL-SCNC: 4.7 MMOL/L
PROT SERPL-MCNC: 6.5 G/DL
PT BLD: 20.4 SEC
RBC # BLD: 4.02 M/UL
RBC # FLD: 17.2 %
SARS-COV-2 IGG SERPL IA-ACNC: 0.1 INDEX
SARS-COV-2 IGG SERPL QL IA: NEGATIVE
SODIUM SERPL-SCNC: 144 MMOL/L
WBC # FLD AUTO: 5.68 K/UL

## 2020-05-28 ENCOUNTER — APPOINTMENT (OUTPATIENT)
Dept: INTERNAL MEDICINE | Facility: CLINIC | Age: 80
End: 2020-05-28
Payer: MEDICARE

## 2020-05-28 PROCEDURE — 93306 TTE W/DOPPLER COMPLETE: CPT

## 2020-06-22 NOTE — PHYSICAL EXAM
[No Acute Distress] : no acute distress [Well Nourished] : well nourished [Well Developed] : well developed [Normal Sclera/Conjunctiva] : normal sclera/conjunctiva [Well-Appearing] : well-appearing [Normal Outer Ear/Nose] : the outer ears and nose were normal in appearance [EOMI] : extraocular movements intact [PERRL] : pupils equal round and reactive to light [No JVD] : no jugular venous distention [Normal Oropharynx] : the oropharynx was normal [Thyroid Normal, No Nodules] : the thyroid was normal and there were no nodules present [Supple] : supple [No Lymphadenopathy] : no lymphadenopathy [No Respiratory Distress] : no respiratory distress  [No Accessory Muscle Use] : no accessory muscle use [Normal Rate] : normal rate  [Clear to Auscultation] : lungs were clear to auscultation bilaterally [Regular Rhythm] : with a regular rhythm [Normal S2] : normal S2 [Normal S1] : normal S1 [Distant] : the heart sounds were distant [I] : a grade 1 [No Carotid Bruits] : no carotid bruits [No Abdominal Bruit] : a ~M bruit was not heard ~T in the abdomen [No Varicosities] : no varicosities [Pedal Pulses Present] : the pedal pulses are present [No Palpable Aorta] : no palpable aorta [No Extremity Clubbing/Cyanosis] : no extremity clubbing/cyanosis [Non Tender] : non-tender [Soft] : abdomen soft [No Masses] : no abdominal mass palpated [Non-distended] : non-distended [Normal Posterior Cervical Nodes] : no posterior cervical lymphadenopathy [Normal Bowel Sounds] : normal bowel sounds [No HSM] : no HSM [Normal Anterior Cervical Nodes] : no anterior cervical lymphadenopathy [No CVA Tenderness] : no CVA  tenderness [No Spinal Tenderness] : no spinal tenderness [No Joint Swelling] : no joint swelling [Grossly Normal Strength/Tone] : grossly normal strength/tone [No Focal Deficits] : no focal deficits [Coordination Grossly Intact] : coordination grossly intact [No Rash] : no rash [Normal Gait] : normal gait [Deep Tendon Reflexes (DTR)] : deep tendon reflexes were 2+ and symmetric [Normal Affect] : the affect was normal [Normal Insight/Judgement] : insight and judgment were intact [___ +] : bilateral [unfilled]U+ pretibial pitting edema

## 2020-06-22 NOTE — PLAN
[FreeTextEntry1] : increase hydralazine to 25 bid\par ? increase torsemide as well\par check bloods\par to see [facility] for ? resynchronization on 7/2/2020\par same warfarin

## 2020-06-22 NOTE — ASSESSMENT
[FreeTextEntry1] : BP elevated, some nausea and dizziness today\par dietary indiscretion\par lungs sound clear with good air movement today\par blood pressure is elevated ? from dietary indiscretion\par s/p PPI would benefit form dual chamber pacing

## 2020-06-22 NOTE — HISTORY OF PRESENT ILLNESS
[FreeTextEntry1] : to follow ongoing conditions [de-identified] : long history of COPD and asthma\par history of coronary artery disease and past myocardial infarction in April of 2018 had a stent to a large 90% circumflex lesion with an ejection fraction at that time estimated at 30%\par echocardiogram was done with an ejection fraction of 35-40% 12/23/2019, was 25 - 30 % on echo 5/2020\par renal insufficiency has been stable\par last INR was 1.77, dose warfarin increased, here to recheck that, has past PAF. INR 2.8 today\par depressive symptoms on duloxetine, declines to see psychologist or psychiatrist, stable\par sees [facility] for PPI to consider upgrade to dual chamber resynchronization, has appointment 7/2/2020\par no recent visits to Dr Mercer\par leg swelling worse on last visit, ? from more chinese food and ordering out, still admits dietary non compliance, chinese food 2 days ago, a lot of Italian food yesterday

## 2020-06-24 NOTE — PHYSICAL EXAM
[No Acute Distress] : no acute distress [Well Developed] : well developed [Well Nourished] : well nourished [Well-Appearing] : well-appearing [Normal Sclera/Conjunctiva] : normal sclera/conjunctiva [Normal Outer Ear/Nose] : the outer ears and nose were normal in appearance [No JVD] : no jugular venous distention [Supple] : supple [No Lymphadenopathy] : no lymphadenopathy [Thyroid Normal, No Nodules] : the thyroid was normal and there were no nodules present [No Respiratory Distress] : no respiratory distress  [Clear to Auscultation] : lungs were clear to auscultation bilaterally [No Accessory Muscle Use] : no accessory muscle use [Normal Rate] : normal rate  [Regular Rhythm] : with a regular rhythm [Normal S1] : normal S1 [Normal S2] : normal S2 [Distant] : the heart sounds were distant [I] : a grade 1 [No Carotid Bruits] : no carotid bruits [No Abdominal Bruit] : a ~M bruit was not heard ~T in the abdomen [Pedal Pulses Present] : the pedal pulses are present [No Varicosities] : no varicosities [No Extremity Clubbing/Cyanosis] : no extremity clubbing/cyanosis [No Palpable Aorta] : no palpable aorta [___ +] : bilateral [unfilled]U+ pretibial pitting edema [Soft] : abdomen soft [Non Tender] : non-tender [No Masses] : no abdominal mass palpated [Non-distended] : non-distended [No HSM] : no HSM [Normal Bowel Sounds] : normal bowel sounds [Normal Posterior Cervical Nodes] : no posterior cervical lymphadenopathy [Normal Anterior Cervical Nodes] : no anterior cervical lymphadenopathy [No Spinal Tenderness] : no spinal tenderness [No CVA Tenderness] : no CVA  tenderness [No Joint Swelling] : no joint swelling [Grossly Normal Strength/Tone] : grossly normal strength/tone [No Rash] : no rash [Coordination Grossly Intact] : coordination grossly intact [No Focal Deficits] : no focal deficits [Normal Gait] : normal gait [Normal Affect] : the affect was normal [Normal Insight/Judgement] : insight and judgment were intact [Deep Tendon Reflexes (DTR)] : deep tendon reflexes were 2+ and symmetric

## 2020-06-24 NOTE — ASSESSMENT
[FreeTextEntry1] : not in chf now, lungs clear with good air movement\par suspect some sinus symptoms\par decreased EF and abnormal renal function\par still mild edema\par BP better but still too high\par will hold off on raising diuretic at this time

## 2020-06-24 NOTE — HISTORY OF PRESENT ILLNESS
[FreeTextEntry1] : to follow ongoing conditions [de-identified] : long history of COPD and asthma\par feels he is always filled with phlegm\par breathing is worse at night\par history of coronary artery disease and past myocardial infarction in April of 2018 had a stent to a large 90% circumflex lesion with an ejection fraction at that time estimated at 30%\par echocardiogram was done with an ejection fraction of 35-40% 12/23/2019, was 25 - 30 % on echo 5/2020\par renal insufficiency has been stable\par breathing is stable while walking around house\par depressive symptoms on duloxetine, declines to see psychologist or psychiatrist, stable\par sees [facility] for PPI to consider upgrade to dual chamber resynchronization, has appointment next week\par no recent visits to Dr Mercer\par leg swelling worse on last visit

## 2020-06-24 NOTE — REVIEW OF SYSTEMS
[Postnasal Drip] : postnasal drip [Nasal Discharge] : nasal discharge [Lower Ext Edema] : lower extremity edema [Wheezing] : wheezing [Cough] : cough [Nausea] : nausea [Nocturia] : nocturia [Back Pain] : back pain [Dizziness] : dizziness [Negative] : Heme/Lymph [Fever] : no fever [Chills] : no chills [Hot Flashes] : no hot flashes [Night Sweats] : no night sweats [Recent Change In Weight] : ~T no recent weight change [Chest Pain] : no chest pain [Palpitations] : no palpitations [Orthopena] : no orthopnea [Claudication] : no  leg claudication [Paroxysmal Nocturnal Dyspnea] : no paroxysmal nocturnal dyspnea [Shortness Of Breath] : no shortness of breath [Dyspnea on Exertion] : not dyspnea on exertion [Abdominal Pain] : no abdominal pain [Constipation] : no constipation [Diarrhea] : no diarrhea [Vomiting] : no vomiting [Heartburn] : no heartburn [Melena] : no melena [Dysuria] : no dysuria [Incontinence] : no incontinence [Hesitancy] : no hesitancy [Frequency] : no frequency [Hematuria] : no hematuria [Joint Stiffness] : no joint stiffness [Impotence] : no impotency [Poor Libido] : libido not poor [Mole Changes] : no mole changes [Itching] : no itching [Nail Changes] : no nail changes [Hair Changes] : no hair changes [Skin Rash] : no skin rash [Headache] : no headache [Fainting] : no fainting [Confusion] : no confusion [Memory Loss] : no memory loss [Suicidal] : not suicidal [Insomnia] : no insomnia [Depression] : no depression [Anxiety] : no anxiety

## 2020-07-01 PROBLEM — J45.909 ASTHMA: Status: ACTIVE | Noted: 2018-11-21

## 2020-07-01 NOTE — REVIEW OF SYSTEMS
[Lower Ext Edema] : lower extremity edema [Nausea] : nausea [Nocturia] : nocturia [Back Pain] : back pain [Dizziness] : dizziness [Negative] : Heme/Lymph [Chills] : no chills [Fever] : no fever [Night Sweats] : no night sweats [Hot Flashes] : no hot flashes [Nasal Discharge] : no nasal discharge [Recent Change In Weight] : ~T no recent weight change [Postnasal Drip] : no postnasal drip [Palpitations] : no palpitations [Chest Pain] : no chest pain [Claudication] : no  leg claudication [Orthopena] : no orthopnea [Shortness Of Breath] : shortness of breath [Paroxysmal Nocturnal Dyspnea] : no paroxysmal nocturnal dyspnea [Wheezing] : no wheezing [Cough] : no cough [Constipation] : no constipation [Abdominal Pain] : no abdominal pain [Vomiting] : no vomiting [Diarrhea] : no diarrhea [Melena] : no melena [Dysuria] : no dysuria [Heartburn] : no heartburn [Incontinence] : no incontinence [Hematuria] : no hematuria [Hesitancy] : no hesitancy [Frequency] : no frequency [Impotence] : no impotency [Joint Stiffness] : no joint stiffness [Poor Libido] : libido not poor [Itching] : no itching [Nail Changes] : no nail changes [Mole Changes] : no mole changes [Hair Changes] : no hair changes [Skin Rash] : no skin rash [Headache] : no headache [Fainting] : no fainting [Memory Loss] : no memory loss [Confusion] : no confusion [Insomnia] : no insomnia [Anxiety] : no anxiety [Suicidal] : not suicidal [Depression] : no depression

## 2020-07-01 NOTE — HISTORY OF PRESENT ILLNESS
[FreeTextEntry1] : to follow ongoing conditions [de-identified] : long history of COPD and asthma\par history of coronary artery disease and past myocardial infarction in April of 2018 had a stent to a large 90% circumflex lesion with an ejection fraction at that time estimated at 30%\par echocardiogram was done with an ejection fraction of 35-40% 12/23/2019, was 25 - 30 % on echo 5/2020\par renal insufficiency has been stable\par breathing is stable while walking around house\par depressive symptoms on duloxetine, declines to see psychologist or psychiatrist, stable\par sees [facility] for PPI to consider upgrade to dual chamber resynchronization, has appointment tomorrow\par no recent visits to Dr Mercer\par BP elevated last few visits\par started on Flonase last week for some sinus congestion, feels it has helped

## 2020-07-01 NOTE — PHYSICAL EXAM
[Well Nourished] : well nourished [No Acute Distress] : no acute distress [Well Developed] : well developed [Normal Sclera/Conjunctiva] : normal sclera/conjunctiva [Well-Appearing] : well-appearing [No JVD] : no jugular venous distention [Normal Outer Ear/Nose] : the outer ears and nose were normal in appearance [Supple] : supple [No Lymphadenopathy] : no lymphadenopathy [Thyroid Normal, No Nodules] : the thyroid was normal and there were no nodules present [Clear to Auscultation] : lungs were clear to auscultation bilaterally [No Accessory Muscle Use] : no accessory muscle use [No Respiratory Distress] : no respiratory distress  [Normal S1] : normal S1 [Regular Rhythm] : with a regular rhythm [Normal Rate] : normal rate  [Normal S2] : normal S2 [Distant] : the heart sounds were distant [No Carotid Bruits] : no carotid bruits [I] : a grade 1 [No Abdominal Bruit] : a ~M bruit was not heard ~T in the abdomen [No Varicosities] : no varicosities [Pedal Pulses Present] : the pedal pulses are present [No Palpable Aorta] : no palpable aorta [No Extremity Clubbing/Cyanosis] : no extremity clubbing/cyanosis [Soft] : abdomen soft [___ +] : bilateral [unfilled]U+ pretibial pitting edema [Non Tender] : non-tender [Non-distended] : non-distended [No HSM] : no HSM [No Masses] : no abdominal mass palpated [Normal Bowel Sounds] : normal bowel sounds [Normal Posterior Cervical Nodes] : no posterior cervical lymphadenopathy [Normal Anterior Cervical Nodes] : no anterior cervical lymphadenopathy [No CVA Tenderness] : no CVA  tenderness [No Spinal Tenderness] : no spinal tenderness [Grossly Normal Strength/Tone] : grossly normal strength/tone [No Rash] : no rash [No Joint Swelling] : no joint swelling [Coordination Grossly Intact] : coordination grossly intact [No Focal Deficits] : no focal deficits [Normal Gait] : normal gait [Normal Affect] : the affect was normal [Deep Tendon Reflexes (DTR)] : deep tendon reflexes were 2+ and symmetric [Normal Insight/Judgement] : insight and judgment were intact

## 2020-07-01 NOTE — ASSESSMENT
[FreeTextEntry1] : no signs of chf on exam\par decreased EF and abnormal renal function\par still mild edema\par BP still too high\par will hold off on raising diuretic

## 2020-07-20 NOTE — HISTORY OF PRESENT ILLNESS
[FreeTextEntry1] : to follow ongoing conditions [de-identified] : long history of COPD and asthma\par history of coronary artery disease and past myocardial infarction in April of 2018 had a stent to a large 90% circumflex lesion with an ejection fraction at that time estimated at 30%\par echocardiogram was done with an ejection fraction of 35-40% 12/23/2019, was 25 - 30 % on echo 5/2020\par renal insufficiency has been stable\par breathing is stable while walking around house\par depressive symptoms on duloxetine, declines to see psychologist or psychiatrist, stable\par sees [facility] for PPI upgrade to dual chamber resynchronization/defibrillator in 4 days\par BP elevated last few visits

## 2020-07-20 NOTE — PHYSICAL EXAM
[No Acute Distress] : no acute distress [Well Nourished] : well nourished [Well Developed] : well developed [Normal Sclera/Conjunctiva] : normal sclera/conjunctiva [Well-Appearing] : well-appearing [No JVD] : no jugular venous distention [Normal Outer Ear/Nose] : the outer ears and nose were normal in appearance [No Lymphadenopathy] : no lymphadenopathy [Supple] : supple [Thyroid Normal, No Nodules] : the thyroid was normal and there were no nodules present [No Respiratory Distress] : no respiratory distress  [No Accessory Muscle Use] : no accessory muscle use [Clear to Auscultation] : lungs were clear to auscultation bilaterally [Normal Rate] : normal rate  [Regular Rhythm] : with a regular rhythm [Distant] : the heart sounds were distant [Normal S1] : normal S1 [Normal S2] : normal S2 [I] : a grade 1 [No Carotid Bruits] : no carotid bruits [No Varicosities] : no varicosities [Pedal Pulses Present] : the pedal pulses are present [No Abdominal Bruit] : a ~M bruit was not heard ~T in the abdomen [No Palpable Aorta] : no palpable aorta [No Extremity Clubbing/Cyanosis] : no extremity clubbing/cyanosis [Soft] : abdomen soft [___ +] : bilateral [unfilled]U+ pretibial pitting edema [Non-distended] : non-distended [No Masses] : no abdominal mass palpated [Non Tender] : non-tender [Normal Bowel Sounds] : normal bowel sounds [No HSM] : no HSM [Normal Anterior Cervical Nodes] : no anterior cervical lymphadenopathy [Normal Posterior Cervical Nodes] : no posterior cervical lymphadenopathy [No CVA Tenderness] : no CVA  tenderness [No Spinal Tenderness] : no spinal tenderness [No Joint Swelling] : no joint swelling [No Rash] : no rash [Grossly Normal Strength/Tone] : grossly normal strength/tone [Coordination Grossly Intact] : coordination grossly intact [Normal Gait] : normal gait [No Focal Deficits] : no focal deficits [Deep Tendon Reflexes (DTR)] : deep tendon reflexes were 2+ and symmetric [Normal Affect] : the affect was normal [Normal Insight/Judgement] : insight and judgment were intact

## 2020-07-20 NOTE — REVIEW OF SYSTEMS
[Lower Ext Edema] : lower extremity edema [Shortness Of Breath] : shortness of breath [Nausea] : nausea [Nocturia] : nocturia [Back Pain] : back pain [Dizziness] : dizziness [Negative] : Eyes [Fever] : no fever [Chills] : no chills [Hot Flashes] : no hot flashes [Night Sweats] : no night sweats [Recent Change In Weight] : ~T no recent weight change [Postnasal Drip] : no postnasal drip [Nasal Discharge] : no nasal discharge [Palpitations] : no palpitations [Chest Pain] : no chest pain [Claudication] : no  leg claudication [Orthopena] : no orthopnea [Wheezing] : no wheezing [Paroxysmal Nocturnal Dyspnea] : no paroxysmal nocturnal dyspnea [Abdominal Pain] : no abdominal pain [Cough] : no cough [Constipation] : no constipation [Diarrhea] : no diarrhea [Heartburn] : no heartburn [Vomiting] : no vomiting [Melena] : no melena [Incontinence] : no incontinence [Dysuria] : no dysuria [Hematuria] : no hematuria [Hesitancy] : no hesitancy [Poor Libido] : libido not poor [Impotence] : no impotency [Frequency] : no frequency [Joint Stiffness] : no joint stiffness [Itching] : no itching [Mole Changes] : no mole changes [Hair Changes] : no hair changes [Nail Changes] : no nail changes [Skin Rash] : no skin rash [Headache] : no headache [Fainting] : no fainting [Confusion] : no confusion [Memory Loss] : no memory loss [Suicidal] : not suicidal [Anxiety] : no anxiety [Insomnia] : no insomnia [Depression] : no depression

## 2020-07-20 NOTE — ASSESSMENT
[FreeTextEntry1] : no signs of chf on exam\par decreased EF and abnormal renal function\par still mild edema\par BP better\par INR in range

## 2020-07-31 NOTE — PLAN
[FreeTextEntry1] : continue current meds\par ? lower amlodipine if BP remains good\par get metoprolol dosage\par warm soaks to skin area with derm follw up next week\par CXR today to exclude late pneumothorax or other\par RV 1 week to see Dr Rosa

## 2020-07-31 NOTE — HISTORY OF PRESENT ILLNESS
[Post-hospitalization from ___ Hospital] : Post-hospitalization from [unfilled] Hospital [Admitted on: ___] : The patient was admitted on [unfilled] [Discharged on ___] : discharged on [unfilled] [FreeTextEntry2] : s/p biventricular PPI defibrillator upgrade\par uncomplicated course\par monitored at home remotely now\par sotalol stopped, put on metoprolol \par cxr reportedly ok prior to discharge [de-identified] : long history of COPD and asthma\par history of coronary artery disease and past myocardial infarction in April of 2018 had a stent to a large 90% circumflex lesion with an ejection fraction at that time estimated at 30%\par echocardiogram was done with an ejection fraction of 35-40% 12/23/2019, was 25 - 30 % on echo 5/2020\par renal insufficiency has been stable\par some shortness of breath now, used his nebulizer today\par depressive symptoms on duloxetine, declines to see psychologist or psychiatrist, stable\par BP elevated last few visits although better on most recent visit 7/20/2020\par PAF in past on warfarin to follow today, was held for procedure, INR 2.9 today\par noted skin abnormality upper buttock area last couple of days

## 2020-07-31 NOTE — PHYSICAL EXAM
[Well Nourished] : well nourished [No Acute Distress] : no acute distress [Well-Appearing] : well-appearing [Well Developed] : well developed [Normal Sclera/Conjunctiva] : normal sclera/conjunctiva [Normal Outer Ear/Nose] : the outer ears and nose were normal in appearance [No Lymphadenopathy] : no lymphadenopathy [No JVD] : no jugular venous distention [Supple] : supple [Clear to Auscultation] : lungs were clear to auscultation bilaterally [No Respiratory Distress] : no respiratory distress  [No Accessory Muscle Use] : no accessory muscle use [Regular Rhythm] : with a regular rhythm [Normal Rate] : normal rate  [Normal S1] : normal S1 [Normal S2] : normal S2 [Distant] : the heart sounds were distant [No Carotid Bruits] : no carotid bruits [I] : a grade 1 [Pedal Pulses Present] : the pedal pulses are present [No Varicosities] : no varicosities [No Abdominal Bruit] : a ~M bruit was not heard ~T in the abdomen [No Extremity Clubbing/Cyanosis] : no extremity clubbing/cyanosis [No Palpable Aorta] : no palpable aorta [Soft] : abdomen soft [___ +] : bilateral [unfilled]U+ pretibial pitting edema [Non Tender] : non-tender [Non-distended] : non-distended [No HSM] : no HSM [Normal Bowel Sounds] : normal bowel sounds [No Masses] : no abdominal mass palpated [Normal Posterior Cervical Nodes] : no posterior cervical lymphadenopathy [Normal Anterior Cervical Nodes] : no anterior cervical lymphadenopathy [No Spinal Tenderness] : no spinal tenderness [No Joint Swelling] : no joint swelling [No CVA Tenderness] : no CVA  tenderness [Grossly Normal Strength/Tone] : grossly normal strength/tone [Coordination Grossly Intact] : coordination grossly intact [No Rash] : no rash [No Focal Deficits] : no focal deficits [Deep Tendon Reflexes (DTR)] : deep tendon reflexes were 2+ and symmetric [Normal Gait] : normal gait [Normal Insight/Judgement] : insight and judgment were intact [Normal Affect] : the affect was normal [Rhythm Regular] : regular [de-identified] : ? some induration beginning uper buttock midline  pacer site looks good [de-identified] : air movenment ok, no rales [de-identified] : color good

## 2020-07-31 NOTE — REVIEW OF SYSTEMS
[Lower Ext Edema] : lower extremity edema [Shortness Of Breath] : shortness of breath [Nausea] : nausea [Nocturia] : nocturia [Back Pain] : back pain [Dizziness] : dizziness [Negative] : Heme/Lymph [Chills] : no chills [Fever] : no fever [Night Sweats] : no night sweats [Recent Change In Weight] : ~T no recent weight change [Hot Flashes] : no hot flashes [Chest Pain] : no chest pain [Nasal Discharge] : no nasal discharge [Postnasal Drip] : no postnasal drip [Palpitations] : no palpitations [Claudication] : no  leg claudication [Orthopena] : no orthopnea [Paroxysmal Nocturnal Dyspnea] : no paroxysmal nocturnal dyspnea [Wheezing] : no wheezing [Abdominal Pain] : no abdominal pain [Cough] : no cough [Constipation] : no constipation [Diarrhea] : no diarrhea [Heartburn] : no heartburn [Vomiting] : no vomiting [Dysuria] : no dysuria [Melena] : no melena [Hesitancy] : no hesitancy [Hematuria] : no hematuria [Incontinence] : no incontinence [Poor Libido] : libido not poor [Frequency] : no frequency [Impotence] : no impotency [Joint Stiffness] : no joint stiffness [Mole Changes] : no mole changes [Nail Changes] : no nail changes [Itching] : no itching [Hair Changes] : no hair changes [Headache] : no headache [Skin Rash] : no skin rash [Confusion] : no confusion [Fainting] : no fainting [Suicidal] : not suicidal [Memory Loss] : no memory loss [Insomnia] : no insomnia [Anxiety] : no anxiety [Depression] : no depression

## 2020-07-31 NOTE — ASSESSMENT
[FreeTextEntry1] : no signs of chf on exam but has shortness of breath\par ? if related to metoprolol side effect\par decreased EF s/p biventricular PPI upgrade\par abnormal renal function\par less edema\par BP better controlled, pulsus paradoxus of 2 mm Hg today\par INR in range 2.9\par skin induration ? beginning of localized boil

## 2020-08-14 PROBLEM — R14.0 ABDOMINAL BLOATING: Status: ACTIVE | Noted: 2020-01-01

## 2020-08-14 NOTE — PLAN
[FreeTextEntry1] : check bloods\par increase sotalol to bid as in past to slow down heart rate\par will consider prednisone trial depending on response and blood results

## 2020-08-14 NOTE — ASSESSMENT
[FreeTextEntry1] : pulsus paradoxus of 4 mm HG\par some use of accessory muscles\par malaise since procedure, he was better before procedure\par using sotalol only once daily\par has converted to sinus rhythm but going too fast\par nausea and loss of appetite with benign exam\par ? some COPD involvement

## 2020-08-14 NOTE — HISTORY OF PRESENT ILLNESS
[FreeTextEntry1] : feeling weak tired nauseous decreased appetite [de-identified] : recent upgrade of PPI to biventricular pacer/defibrillator [facility] 7/24/2020\par went in to AF after sotalol changed to metoprolol\par long history of COPD and asthma\par history of coronary artery disease and past myocardial infarction in April of 2018 had a stent to a large 90% circumflex lesion with an ejection fraction at that time estimated at 30%\par echocardiogram was done with an ejection fraction of 35-40% 12/23/2019, was 25 - 30 % on echo 5/2020\par renal insufficiency has been stable\par \par depressive symptoms on duloxetine, declines to see psychologist or psychiatrist, stable\par \par sotalol\par was restarted\par \par used benadryl 2 to 3 days ago\par

## 2020-08-14 NOTE — PHYSICAL EXAM
[No Acute Distress] : no acute distress [Well-Appearing] : well-appearing [Well Developed] : well developed [Well Nourished] : well nourished [Normal Sclera/Conjunctiva] : normal sclera/conjunctiva [Normal Outer Ear/Nose] : the outer ears and nose were normal in appearance [No JVD] : no jugular venous distention [No Respiratory Distress] : no respiratory distress  [Supple] : supple [No Lymphadenopathy] : no lymphadenopathy [Clear to Auscultation] : lungs were clear to auscultation bilaterally [Normal Rate] : normal [Regular Rhythm] : with a regular rhythm [Premature Beats] : regular with premature beats [Heart Rate ___] : [unfilled] bpm [Normal S1] : normal S1 [Distant] : the heart sounds were distant [Normal S2] : normal S2 [I] : a grade 1 [No Carotid Bruits] : no carotid bruits [No Varicosities] : no varicosities [No Abdominal Bruit] : a ~M bruit was not heard ~T in the abdomen [Pedal Pulses Present] : the pedal pulses are present [No Palpable Aorta] : no palpable aorta [___ +] : bilateral [unfilled]U+ pretibial pitting edema [No Extremity Clubbing/Cyanosis] : no extremity clubbing/cyanosis [Soft] : abdomen soft [Non-distended] : non-distended [Non Tender] : non-tender [No Masses] : no abdominal mass palpated [No HSM] : no HSM [Normal Bowel Sounds] : normal bowel sounds [No CVA Tenderness] : no CVA  tenderness [Normal Posterior Cervical Nodes] : no posterior cervical lymphadenopathy [Normal Anterior Cervical Nodes] : no anterior cervical lymphadenopathy [No Joint Swelling] : no joint swelling [No Spinal Tenderness] : no spinal tenderness [No Rash] : no rash [Grossly Normal Strength/Tone] : grossly normal strength/tone [Coordination Grossly Intact] : coordination grossly intact [Normal Gait] : normal gait [No Focal Deficits] : no focal deficits [Deep Tendon Reflexes (DTR)] : deep tendon reflexes were 2+ and symmetric [Normal Insight/Judgement] : insight and judgment were intact [Normal Affect] : the affect was normal [de-identified] : air movement good [de-identified] : color good [de-identified] : ? some induration beginning uper buttock midline  pacer site looks good

## 2020-08-14 NOTE — REVIEW OF SYSTEMS
[Shortness Of Breath] : shortness of breath [Dyspnea on Exertion] : dyspnea on exertion [Nausea] : nausea [Nocturia] : nocturia [Back Pain] : back pain [Dizziness] : dizziness [Negative] : Heme/Lymph [Fever] : no fever [Hot Flashes] : no hot flashes [Chills] : no chills [Recent Change In Weight] : ~T no recent weight change [Night Sweats] : no night sweats [Postnasal Drip] : no postnasal drip [Nasal Discharge] : no nasal discharge [Chest Pain] : no chest pain [Palpitations] : no palpitations [Claudication] : no  leg claudication [Lower Ext Edema] : no lower extremity edema [Paroxysmal Nocturnal Dyspnea] : no paroxysmal nocturnal dyspnea [Orthopena] : no orthopnea [Cough] : no cough [Wheezing] : no wheezing [Vomiting] : no vomiting [Diarrhea] : no diarrhea [Constipation] : no constipation [Abdominal Pain] : no abdominal pain [Heartburn] : no heartburn [Melena] : no melena [Hesitancy] : no hesitancy [Hematuria] : no hematuria [Dysuria] : no dysuria [Incontinence] : no incontinence [Impotence] : no impotency [Frequency] : no frequency [Poor Libido] : libido not poor [Itching] : no itching [Mole Changes] : no mole changes [Joint Stiffness] : no joint stiffness [Nail Changes] : no nail changes [Hair Changes] : no hair changes [Skin Rash] : no skin rash [Confusion] : no confusion [Fainting] : no fainting [Headache] : no headache [Memory Loss] : no memory loss [Suicidal] : not suicidal [Insomnia] : no insomnia [Anxiety] : no anxiety [Depression] : no depression

## 2020-08-21 NOTE — PLAN
[FreeTextEntry1] : hold warfarin 2 days then start whole alternating with half\par reduce hydralazine to 25 bid\par rv 5 days\par ? pulmonary evaluation and repeat echo\par

## 2020-08-21 NOTE — REVIEW OF SYSTEMS
[Shortness Of Breath] : shortness of breath [Nausea] : nausea [Dyspnea on Exertion] : dyspnea on exertion [Nocturia] : nocturia [Back Pain] : back pain [Dizziness] : dizziness [Negative] : Heme/Lymph [Fever] : no fever [Chills] : no chills [Hot Flashes] : no hot flashes [Night Sweats] : no night sweats [Recent Change In Weight] : ~T no recent weight change [Postnasal Drip] : no postnasal drip [Nasal Discharge] : no nasal discharge [Claudication] : no  leg claudication [Chest Pain] : no chest pain [Palpitations] : no palpitations [Lower Ext Edema] : no lower extremity edema [Orthopena] : no orthopnea [Paroxysmal Nocturnal Dyspnea] : no paroxysmal nocturnal dyspnea [Cough] : no cough [Wheezing] : no wheezing [Abdominal Pain] : no abdominal pain [Constipation] : no constipation [Diarrhea] : no diarrhea [Melena] : no melena [Heartburn] : no heartburn [Vomiting] : no vomiting [Incontinence] : no incontinence [Dysuria] : no dysuria [Hematuria] : no hematuria [Hesitancy] : no hesitancy [Frequency] : no frequency [Impotence] : no impotency [Poor Libido] : libido not poor [Joint Stiffness] : no joint stiffness [Itching] : no itching [Mole Changes] : no mole changes [Nail Changes] : no nail changes [Hair Changes] : no hair changes [Skin Rash] : no skin rash [Headache] : no headache [Confusion] : no confusion [Fainting] : no fainting [Memory Loss] : no memory loss [Suicidal] : not suicidal [Insomnia] : no insomnia [Anxiety] : no anxiety [Depression] : no depression

## 2020-08-21 NOTE — ASSESSMENT
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

## 2020-08-21 NOTE — PHYSICAL EXAM
[No Acute Distress] : no acute distress [Well Nourished] : well nourished [Well Developed] : well developed [Well-Appearing] : well-appearing [Normal Sclera/Conjunctiva] : normal sclera/conjunctiva [Normal Outer Ear/Nose] : the outer ears and nose were normal in appearance [No JVD] : no jugular venous distention [No Lymphadenopathy] : no lymphadenopathy [Supple] : supple [No Respiratory Distress] : no respiratory distress  [Clear to Auscultation] : lungs were clear to auscultation bilaterally [Regular Rhythm] : with a regular rhythm [Normal Rate] : normal [Heart Rate ___] : [unfilled] bpm [Premature Beats] : regular with premature beats [Normal S1] : normal S1 [Normal S2] : normal S2 [Distant] : the heart sounds were distant [I] : a grade 1 [No Carotid Bruits] : no carotid bruits [No Abdominal Bruit] : a ~M bruit was not heard ~T in the abdomen [No Varicosities] : no varicosities [Pedal Pulses Present] : the pedal pulses are present [No Palpable Aorta] : no palpable aorta [No Extremity Clubbing/Cyanosis] : no extremity clubbing/cyanosis [___ +] : bilateral [unfilled]U+ pretibial pitting edema [Soft] : abdomen soft [Non Tender] : non-tender [Non-distended] : non-distended [No Masses] : no abdominal mass palpated [No HSM] : no HSM [Normal Bowel Sounds] : normal bowel sounds [Normal Posterior Cervical Nodes] : no posterior cervical lymphadenopathy [Normal Anterior Cervical Nodes] : no anterior cervical lymphadenopathy [No CVA Tenderness] : no CVA  tenderness [No Spinal Tenderness] : no spinal tenderness [No Joint Swelling] : no joint swelling [Grossly Normal Strength/Tone] : grossly normal strength/tone [No Rash] : no rash [Coordination Grossly Intact] : coordination grossly intact [No Focal Deficits] : no focal deficits [Normal Gait] : normal gait [Deep Tendon Reflexes (DTR)] : deep tendon reflexes were 2+ and symmetric [Normal Affect] : the affect was normal [Normal Insight/Judgement] : insight and judgment were intact [de-identified] : color good [de-identified] : air movement good [de-identified] : ? some induration beginning uper buttock midline  pacer site looks good

## 2020-08-21 NOTE — HISTORY OF PRESENT ILLNESS
[FreeTextEntry1] : shortness of breath\par some phlegm [de-identified] : recent upgrade of PPI to biventricular pacer/defibrillator [facility] 7/24/2020, follow up CXR 8/1/2020 was ok\par went in to AF after sotalol changed to metoprolol\par long history of COPD and asthma\par history of coronary artery disease and past myocardial infarction in April of 2018 had a stent to a large 90% circumflex lesion with an ejection fraction at that time estimated at 30%\par echocardiogram was done with an ejection fraction of 35-40% 12/23/2019, was 25 - 30 % on echo 5/2020\par renal insufficiency has been stable\par depressive symptoms on duloxetine\par sotalol was restarted initially one a day then increased to 2 a day\par took some steroids, some improvement in breathing\par a bit more edema\par some dizziness ? from higher hydralazine\par appetite is now normal\par INR 4.8 on warfarin\par \par

## 2020-08-26 NOTE — ASSESSMENT
[FreeTextEntry1] : mild COPD on exam\par INR just low\par appetite is now fully back to normal\par in regular rhythm today\par depression may be contributing to his feeling porly\par no chf on exam

## 2020-08-26 NOTE — HISTORY OF PRESENT ILLNESS
[FreeTextEntry1] : here to follow ongoing conditions [de-identified] : recent upgrade of PPI to biventricular pacer/defibrillator [facility] 7/24/2020, follow up CXR 8/1/2020 was ok\par went in to AF after sotalol changed to metoprolol\par long history of COPD and asthma\par history of coronary artery disease and past myocardial infarction in April of 2018 had a stent to a large 90% circumflex lesion with an ejection fraction at that time estimated at 30%\par echocardiogram was done with an ejection fraction of 35-40% 12/23/2019, was 25 - 30 % on echo 5/2020\par renal insufficiency has been stable\par depressive symptoms on duloxetine\par sotalol was restarted initially one a day then increased to 2 a day\par appetite is now normal\par INR  on warfarin down to 1.9\par appetite better\par \par

## 2020-08-26 NOTE — PLAN
[FreeTextEntry1] : taper duloxitene\par ? add a different agent\par increase warfarin\par RV 2 weeks\par he absolutely refuses to see a psychiatrist

## 2020-08-26 NOTE — REVIEW OF SYSTEMS
[Dyspnea on Exertion] : dyspnea on exertion [Nausea] : nausea [Nocturia] : nocturia [Back Pain] : back pain [Negative] : Heme/Lymph [Fever] : no fever [Chills] : no chills [Hot Flashes] : no hot flashes [Night Sweats] : no night sweats [Recent Change In Weight] : ~T no recent weight change [Postnasal Drip] : no postnasal drip [Nasal Discharge] : no nasal discharge [Chest Pain] : no chest pain [Palpitations] : no palpitations [Claudication] : no  leg claudication [Orthopena] : no orthopnea [Lower Ext Edema] : no lower extremity edema [Wheezing] : no wheezing [Paroxysmal Nocturnal Dyspnea] : no paroxysmal nocturnal dyspnea [Shortness Of Breath] : no shortness of breath [Cough] : no cough [Constipation] : no constipation [Abdominal Pain] : no abdominal pain [Vomiting] : no vomiting [Diarrhea] : no diarrhea [Heartburn] : no heartburn [Melena] : no melena [Dysuria] : no dysuria [Incontinence] : no incontinence [Hesitancy] : no hesitancy [Frequency] : no frequency [Hematuria] : no hematuria [Poor Libido] : libido not poor [Impotence] : no impotency [Joint Stiffness] : no joint stiffness [Itching] : no itching [Mole Changes] : no mole changes [Hair Changes] : no hair changes [Skin Rash] : no skin rash [Nail Changes] : no nail changes [Dizziness] : no dizziness [Headache] : no headache [Confusion] : no confusion [Fainting] : no fainting [Suicidal] : not suicidal [Memory Loss] : no memory loss [Insomnia] : no insomnia [Depression] : no depression [Anxiety] : no anxiety

## 2020-08-26 NOTE — PHYSICAL EXAM
[No Acute Distress] : no acute distress [Well Developed] : well developed [Well Nourished] : well nourished [Well-Appearing] : well-appearing [Normal Outer Ear/Nose] : the outer ears and nose were normal in appearance [Normal Sclera/Conjunctiva] : normal sclera/conjunctiva [No JVD] : no jugular venous distention [Supple] : supple [No Lymphadenopathy] : no lymphadenopathy [No Respiratory Distress] : no respiratory distress  [Clear to Auscultation] : lungs were clear to auscultation bilaterally [Normal Rate] : normal [Heart Rate ___] : [unfilled] bpm [Regular Rhythm] : with a regular rhythm [Premature Beats] : regular with premature beats [Normal S1] : normal S1 [Distant] : the heart sounds were distant [Normal S2] : normal S2 [I] : a grade 1 [No Carotid Bruits] : no carotid bruits [No Abdominal Bruit] : a ~M bruit was not heard ~T in the abdomen [No Varicosities] : no varicosities [Pedal Pulses Present] : the pedal pulses are present [No Palpable Aorta] : no palpable aorta [No Extremity Clubbing/Cyanosis] : no extremity clubbing/cyanosis [___ +] : bilateral [unfilled]U+ pretibial pitting edema [Non Tender] : non-tender [Soft] : abdomen soft [Non-distended] : non-distended [Normal Bowel Sounds] : normal bowel sounds [No HSM] : no HSM [No Masses] : no abdominal mass palpated [Normal Anterior Cervical Nodes] : no anterior cervical lymphadenopathy [Normal Posterior Cervical Nodes] : no posterior cervical lymphadenopathy [No Spinal Tenderness] : no spinal tenderness [No CVA Tenderness] : no CVA  tenderness [No Joint Swelling] : no joint swelling [Grossly Normal Strength/Tone] : grossly normal strength/tone [No Rash] : no rash [Coordination Grossly Intact] : coordination grossly intact [No Focal Deficits] : no focal deficits [Normal Gait] : normal gait [Deep Tendon Reflexes (DTR)] : deep tendon reflexes were 2+ and symmetric [Normal Insight/Judgement] : insight and judgment were intact [Normal Affect] : the affect was normal [de-identified] : color good [de-identified] : air movement good [de-identified] : ? some induration beginning uper buttock midline  pacer site looks good

## 2020-09-10 PROBLEM — M48.062 SPINAL STENOSIS OF LUMBAR REGION WITH NEUROGENIC CLAUDICATION: Status: ACTIVE | Noted: 2019-08-29

## 2020-09-10 NOTE — ASSESSMENT
[FreeTextEntry1] : Previous hospitalization for exacerbation of congestive heart failure\par Patient has not done well since his biventricular pacemaker/defibrillator upgrade\par He seems to be at a very good dry weight now and would benefit from following up with a kidney specialist\par He is doing daily weights and his scale is in line with our weight measurement here\par He has minimal edema\par His pressure is too high and we will increase the hydralazine back to 50 twice a day and recheck him next week\par I have explained the importance of the daily weights and if we see a drifting up we will increase his torsemide accordingly\par He did have several hours of worsening of his breathing before he decided to go to the hospital so in the future we can use the torsemide with extra doses to prevent this from happening again\par

## 2020-09-10 NOTE — PHYSICAL EXAM
[No Acute Distress] : no acute distress [Well Nourished] : well nourished [Well Developed] : well developed [Normal Sclera/Conjunctiva] : normal sclera/conjunctiva [Well-Appearing] : well-appearing [Normal Outer Ear/Nose] : the outer ears and nose were normal in appearance [No Lymphadenopathy] : no lymphadenopathy [No JVD] : no jugular venous distention [Supple] : supple [Clear to Auscultation] : lungs were clear to auscultation bilaterally [No Respiratory Distress] : no respiratory distress  [Normal Rate] : normal [Heart Rate ___] : [unfilled] bpm [Regular Rhythm] : with a regular rhythm [Normal S1] : normal S1 [Premature Beats] : regular with premature beats [Normal S2] : normal S2 [Distant] : the heart sounds were distant [I] : a grade 1 [No Carotid Bruits] : no carotid bruits [No Abdominal Bruit] : a ~M bruit was not heard ~T in the abdomen [No Varicosities] : no varicosities [Pedal Pulses Present] : the pedal pulses are present [No Palpable Aorta] : no palpable aorta [No Extremity Clubbing/Cyanosis] : no extremity clubbing/cyanosis [Soft] : abdomen soft [___ +] : bilateral [unfilled]U+ pretibial pitting edema [Non Tender] : non-tender [Non-distended] : non-distended [No HSM] : no HSM [No Masses] : no abdominal mass palpated [Normal Bowel Sounds] : normal bowel sounds [Normal Anterior Cervical Nodes] : no anterior cervical lymphadenopathy [Normal Posterior Cervical Nodes] : no posterior cervical lymphadenopathy [No CVA Tenderness] : no CVA  tenderness [No Spinal Tenderness] : no spinal tenderness [Grossly Normal Strength/Tone] : grossly normal strength/tone [No Joint Swelling] : no joint swelling [No Focal Deficits] : no focal deficits [No Rash] : no rash [Coordination Grossly Intact] : coordination grossly intact [Deep Tendon Reflexes (DTR)] : deep tendon reflexes were 2+ and symmetric [Normal Gait] : normal gait [Normal Insight/Judgement] : insight and judgment were intact [Normal Affect] : the affect was normal [de-identified] : color good [de-identified] : ? some induration beginning uper buttock midline  pacer site looks good

## 2020-09-10 NOTE — PLAN
[FreeTextEntry1] : Increase hydralazine to 50 twice a day\par Keep the torsemide at 10 twice a day but we will be ready to adjusted upward should his weight change at all\par We will check the kidney function today and he will follow up with the kidney specialist

## 2020-09-10 NOTE — HISTORY OF PRESENT ILLNESS
[Admitted on: ___] : The patient was admitted on [unfilled] [Discharged on ___] : discharged on [unfilled] [FreeTextEntry2] : exacerbation of CHF\par responded nicely to diuretics\par seen by renal \par slight adjustment of meds to torsemide 1.5 10 mg pills bid, but he is only taking 10 bid\par on discharge instructions hydralazine was 50 bid, he over the phone told me 25 bid\par INR on warfarin for his paroxysmal atrial fibrillation is perfect today

## 2020-09-10 NOTE — REVIEW OF SYSTEMS
[Nocturia] : nocturia [Back Pain] : back pain [Negative] : Heme/Lymph [Fever] : no fever [Night Sweats] : no night sweats [Chills] : no chills [Hot Flashes] : no hot flashes [Recent Change In Weight] : ~T no recent weight change [Nasal Discharge] : no nasal discharge [Postnasal Drip] : no postnasal drip [Claudication] : no  leg claudication [Chest Pain] : no chest pain [Palpitations] : no palpitations [Orthopena] : no orthopnea [Lower Ext Edema] : no lower extremity edema [Paroxysmal Nocturnal Dyspnea] : no paroxysmal nocturnal dyspnea [Shortness Of Breath] : no shortness of breath [Wheezing] : no wheezing [Cough] : no cough [Dyspnea on Exertion] : not dyspnea on exertion [Abdominal Pain] : no abdominal pain [Nausea] : no nausea [Diarrhea] : no diarrhea [Constipation] : no constipation [Vomiting] : no vomiting [Heartburn] : no heartburn [Hesitancy] : no hesitancy [Incontinence] : no incontinence [Melena] : no melena [Dysuria] : no dysuria [Hematuria] : no hematuria [Frequency] : no frequency [Joint Stiffness] : no joint stiffness [Poor Libido] : libido not poor [Impotence] : no impotency [Mole Changes] : no mole changes [Itching] : no itching [Nail Changes] : no nail changes [Headache] : no headache [Hair Changes] : no hair changes [Skin Rash] : no skin rash [Fainting] : no fainting [Dizziness] : no dizziness [Suicidal] : not suicidal [Memory Loss] : no memory loss [Confusion] : no confusion [Anxiety] : no anxiety [Insomnia] : no insomnia [Depression] : no depression

## 2020-09-16 NOTE — PHYSICAL EXAM
[No Acute Distress] : no acute distress [Well-Appearing] : well-appearing [Well Nourished] : well nourished [Well Developed] : well developed [Normal Outer Ear/Nose] : the outer ears and nose were normal in appearance [Normal Sclera/Conjunctiva] : normal sclera/conjunctiva [No JVD] : no jugular venous distention [No Lymphadenopathy] : no lymphadenopathy [Supple] : supple [No Respiratory Distress] : no respiratory distress  [Clear to Auscultation] : lungs were clear to auscultation bilaterally [Regular Rhythm] : with a regular rhythm [Normal Rate] : normal [Heart Rate ___] : [unfilled] bpm [Normal S1] : normal S1 [Normal S2] : normal S2 [Distant] : the heart sounds were distant [I] : a grade 1 [No Carotid Bruits] : no carotid bruits [Pedal Pulses Present] : the pedal pulses are present [No Abdominal Bruit] : a ~M bruit was not heard ~T in the abdomen [No Varicosities] : no varicosities [No Palpable Aorta] : no palpable aorta [No Extremity Clubbing/Cyanosis] : no extremity clubbing/cyanosis [___ +] : bilateral [unfilled]U+ pretibial pitting edema [Soft] : abdomen soft [Non Tender] : non-tender [Non-distended] : non-distended [No Masses] : no abdominal mass palpated [No HSM] : no HSM [Normal Posterior Cervical Nodes] : no posterior cervical lymphadenopathy [Normal Bowel Sounds] : normal bowel sounds [Normal Anterior Cervical Nodes] : no anterior cervical lymphadenopathy [No CVA Tenderness] : no CVA  tenderness [No Spinal Tenderness] : no spinal tenderness [No Joint Swelling] : no joint swelling [Grossly Normal Strength/Tone] : grossly normal strength/tone [No Rash] : no rash [Coordination Grossly Intact] : coordination grossly intact [No Focal Deficits] : no focal deficits [Deep Tendon Reflexes (DTR)] : deep tendon reflexes were 2+ and symmetric [Normal Gait] : normal gait [Normal Insight/Judgement] : insight and judgment were intact [Normal Affect] : the affect was normal [No Edema] : there was no peripheral edema [Rhythm Regular] : regular [de-identified] : color good [de-identified] : air movement good [de-identified] : ? some induration beginning uper buttock midline  pacer site looks good

## 2020-09-16 NOTE — REVIEW OF SYSTEMS
[Dyspnea on Exertion] : dyspnea on exertion [Nausea] : nausea [Nocturia] : nocturia [Back Pain] : back pain [Negative] : Heme/Lymph [Fever] : no fever [Chills] : no chills [Hot Flashes] : no hot flashes [Night Sweats] : no night sweats [Recent Change In Weight] : ~T no recent weight change [Postnasal Drip] : no postnasal drip [Nasal Discharge] : no nasal discharge [Chest Pain] : no chest pain [Palpitations] : no palpitations [Claudication] : no  leg claudication [Lower Ext Edema] : no lower extremity edema [Orthopena] : no orthopnea [Paroxysmal Nocturnal Dyspnea] : no paroxysmal nocturnal dyspnea [Shortness Of Breath] : no shortness of breath [Wheezing] : no wheezing [Cough] : no cough [Abdominal Pain] : no abdominal pain [Constipation] : no constipation [Diarrhea] : no diarrhea [Vomiting] : no vomiting [Heartburn] : no heartburn [Melena] : no melena [Dysuria] : no dysuria [Incontinence] : no incontinence [Hesitancy] : no hesitancy [Hematuria] : no hematuria [Frequency] : no frequency [Impotence] : no impotency [Poor Libido] : libido not poor [Joint Stiffness] : no joint stiffness [Itching] : no itching [Mole Changes] : no mole changes [Nail Changes] : no nail changes [Hair Changes] : no hair changes [Skin Rash] : no skin rash [Headache] : no headache [Dizziness] : no dizziness [Fainting] : no fainting [Confusion] : no confusion [Memory Loss] : no memory loss [Suicidal] : not suicidal [Insomnia] : no insomnia [Anxiety] : no anxiety [Depression] : no depression

## 2020-09-16 NOTE — HISTORY OF PRESENT ILLNESS
[FreeTextEntry1] : here to follow ongoing conditions [de-identified] : recent upgrade of PPI to biventricular pacer/defibrillator [facility] 7/24/2020, follow up CXR 8/1/2020 was ok\par went in to AF after sotalol changed to metoprolol\par hospitalized 9/2 to 9/4/2020 for CHF exacerbation\par still feels awful since biventricular PPI/defibrillator upgrade\par has been more sedentary\par long history of COPD and asthma\par history of coronary artery disease and past myocardial infarction in April of 2018 had a stent to a large 90% circumflex lesion with an ejection fraction at that time estimated at 30%\par echocardiogram was done with an ejection fraction of 35-40% 12/23/2019, was 25 - 30 % on echo 5/2020\par renal insufficiency has been stable\par depressive symptoms on duloxetine\par INR  on warfarin 2.7 today\par was in the ER 9/11/2020 after falling and being unable to get up, felt dizzy and nauseous and couldn't get up, felt to be a bit dehydrated\par evaluation negative, did have CT head\par I ambulated patient in hallway, O2 sat 97 to 98 %, did not desaturate with activity\par \par

## 2020-09-16 NOTE — PLAN
[FreeTextEntry1] : try to increase activities\par same warfarin RV 1 week, check bloods then as well

## 2020-09-16 NOTE — ASSESSMENT
[FreeTextEntry1] : Previous hospitalization for exacerbation of congestive heart failure and ER evaluation with mild dehydration\par Patient has not done well since his biventricular pacemaker/defibrillator upgrade\par no edema\par His pressure is perfect, INR good\par likely there is some deconditioning

## 2020-09-25 PROBLEM — R63.4 UNEXPLAINED WEIGHT LOSS: Status: ACTIVE | Noted: 2019-04-03

## 2020-09-25 NOTE — HISTORY OF PRESENT ILLNESS
[FreeTextEntry1] : here to follow ongoing conditions [de-identified] : recent upgrade of PPI to biventricular pacer/defibrillator [facility] 7/24/2020, follow up CXR 8/1/2020 was ok\par went in to AF after sotalol changed to metoprolol\par hospitalized 9/2 to 9/4/2020 for CHF exacerbation\par felt worse after biventricular PPI/defibrillator upgrade, not as bad this past week\par has been more sedentary\par long history of COPD and asthma\par history of coronary artery disease and past myocardial infarction in April of 2018 had a stent to a large 90% circumflex lesion with an ejection fraction at that time estimated at 30%\par echocardiogram was done with an ejection fraction of 35-40% 12/23/2019, was 25 - 30 % on echo 5/2020\par renal insufficiency has been stable\par depressive symptoms on duloxetine which was cut from 60 to 30 a month ago\par INR  on warfarin 2.3 today\par was in the ER 9/11/2020 after falling and being unable to get up, felt dizzy and nauseous and couldn't get up, felt to be a bit dehydrated\par evaluation negative, did have CT head\par ambulated here in hallway last week, O2 sat 97 to 98 %, did not desaturate with activity\par \par

## 2020-09-25 NOTE — PLAN
[FreeTextEntry1] : Complex medication interactions however he might be a little better on the lower duloxetine and is very concerned about insomnia\par At this time we may try to stop his duloxetine and put him on trazodone with the understanding that if he worsens in anyway we'll switch right back and that he should not take both medications\par We will follow the protime next week to make sure there are no different interactions\par We will continue his other medications unchanged

## 2020-09-25 NOTE — ASSESSMENT
[FreeTextEntry1] : recent biventricular pacemaker/defibrillator upgrade just looks better today\par tolerated reducing duloxetine\par no edema\par INR good\par systolic chf well compensated now\par COPD stable

## 2020-09-25 NOTE — REVIEW OF SYSTEMS
[Nausea] : nausea [Nocturia] : nocturia [Back Pain] : back pain [Insomnia] : insomnia [Depression] : depression [Negative] : Heme/Lymph [Fever] : no fever [Chills] : no chills [Hot Flashes] : no hot flashes [Night Sweats] : no night sweats [Recent Change In Weight] : ~T no recent weight change [Postnasal Drip] : no postnasal drip [Nasal Discharge] : no nasal discharge [Chest Pain] : no chest pain [Palpitations] : no palpitations [Claudication] : no  leg claudication [Lower Ext Edema] : no lower extremity edema [Orthopena] : no orthopnea [Paroxysmal Nocturnal Dyspnea] : no paroxysmal nocturnal dyspnea [Shortness Of Breath] : no shortness of breath [Wheezing] : no wheezing [Cough] : no cough [Dyspnea on Exertion] : not dyspnea on exertion [Abdominal Pain] : no abdominal pain [Constipation] : no constipation [Diarrhea] : no diarrhea [Vomiting] : no vomiting [Heartburn] : no heartburn [Melena] : no melena [Dysuria] : no dysuria [Incontinence] : no incontinence [Hesitancy] : no hesitancy [Hematuria] : no hematuria [Frequency] : no frequency [Impotence] : no impotency [Poor Libido] : libido not poor [Joint Stiffness] : no joint stiffness [Itching] : no itching [Mole Changes] : no mole changes [Nail Changes] : no nail changes [Hair Changes] : no hair changes [Skin Rash] : no skin rash [Headache] : no headache [Dizziness] : no dizziness [Fainting] : no fainting [Confusion] : no confusion [Memory Loss] : no memory loss [Suicidal] : not suicidal [Anxiety] : no anxiety [FreeTextEntry6] : not as bad

## 2020-09-25 NOTE — PHYSICAL EXAM
[No Acute Distress] : no acute distress [Well Nourished] : well nourished [Well Developed] : well developed [Well-Appearing] : well-appearing [Normal Sclera/Conjunctiva] : normal sclera/conjunctiva [Normal Outer Ear/Nose] : the outer ears and nose were normal in appearance [No JVD] : no jugular venous distention [No Lymphadenopathy] : no lymphadenopathy [Supple] : supple [No Respiratory Distress] : no respiratory distress  [Clear to Auscultation] : lungs were clear to auscultation bilaterally [Normal Rate] : normal [Heart Rate ___] : [unfilled] bpm [Premature Beats] : regular with premature beats [Normal S1] : normal S1 [Normal S2] : normal S2 [Distant] : the heart sounds were distant [I] : a grade 1 [No Carotid Bruits] : no carotid bruits [No Abdominal Bruit] : a ~M bruit was not heard ~T in the abdomen [No Varicosities] : no varicosities [Pedal Pulses Present] : the pedal pulses are present [No Edema] : there was no peripheral edema [No Palpable Aorta] : no palpable aorta [No Extremity Clubbing/Cyanosis] : no extremity clubbing/cyanosis [Soft] : abdomen soft [Non Tender] : non-tender [Non-distended] : non-distended [No Masses] : no abdominal mass palpated [No HSM] : no HSM [Normal Bowel Sounds] : normal bowel sounds [Normal Posterior Cervical Nodes] : no posterior cervical lymphadenopathy [Normal Anterior Cervical Nodes] : no anterior cervical lymphadenopathy [No CVA Tenderness] : no CVA  tenderness [No Spinal Tenderness] : no spinal tenderness [No Joint Swelling] : no joint swelling [Grossly Normal Strength/Tone] : grossly normal strength/tone [No Rash] : no rash [Coordination Grossly Intact] : coordination grossly intact [No Focal Deficits] : no focal deficits [Normal Gait] : normal gait [Deep Tendon Reflexes (DTR)] : deep tendon reflexes were 2+ and symmetric [Speech Grossly Normal] : speech grossly normal [Memory Grossly Normal] : memory grossly normal [Normal Affect] : the affect was normal [Alert and Oriented x3] : oriented to person, place, and time [Normal Insight/Judgement] : insight and judgment were intact [de-identified] : color good [de-identified] : air movement good [de-identified] :   pacer site looks good

## 2020-10-02 PROBLEM — Z23 ENCOUNTER FOR IMMUNIZATION: Status: ACTIVE | Noted: 2020-01-01

## 2020-10-02 NOTE — PLAN
[FreeTextEntry1] : Patient states his diet hasn't changed he is on no new supplements or medicines and cause of his elevation in INR is not clear\par He feels better overall likely from the prednisone\par No CHF on exam today\par We'll check bloods make sure his liver is okay\par We will try lowering the duloxetine to 20 mg a day\par Return visit one week on lowered warfarin schedule written out for patient to hold today and then a three-day schedule of half whole whole, half whole whole

## 2020-10-02 NOTE — ASSESSMENT
[FreeTextEntry1] : recent biventricular pacemaker/defibrillator upgrade no clinical improvement from it according to patient\par tolerated reducing duloxetine, did not tolerate change to trazodone\par INR too high\par systolic chf well compensated now\par COPD stable

## 2020-10-02 NOTE — PHYSICAL EXAM
[No Acute Distress] : no acute distress [Well Nourished] : well nourished [Well Developed] : well developed [Well-Appearing] : well-appearing [Normal Sclera/Conjunctiva] : normal sclera/conjunctiva [Normal Outer Ear/Nose] : the outer ears and nose were normal in appearance [No JVD] : no jugular venous distention [No Lymphadenopathy] : no lymphadenopathy [Supple] : supple [No Respiratory Distress] : no respiratory distress  [Clear to Auscultation] : lungs were clear to auscultation bilaterally [Normal Rate] : normal [Heart Rate ___] : [unfilled] bpm [Premature Beats] : regular with premature beats [Normal S1] : normal S1 [Normal S2] : normal S2 [Distant] : the heart sounds were distant [I] : a grade 1 [No Carotid Bruits] : no carotid bruits [No Abdominal Bruit] : a ~M bruit was not heard ~T in the abdomen [No Varicosities] : no varicosities [Pedal Pulses Present] : the pedal pulses are present [No Edema] : there was no peripheral edema [No Palpable Aorta] : no palpable aorta [No Extremity Clubbing/Cyanosis] : no extremity clubbing/cyanosis [Soft] : abdomen soft [Non Tender] : non-tender [Non-distended] : non-distended [No Masses] : no abdominal mass palpated [No HSM] : no HSM [Normal Bowel Sounds] : normal bowel sounds [Normal Posterior Cervical Nodes] : no posterior cervical lymphadenopathy [Normal Anterior Cervical Nodes] : no anterior cervical lymphadenopathy [No CVA Tenderness] : no CVA  tenderness [No Spinal Tenderness] : no spinal tenderness [No Joint Swelling] : no joint swelling [Grossly Normal Strength/Tone] : grossly normal strength/tone [No Rash] : no rash [Coordination Grossly Intact] : coordination grossly intact [No Focal Deficits] : no focal deficits [Normal Gait] : normal gait [Deep Tendon Reflexes (DTR)] : deep tendon reflexes were 2+ and symmetric [Speech Grossly Normal] : speech grossly normal [Memory Grossly Normal] : memory grossly normal [Normal Affect] : the affect was normal [Alert and Oriented x3] : oriented to person, place, and time [Normal Insight/Judgement] : insight and judgment were intact [de-identified] : color good [de-identified] : air movement good [de-identified] :   pacer site looks good

## 2020-10-02 NOTE — HISTORY OF PRESENT ILLNESS
[FreeTextEntry1] : here to follow ongoing conditions [de-identified] : recent upgrade of PPI to biventricular pacer/defibrillator [facility] 7/24/2020, follow up CXR 8/1/2020 was ok\par went in to AF after sotalol changed to metoprolol\par hospitalized 9/2 to 9/4/2020 for CHF exacerbation\par felt worse after biventricular PPI/defibrillator upgrade, not as bad when seen last week\par long history of COPD and asthma\par history of coronary artery disease and past myocardial infarction in April of 2018 had a stent to a large 90% circumflex lesion with an ejection fraction at that time estimated at 30%\par echocardiogram was done with an ejection fraction of 35-40% 12/23/2019, was 25 - 30 % on echo 5/2020\par renal insufficiency has been stable\par depressive symptoms on duloxetine which was cut from 60 to 30 \par INR  on warfarin  today 4.0\par was in the ER 9/11/2020 after falling and being unable to get up, felt dizzy and nauseous and couldn't get up, felt to be a bit dehydrated\par evaluation negative, did have CT head\par did not tolerate trazodone trial for insomnia last week\par on prednisone for itching, took 40 qd down to 10 qd, much better\par feels better overall

## 2020-10-09 PROBLEM — R06.02 SHORTNESS OF BREATH: Status: ACTIVE | Noted: 2020-02-24

## 2020-10-09 NOTE — ASSESSMENT
[FreeTextEntry1] : recent biventricular pacemaker/defibrillator upgrade no clinical improvement from it \par tolerated reduced duloxetine plan to taper to off\par INR perfect\par systolic chf well compensated now\par COPD stable\par better on prednisone

## 2020-10-09 NOTE — HISTORY OF PRESENT ILLNESS
[FreeTextEntry1] : here to follow ongoing conditions [de-identified] : upgrade of PPI to biventricular pacer/defibrillator [facility] 7/24/2020, follow up CXR 8/1/2020 was ok, pt hasn’t felt well since then\par went in to AF after sotalol changed to metoprolol\par hospitalized 9/2 to 9/4/2020 for CHF exacerbation\par long history of COPD and asthma\par history of coronary artery disease and past myocardial infarction in April of 2018 had a stent to a large 90% circumflex lesion with an ejection fraction at that time estimated at 30%\par echocardiogram was done with an ejection fraction of 35-40% 12/23/2019, was 25 - 30 % on echo 5/2020\par renal insufficiency has been stable\par depressive symptoms on duloxetine which was cut from 60 to 30 \par INR on warfarin  today 2.7\par was in the ER 9/11/2020 after falling and being unable to get up, felt dizzy and nauseous and couldn't get up, felt to be a bit dehydrated\par evaluation negative, negative CT head\par did not tolerate trazodone trial for insomnia, duloxetine lowered from 60 to 30 and then last week to 20 mg\par on prednisone for itching now on 10 a day doing much better\par feels better overall\par HTN well controlled on only 25 bid hydralazine

## 2020-10-09 NOTE — PHYSICAL EXAM
[No Acute Distress] : no acute distress [Well Nourished] : well nourished [Well Developed] : well developed [Well-Appearing] : well-appearing [Normal Sclera/Conjunctiva] : normal sclera/conjunctiva [Normal Outer Ear/Nose] : the outer ears and nose were normal in appearance [No JVD] : no jugular venous distention [No Lymphadenopathy] : no lymphadenopathy [Supple] : supple [No Respiratory Distress] : no respiratory distress  [Clear to Auscultation] : lungs were clear to auscultation bilaterally [Normal Rate] : normal [Heart Rate ___] : [unfilled] bpm [Premature Beats] : regular with premature beats [Normal S1] : normal S1 [Normal S2] : normal S2 [Distant] : the heart sounds were distant [I] : a grade 1 [No Carotid Bruits] : no carotid bruits [No Abdominal Bruit] : a ~M bruit was not heard ~T in the abdomen [No Varicosities] : no varicosities [Pedal Pulses Present] : the pedal pulses are present [No Edema] : there was no peripheral edema [No Palpable Aorta] : no palpable aorta [No Extremity Clubbing/Cyanosis] : no extremity clubbing/cyanosis [Soft] : abdomen soft [Non Tender] : non-tender [Non-distended] : non-distended [No Masses] : no abdominal mass palpated [No HSM] : no HSM [Normal Bowel Sounds] : normal bowel sounds [Normal Posterior Cervical Nodes] : no posterior cervical lymphadenopathy [Normal Anterior Cervical Nodes] : no anterior cervical lymphadenopathy [No CVA Tenderness] : no CVA  tenderness [No Spinal Tenderness] : no spinal tenderness [No Joint Swelling] : no joint swelling [Grossly Normal Strength/Tone] : grossly normal strength/tone [No Rash] : no rash [Coordination Grossly Intact] : coordination grossly intact [No Focal Deficits] : no focal deficits [Normal Gait] : normal gait [Deep Tendon Reflexes (DTR)] : deep tendon reflexes were 2+ and symmetric [Speech Grossly Normal] : speech grossly normal [Memory Grossly Normal] : memory grossly normal [Normal Affect] : the affect was normal [Alert and Oriented x3] : oriented to person, place, and time [Normal Insight/Judgement] : insight and judgment were intact [de-identified] : color good [de-identified] : air movement good [de-identified] :   pacer site looks good

## 2020-10-23 PROBLEM — N40.0 BPH (BENIGN PROSTATIC HYPERPLASIA): Status: ACTIVE | Noted: 2018-11-21

## 2020-10-23 PROBLEM — I25.10 ARTERIOSCLEROSIS OF CORONARY ARTERY: Status: ACTIVE | Noted: 2018-11-21

## 2020-10-23 NOTE — ASSESSMENT
[FreeTextEntry1] : recent biventricular pacemaker/defibrillator upgrade no clinical improvement \par tolerating reduced duloxetine plan to taper to off\par INR perfect\par systolic chf well compensated \par COPD stable but using nebulizers, declines to f/u with pulmonary\par itching better on prednisone

## 2020-10-23 NOTE — PHYSICAL EXAM
[No Acute Distress] : no acute distress [Well Nourished] : well nourished [Well Developed] : well developed [Well-Appearing] : well-appearing [Normal Sclera/Conjunctiva] : normal sclera/conjunctiva [Normal Outer Ear/Nose] : the outer ears and nose were normal in appearance [No JVD] : no jugular venous distention [No Lymphadenopathy] : no lymphadenopathy [Supple] : supple [No Respiratory Distress] : no respiratory distress  [Clear to Auscultation] : lungs were clear to auscultation bilaterally [Normal Rate] : normal [Heart Rate ___] : [unfilled] bpm [Premature Beats] : regular with premature beats [Normal S1] : normal S1 [Normal S2] : normal S2 [Distant] : the heart sounds were distant [I] : a grade 1 [No Carotid Bruits] : no carotid bruits [No Abdominal Bruit] : a ~M bruit was not heard ~T in the abdomen [No Varicosities] : no varicosities [Pedal Pulses Present] : the pedal pulses are present [No Edema] : there was no peripheral edema [No Palpable Aorta] : no palpable aorta [No Extremity Clubbing/Cyanosis] : no extremity clubbing/cyanosis [Soft] : abdomen soft [Non Tender] : non-tender [Non-distended] : non-distended [No Masses] : no abdominal mass palpated [No HSM] : no HSM [Normal Bowel Sounds] : normal bowel sounds [Normal Posterior Cervical Nodes] : no posterior cervical lymphadenopathy [Normal Anterior Cervical Nodes] : no anterior cervical lymphadenopathy [No CVA Tenderness] : no CVA  tenderness [No Spinal Tenderness] : no spinal tenderness [No Joint Swelling] : no joint swelling [Grossly Normal Strength/Tone] : grossly normal strength/tone [No Rash] : no rash [Coordination Grossly Intact] : coordination grossly intact [No Focal Deficits] : no focal deficits [Normal Gait] : normal gait [Deep Tendon Reflexes (DTR)] : deep tendon reflexes were 2+ and symmetric [Speech Grossly Normal] : speech grossly normal [Memory Grossly Normal] : memory grossly normal [Normal Affect] : the affect was normal [Alert and Oriented x3] : oriented to person, place, and time [Normal Insight/Judgement] : insight and judgment were intact [de-identified] : color good [de-identified] : air movement very good [de-identified] :   pacer site looks good

## 2020-10-23 NOTE — REVIEW OF SYSTEMS
[Shortness Of Breath] : shortness of breath [Nocturia] : nocturia [Back Pain] : back pain [Insomnia] : insomnia [Depression] : depression [Negative] : Heme/Lymph [Fever] : no fever [Chills] : no chills [Hot Flashes] : no hot flashes [Night Sweats] : no night sweats [Recent Change In Weight] : ~T no recent weight change [Postnasal Drip] : no postnasal drip [Nasal Discharge] : no nasal discharge [Chest Pain] : no chest pain [Palpitations] : no palpitations [Claudication] : no  leg claudication [Lower Ext Edema] : no lower extremity edema [Orthopena] : no orthopnea [Paroxysmal Nocturnal Dyspnea] : no paroxysmal nocturnal dyspnea [Wheezing] : no wheezing [Cough] : no cough [Dyspnea on Exertion] : not dyspnea on exertion [Abdominal Pain] : no abdominal pain [Nausea] : no nausea [Constipation] : no constipation [Diarrhea] : no diarrhea [Vomiting] : no vomiting [Heartburn] : no heartburn [Melena] : no melena [Dysuria] : no dysuria [Incontinence] : no incontinence [Hesitancy] : no hesitancy [Hematuria] : no hematuria [Frequency] : no frequency [Impotence] : no impotency [Poor Libido] : libido not poor [Joint Stiffness] : no joint stiffness [Itching] : no itching [Mole Changes] : no mole changes [Nail Changes] : no nail changes [Hair Changes] : no hair changes [Skin Rash] : no skin rash [Headache] : no headache [Dizziness] : no dizziness [Fainting] : no fainting [Confusion] : no confusion [Memory Loss] : no memory loss [Suicidal] : not suicidal [Anxiety] : no anxiety

## 2020-10-23 NOTE — HISTORY OF PRESENT ILLNESS
[FreeTextEntry1] : here to follow ongoing conditions [de-identified] : upgrade of PPI to biventricular pacer/defibrillator [facility] 7/24/2020, follow up CXR 8/1/2020 was ok, pt hasn’t felt well since then\par went in to AF after sotalol changed to metoprolol\par hospitalized 9/2 to 9/4/2020 for CHF exacerbation\par long history of COPD and asthma, using nebulizer\par history of coronary artery disease and past myocardial infarction in April of 2018 had a stent to a large 90% circumflex lesion with an ejection fraction at that time estimated at 30%\par echocardiogram was done with an ejection fraction of 35-40% 12/23/2019, was 25 - 30 % on echo 5/2020\par renal insufficiency has been stable\par depressive symptoms on duloxetine which was cut from 60 to 30 and then on last visit to 20 mg\par INR 2.4 on warfarin  today \par did not tolerate trazodone trial for insomnia\par still on prednisone 10 mg for itching \par feels better overall\par HTN fairly well controlled on only 25 bid hydralazine

## 2020-10-23 NOTE — PLAN
[FreeTextEntry1] : same meds except try to taper duloxetine to off\par Should any depression or anxiety develop he will immediately restart the medication and let me know\par next visit in 2 weeks we will decide whether or not to try an agent for possible depression

## 2020-11-05 NOTE — PLAN
[FreeTextEntry1] : Increase warfarin to 5 mg every day and recheck in 4 days\par Increase torsemide to 2 in the morning one in the evening for 2 days and then go back to his one twice a day dose as he has had problems with dehydration in the past\par Would not start any antidepressant until we adjust other situations better\par Consider pulmonary followup\par Reduce salt in diet\par Appointment made for 4 days from now

## 2020-11-05 NOTE — PHYSICAL EXAM
[No Acute Distress] : no acute distress [Well Nourished] : well nourished [Well Developed] : well developed [Well-Appearing] : well-appearing [Normal Sclera/Conjunctiva] : normal sclera/conjunctiva [Normal Outer Ear/Nose] : the outer ears and nose were normal in appearance [No JVD] : no jugular venous distention [No Lymphadenopathy] : no lymphadenopathy [Supple] : supple [No Respiratory Distress] : no respiratory distress  [Clear to Auscultation] : lungs were clear to auscultation bilaterally [Normal Rate] : normal [Heart Rate ___] : [unfilled] bpm [Premature Beats] : regular with premature beats [Normal S1] : normal S1 [Normal S2] : normal S2 [Distant] : the heart sounds were distant [I] : a grade 1 [No Carotid Bruits] : no carotid bruits [No Abdominal Bruit] : a ~M bruit was not heard ~T in the abdomen [No Varicosities] : no varicosities [Pedal Pulses Present] : the pedal pulses are present [No Palpable Aorta] : no palpable aorta [No Extremity Clubbing/Cyanosis] : no extremity clubbing/cyanosis [Soft] : abdomen soft [Non Tender] : non-tender [Non-distended] : non-distended [No Masses] : no abdominal mass palpated [No HSM] : no HSM [Normal Bowel Sounds] : normal bowel sounds [Normal Posterior Cervical Nodes] : no posterior cervical lymphadenopathy [Normal Anterior Cervical Nodes] : no anterior cervical lymphadenopathy [No CVA Tenderness] : no CVA  tenderness [No Spinal Tenderness] : no spinal tenderness [No Joint Swelling] : no joint swelling [Grossly Normal Strength/Tone] : grossly normal strength/tone [No Rash] : no rash [Coordination Grossly Intact] : coordination grossly intact [No Focal Deficits] : no focal deficits [Normal Gait] : normal gait [Deep Tendon Reflexes (DTR)] : deep tendon reflexes were 2+ and symmetric [Speech Grossly Normal] : speech grossly normal [Memory Grossly Normal] : memory grossly normal [Normal Affect] : the affect was normal [Alert and Oriented x3] : oriented to person, place, and time [Normal Insight/Judgement] : insight and judgment were intact [___ +] : bilateral [unfilled]U+ pretibial pitting edema [de-identified] : color good [de-identified] : air movement is ok

## 2020-11-05 NOTE — HISTORY OF PRESENT ILLNESS
[FreeTextEntry1] : here to follow ongoing conditions [de-identified] : upgrade of PPI to biventricular pacer/defibrillator [facility] 7/24/2020, follow up CXR 8/1/2020 was ok, pt hasn’t felt well since then\par went in to AF after sotalol changed to metoprolol\par hospitalized 9/2 to 9/4/2020 for CHF exacerbation\par long history of COPD and asthma, using nebulizer\par history of coronary artery disease and past myocardial infarction in April of 2018 had a stent to a large 90% circumflex lesion with an ejection fraction at that time estimated at 30%\par echocardiogram was done with an ejection fraction of 35-40% 12/23/2019, was 25 - 30 % on echo 5/2020\par renal insufficiency has been stable\par depressive symptoms on duloxetine which was cut from 60 to 30 to 20 and is now off it, has not noticed anything\par INR 1.3 on warfarin  today \par no change in diet, but has been eating more, admits dietary non comliance\par no new meds\par did not tolerate trazodone trial for insomnia\par still on prednisone 10 mg for itching \par only fair control of HTN

## 2020-11-05 NOTE — REVIEW OF SYSTEMS
[Shortness Of Breath] : shortness of breath [Nocturia] : nocturia [Back Pain] : back pain [Insomnia] : insomnia [Depression] : depression [Negative] : Heme/Lymph [Dyspnea on Exertion] : dyspnea on exertion [Fever] : no fever [Chills] : no chills [Hot Flashes] : no hot flashes [Night Sweats] : no night sweats [Recent Change In Weight] : ~T no recent weight change [Postnasal Drip] : no postnasal drip [Nasal Discharge] : no nasal discharge [Chest Pain] : no chest pain [Palpitations] : no palpitations [Claudication] : no  leg claudication [Lower Ext Edema] : no lower extremity edema [Orthopena] : no orthopnea [Paroxysmal Nocturnal Dyspnea] : no paroxysmal nocturnal dyspnea [Wheezing] : no wheezing [Cough] : no cough [Abdominal Pain] : no abdominal pain [Nausea] : no nausea [Constipation] : no constipation [Diarrhea] : no diarrhea [Vomiting] : no vomiting [Heartburn] : no heartburn [Melena] : no melena [Dysuria] : no dysuria [Incontinence] : no incontinence [Hesitancy] : no hesitancy [Hematuria] : no hematuria [Frequency] : no frequency [Impotence] : no impotency [Poor Libido] : libido not poor [Joint Stiffness] : no joint stiffness [Itching] : no itching [Mole Changes] : no mole changes [Nail Changes] : no nail changes [Hair Changes] : no hair changes [Skin Rash] : no skin rash [Headache] : no headache [Dizziness] : no dizziness [Fainting] : no fainting [Confusion] : no confusion [Memory Loss] : no memory loss [Suicidal] : not suicidal [Anxiety] : no anxiety

## 2020-11-09 NOTE — ASSESSMENT
[FreeTextEntry1] : weight down 4 pounds from 2 days of increased diuretic\par INR still too low but coming up\par less edema\par lungs clear \par shortness of breath unchanged

## 2020-11-09 NOTE — PLAN
[FreeTextEntry1] : keep warfarin whole daily\par extra  AM torsemide 2x weekly\par pulmonary follow up\par RV next week

## 2020-11-09 NOTE — REVIEW OF SYSTEMS
[Shortness Of Breath] : shortness of breath [Dyspnea on Exertion] : dyspnea on exertion [Nocturia] : nocturia [Back Pain] : back pain [Insomnia] : insomnia [Depression] : depression [Negative] : Heme/Lymph [Fever] : no fever [Chills] : no chills [Hot Flashes] : no hot flashes [Night Sweats] : no night sweats [Recent Change In Weight] : ~T no recent weight change [Postnasal Drip] : no postnasal drip [Nasal Discharge] : no nasal discharge [Chest Pain] : no chest pain [Palpitations] : no palpitations [Claudication] : no  leg claudication [Lower Ext Edema] : no lower extremity edema [Orthopena] : no orthopnea [Paroxysmal Nocturnal Dyspnea] : no paroxysmal nocturnal dyspnea [Wheezing] : no wheezing [Cough] : no cough [Abdominal Pain] : no abdominal pain [Nausea] : no nausea [Constipation] : no constipation [Diarrhea] : no diarrhea [Vomiting] : no vomiting [Heartburn] : no heartburn [Melena] : no melena [Dysuria] : no dysuria [Incontinence] : no incontinence [Hesitancy] : no hesitancy [Hematuria] : no hematuria [Frequency] : no frequency [Impotence] : no impotency [Poor Libido] : libido not poor [Joint Stiffness] : no joint stiffness [Itching] : no itching [Mole Changes] : no mole changes [Nail Changes] : no nail changes [Hair Changes] : no hair changes [Skin Rash] : no skin rash [Headache] : no headache [Dizziness] : no dizziness [Fainting] : no fainting [Confusion] : no confusion [Memory Loss] : no memory loss [Suicidal] : not suicidal [Anxiety] : no anxiety

## 2020-11-09 NOTE — PHYSICAL EXAM
[No Acute Distress] : no acute distress [Well Nourished] : well nourished [Well Developed] : well developed [Well-Appearing] : well-appearing [Normal Sclera/Conjunctiva] : normal sclera/conjunctiva [Normal Outer Ear/Nose] : the outer ears and nose were normal in appearance [No JVD] : no jugular venous distention [No Lymphadenopathy] : no lymphadenopathy [Supple] : supple [No Respiratory Distress] : no respiratory distress  [Clear to Auscultation] : lungs were clear to auscultation bilaterally [Normal Rate] : normal [Heart Rate ___] : [unfilled] bpm [Premature Beats] : regular with premature beats [Normal S1] : normal S1 [Normal S2] : normal S2 [Distant] : the heart sounds were distant [I] : a grade 1 [No Carotid Bruits] : no carotid bruits [No Abdominal Bruit] : a ~M bruit was not heard ~T in the abdomen [No Varicosities] : no varicosities [Pedal Pulses Present] : the pedal pulses are present [No Palpable Aorta] : no palpable aorta [No Extremity Clubbing/Cyanosis] : no extremity clubbing/cyanosis [___ +] : bilateral [unfilled]U+ pretibial pitting edema [Soft] : abdomen soft [Non Tender] : non-tender [Non-distended] : non-distended [No Masses] : no abdominal mass palpated [No HSM] : no HSM [Normal Bowel Sounds] : normal bowel sounds [Normal Posterior Cervical Nodes] : no posterior cervical lymphadenopathy [Normal Anterior Cervical Nodes] : no anterior cervical lymphadenopathy [No CVA Tenderness] : no CVA  tenderness [No Spinal Tenderness] : no spinal tenderness [No Joint Swelling] : no joint swelling [Grossly Normal Strength/Tone] : grossly normal strength/tone [No Rash] : no rash [Coordination Grossly Intact] : coordination grossly intact [No Focal Deficits] : no focal deficits [Normal Gait] : normal gait [Deep Tendon Reflexes (DTR)] : deep tendon reflexes were 2+ and symmetric [Speech Grossly Normal] : speech grossly normal [Memory Grossly Normal] : memory grossly normal [Normal Affect] : the affect was normal [Alert and Oriented x3] : oriented to person, place, and time [Normal Insight/Judgement] : insight and judgment were intact [de-identified] : color good [de-identified] : air movement is ok

## 2020-11-09 NOTE — HISTORY OF PRESENT ILLNESS
[FreeTextEntry1] : here to follow ongoing conditions [de-identified] : upgrade of PPI to biventricular pacer/defibrillator [facility] 7/24/2020, follow up CXR 8/1/2020 was ok, pt hasn’t felt well since then\par went in to AF after sotalol changed to metoprolol\par hospitalized 9/2 to 9/4/2020 for CHF exacerbation\par subsequently in ER for weakness due to dehydration 9/11/2020\par long history of COPD and asthma, using nebulizer\par history of coronary artery disease and past myocardial infarction in April of 2018 had a stent to a large 90% circumflex lesion with an ejection fraction at that time estimated at 30%\par echocardiogram was done with an ejection fraction of 35-40% 12/23/2019, was 25 - 30 % on echo 5/2020\par renal insufficiency has been stable\par depressive symptoms on duloxetine which was cut from 60 to 30 to 20 and is now off it, has not noticed anything\par INR  low last week 1.3 on whole warfarin now at 1.7\par did not tolerate trazodone trial for insomnia\par off prednisone 10 mg for itching \par only fair control of HTN\par has appointment to see pulmonary Dr Mercer\par renal insufficiency

## 2020-11-20 NOTE — PLAN
[FreeTextEntry1] : At this time we will see if the optimization of the biventricular pacer helps if not we will send him to an allergist to consider trying valsartan or Entresto at an allergist office to see if he can tolerate it or see if other testing can be done to see if he is allergic to valsartan\par The fact that he is on prednisone at this time may make testing more difficult

## 2020-11-20 NOTE — HISTORY OF PRESENT ILLNESS
[FreeTextEntry1] : here to follow ongoing conditions [de-identified] : upgrade of PPI to biventricular pacer/defibrillator [facility] 7/24/2020, follow up CXR 8/1/2020 was ok, pt hasn’t felt well since then\par went in to AF after sotalol changed to metoprolol\par hospitalized 9/2 to 9/4/2020 for CHF exacerbation\par subsequently in ER for weakness due to dehydration 9/11/2020\par long history of COPD and asthma, using nebulizer\par history of coronary artery disease and past myocardial infarction in April of 2018 had a stent to a large 90% circumflex lesion with an ejection fraction at that time estimated at 30%\par echocardiogram was done with an ejection fraction of 35-40% 12/23/2019, was 25 - 30 % on echo 5/2020\par renal insufficiency has been stable\par depressive symptoms on duloxetine which was cut from 60 to 30 to 20 and is now off it, has not noticed anything\par INR  low last week 1.7 now at 3.2\par did not tolerate trazodone trial for insomnia\par renal insufficiency stage \par since last visit saw CHF specialist Jarrod Hahn\par saw Pulmonary DR Mercer given more prednisone , now on 40 mg daily as part of taper\par also saw Dr Kanner\par has TTE 11/24/2020 900 am to try to optimize pacer\par Patient saw no loss of congestive heart failure specialist earlier today and during the patient's visit I called Dr. Hahn to review the case\par He was suggesting starting Entresto however we did try valsartan as a step before trying Entresto in 2018 and after a couple of weeks the patient stopped it due to the development of a rash and itching around his neck\par Search of the patient's extensive old records found that this happened in May of 2018

## 2020-11-20 NOTE — PHYSICAL EXAM
[No Acute Distress] : no acute distress [Well Nourished] : well nourished [Well Developed] : well developed [Well-Appearing] : well-appearing [Normal Sclera/Conjunctiva] : normal sclera/conjunctiva [Normal Outer Ear/Nose] : the outer ears and nose were normal in appearance [No JVD] : no jugular venous distention [No Lymphadenopathy] : no lymphadenopathy [Supple] : supple [No Respiratory Distress] : no respiratory distress  [Clear to Auscultation] : lungs were clear to auscultation bilaterally [Normal Rate] : normal [Heart Rate ___] : [unfilled] bpm [Premature Beats] : regular with premature beats [Normal S1] : normal S1 [Normal S2] : normal S2 [Distant] : the heart sounds were distant [I] : a grade 1 [No Carotid Bruits] : no carotid bruits [No Abdominal Bruit] : a ~M bruit was not heard ~T in the abdomen [No Varicosities] : no varicosities [Pedal Pulses Present] : the pedal pulses are present [No Palpable Aorta] : no palpable aorta [No Extremity Clubbing/Cyanosis] : no extremity clubbing/cyanosis [Soft] : abdomen soft [Non Tender] : non-tender [Non-distended] : non-distended [No Masses] : no abdominal mass palpated [No HSM] : no HSM [Normal Bowel Sounds] : normal bowel sounds [Normal Posterior Cervical Nodes] : no posterior cervical lymphadenopathy [Normal Anterior Cervical Nodes] : no anterior cervical lymphadenopathy [No CVA Tenderness] : no CVA  tenderness [No Spinal Tenderness] : no spinal tenderness [No Joint Swelling] : no joint swelling [Grossly Normal Strength/Tone] : grossly normal strength/tone [No Rash] : no rash [Coordination Grossly Intact] : coordination grossly intact [No Focal Deficits] : no focal deficits [Normal Gait] : normal gait [Deep Tendon Reflexes (DTR)] : deep tendon reflexes were 2+ and symmetric [Speech Grossly Normal] : speech grossly normal [Memory Grossly Normal] : memory grossly normal [Normal Affect] : the affect was normal [Alert and Oriented x3] : oriented to person, place, and time [Normal Insight/Judgement] : insight and judgment were intact [___ +] : bilateral [unfilled]U+ pretibial pitting edema [de-identified] : color good [de-identified] : air movement is ok

## 2020-11-20 NOTE — ASSESSMENT
[FreeTextEntry1] : weight up a few pound ? from prednisone\par INR just too high 3.2\par more edema\par lungs clear \par shortness of breath unchanged

## 2020-12-04 PROBLEM — L29.8 PRURITUS SENILIS: Status: ACTIVE | Noted: 2020-01-01

## 2020-12-06 NOTE — PLAN
[FreeTextEntry1] : hold warfarin 2 days then go on whole whole half 3 day cycle\par same other meds\par RV 1 week

## 2020-12-06 NOTE — REVIEW OF SYSTEMS
[Shortness Of Breath] : shortness of breath [Dyspnea on Exertion] : dyspnea on exertion [Nocturia] : nocturia [Back Pain] : back pain [Insomnia] : insomnia [Depression] : depression [Negative] : Heme/Lymph [Fever] : no fever [Chills] : no chills [Hot Flashes] : no hot flashes [Night Sweats] : no night sweats [Recent Change In Weight] : ~T no recent weight change [Postnasal Drip] : no postnasal drip [Nasal Discharge] : no nasal discharge [Chest Pain] : no chest pain [Palpitations] : no palpitations [Claudication] : no  leg claudication [Lower Ext Edema] : no lower extremity edema [Orthopena] : no orthopnea [Paroxysmal Nocturnal Dyspnea] : no paroxysmal nocturnal dyspnea [Wheezing] : no wheezing [Cough] : no cough [Abdominal Pain] : no abdominal pain [Nausea] : no nausea [Constipation] : no constipation [Diarrhea] : no diarrhea [Vomiting] : no vomiting [Heartburn] : no heartburn [Melena] : no melena [Dysuria] : no dysuria [Incontinence] : no incontinence [Hesitancy] : no hesitancy [Hematuria] : no hematuria [Frequency] : no frequency [Impotence] : no impotency [Poor Libido] : libido not poor [Joint Stiffness] : no joint stiffness [Itching] : itching [Mole Changes] : no mole changes [Nail Changes] : no nail changes [Hair Changes] : no hair changes [Skin Rash] : no skin rash [Headache] : no headache [Dizziness] : no dizziness [Fainting] : no fainting [Confusion] : no confusion [Memory Loss] : no memory loss [Suicidal] : not suicidal [Anxiety] : no anxiety

## 2020-12-06 NOTE — ASSESSMENT
[FreeTextEntry1] : weight down likely from withdrawal of steroids\par itching worse likely from that as well\par INR too high \par less edema\par lungs clear \par

## 2020-12-06 NOTE — PHYSICAL EXAM
[No Acute Distress] : no acute distress [Well Nourished] : well nourished [Well Developed] : well developed [Well-Appearing] : well-appearing [Normal Sclera/Conjunctiva] : normal sclera/conjunctiva [Normal Outer Ear/Nose] : the outer ears and nose were normal in appearance [No JVD] : no jugular venous distention [No Lymphadenopathy] : no lymphadenopathy [Supple] : supple [No Respiratory Distress] : no respiratory distress  [Clear to Auscultation] : lungs were clear to auscultation bilaterally [Normal Rate] : normal [Heart Rate ___] : [unfilled] bpm [Premature Beats] : regular with premature beats [Normal S1] : normal S1 [Normal S2] : normal S2 [Distant] : the heart sounds were distant [I] : a grade 1 [No Carotid Bruits] : no carotid bruits [No Abdominal Bruit] : a ~M bruit was not heard ~T in the abdomen [No Varicosities] : no varicosities [Pedal Pulses Present] : the pedal pulses are present [No Palpable Aorta] : no palpable aorta [No Extremity Clubbing/Cyanosis] : no extremity clubbing/cyanosis [___ +] : bilateral [unfilled]U+ pretibial pitting edema [Soft] : abdomen soft [Non Tender] : non-tender [Non-distended] : non-distended [No Masses] : no abdominal mass palpated [No HSM] : no HSM [Normal Bowel Sounds] : normal bowel sounds [Normal Posterior Cervical Nodes] : no posterior cervical lymphadenopathy [Normal Anterior Cervical Nodes] : no anterior cervical lymphadenopathy [No CVA Tenderness] : no CVA  tenderness [No Spinal Tenderness] : no spinal tenderness [No Joint Swelling] : no joint swelling [Grossly Normal Strength/Tone] : grossly normal strength/tone [No Rash] : no rash [Coordination Grossly Intact] : coordination grossly intact [No Focal Deficits] : no focal deficits [Normal Gait] : normal gait [Deep Tendon Reflexes (DTR)] : deep tendon reflexes were 2+ and symmetric [Speech Grossly Normal] : speech grossly normal [Memory Grossly Normal] : memory grossly normal [Normal Affect] : the affect was normal [Alert and Oriented x3] : oriented to person, place, and time [Normal Insight/Judgement] : insight and judgment were intact [de-identified] : color good [de-identified] : air movement is good

## 2020-12-06 NOTE — HISTORY OF PRESENT ILLNESS
[FreeTextEntry1] : here to follow ongoing conditions [de-identified] : upgrade of PPI to biventricular pacer/defibrillator [facility] 7/24/2020, follow up CXR 8/1/2020 was ok, pt hasn’t felt well since then\par went in to AF after sotalol changed to metoprolol\par hospitalized 9/2 to 9/4/2020 for CHF exacerbation\par subsequently in ER for weakness due to dehydration 9/11/2020\par long history of COPD and asthma, using nebulizer\par history of coronary artery disease and past myocardial infarction in April of 2018 had a stent to a large 90% circumflex lesion with an ejection fraction at that time estimated at 30%\par echocardiogram was done with an ejection fraction of 35-40% 12/23/2019, was 25 - 30 % on echo 5/2020\par renal insufficiency has been stable\par depressive symptoms on duloxetine which was cut from 60 to 30 to 20 and is now off it, has not noticed anything\par INR too high at 4.6\par renal insufficiency \par saw CHF specialist Jarrod Hahn\par saw Pulmonary DR Mercer given more prednisone , now off prednisone\par also saw Dr Kanner recently for PPI adjustment optimization, 10 days ago\par quality of life affected by constant itching

## 2020-12-11 NOTE — PHYSICAL EXAM
[No Acute Distress] : no acute distress [Well Nourished] : well nourished [Well Developed] : well developed [Well-Appearing] : well-appearing [Normal Sclera/Conjunctiva] : normal sclera/conjunctiva [Normal Outer Ear/Nose] : the outer ears and nose were normal in appearance [No JVD] : no jugular venous distention [No Lymphadenopathy] : no lymphadenopathy [Supple] : supple [No Respiratory Distress] : no respiratory distress  [Clear to Auscultation] : lungs were clear to auscultation bilaterally [Normal Rate] : normal [Heart Rate ___] : [unfilled] bpm [Premature Beats] : regular with premature beats [Normal S1] : normal S1 [Normal S2] : normal S2 [Distant] : the heart sounds were distant [I] : a grade 1 [No Carotid Bruits] : no carotid bruits [No Abdominal Bruit] : a ~M bruit was not heard ~T in the abdomen [No Varicosities] : no varicosities [Pedal Pulses Present] : the pedal pulses are present [No Palpable Aorta] : no palpable aorta [No Extremity Clubbing/Cyanosis] : no extremity clubbing/cyanosis [___ +] : bilateral [unfilled]U+ pretibial pitting edema [Soft] : abdomen soft [Non Tender] : non-tender [Non-distended] : non-distended [No Masses] : no abdominal mass palpated [No HSM] : no HSM [Normal Bowel Sounds] : normal bowel sounds [Normal Posterior Cervical Nodes] : no posterior cervical lymphadenopathy [Normal Anterior Cervical Nodes] : no anterior cervical lymphadenopathy [No CVA Tenderness] : no CVA  tenderness [No Spinal Tenderness] : no spinal tenderness [No Joint Swelling] : no joint swelling [Grossly Normal Strength/Tone] : grossly normal strength/tone [No Rash] : no rash [Coordination Grossly Intact] : coordination grossly intact [No Focal Deficits] : no focal deficits [Normal Gait] : normal gait [Deep Tendon Reflexes (DTR)] : deep tendon reflexes were 2+ and symmetric [Speech Grossly Normal] : speech grossly normal [Memory Grossly Normal] : memory grossly normal [Normal Affect] : the affect was normal [Alert and Oriented x3] : oriented to person, place, and time [Normal Insight/Judgement] : insight and judgment were intact [de-identified] : color good [de-identified] : air movement is good

## 2020-12-11 NOTE — HISTORY OF PRESENT ILLNESS
[FreeTextEntry1] : here to follow ongoing conditions [de-identified] : upgrade of PPI to biventricular pacer/defibrillator [facility] 7/24/2020\par went in to AF after sotalol changed to metoprolol\par hospitalized 9/2 to 9/4/2020 for CHF exacerbation\par subsequently in ER for weakness due to dehydration 9/11/2020\par long history of COPD and asthma, using nebulizer\par history of coronary artery disease and past myocardial infarction in April of 2018 had a stent to a large 90% circumflex lesion with an ejection fraction at that time estimated at 30%\par echocardiogram was done with an ejection fraction of 35-40% 12/23/2019, was 25 - 30 % on echo 5/2020\par renal insufficiency has been stable\par INR perfect at 2.0\par saw CHF specialist Jarrod Hahn\par saw Pulmonary DR Mercer given more prednisone , now off prednisone\par also saw Dr Kanner recently for PPI adjustment optimization, 17 days ago\par troublesome itching\par saw allergist and started Entresto in his office, no alergic reaction, 3 days ago\par wife feels he is depressed

## 2020-12-11 NOTE — ASSESSMENT
[FreeTextEntry1] : INR in range\par less edema\par lungs clear \par some depression\par CHF not apparent on exam\par tolerating Entresto so far

## 2020-12-11 NOTE — PLAN
[FreeTextEntry1] : warfarin same whole whole half regimen\par RV 1 week, recheck INR and check renal function\par refuses to see psychologist or psychiatrist

## 2020-12-18 PROBLEM — L40.9 PSORIASIS: Status: ACTIVE | Noted: 2018-11-21

## 2020-12-18 NOTE — PLAN
[FreeTextEntry1] : Check bloods\par If kidneys will allow it we will try to increase the Entresto to the next dose\par Same warfarin

## 2020-12-18 NOTE — REVIEW OF SYSTEMS
[Dyspnea on Exertion] : dyspnea on exertion [Nocturia] : nocturia [Back Pain] : back pain [Itching] : itching [Insomnia] : insomnia [Depression] : depression [Negative] : Heme/Lymph [Fever] : no fever [Chills] : no chills [Hot Flashes] : no hot flashes [Night Sweats] : no night sweats [Recent Change In Weight] : ~T no recent weight change [Postnasal Drip] : no postnasal drip [Nasal Discharge] : no nasal discharge [Chest Pain] : no chest pain [Palpitations] : no palpitations [Claudication] : no  leg claudication [Lower Ext Edema] : no lower extremity edema [Orthopena] : no orthopnea [Paroxysmal Nocturnal Dyspnea] : no paroxysmal nocturnal dyspnea [Shortness Of Breath] : no shortness of breath [Wheezing] : no wheezing [Cough] : no cough [Abdominal Pain] : no abdominal pain [Nausea] : no nausea [Constipation] : no constipation [Diarrhea] : no diarrhea [Vomiting] : no vomiting [Heartburn] : no heartburn [Melena] : no melena [Dysuria] : no dysuria [Incontinence] : no incontinence [Hesitancy] : no hesitancy [Hematuria] : no hematuria [Frequency] : no frequency [Impotence] : no impotency [Poor Libido] : libido not poor [Joint Stiffness] : no joint stiffness [Mole Changes] : no mole changes [Nail Changes] : no nail changes [Hair Changes] : no hair changes [Skin Rash] : no skin rash [Headache] : no headache [Dizziness] : no dizziness [Fainting] : no fainting [Confusion] : no confusion [Memory Loss] : no memory loss [Suicidal] : not suicidal [Anxiety] : no anxiety [FreeTextEntry6] : improved [de-identified] : better

## 2020-12-18 NOTE — PHYSICAL EXAM
[No Acute Distress] : no acute distress [Well Nourished] : well nourished [Well Developed] : well developed [Well-Appearing] : well-appearing [Normal Sclera/Conjunctiva] : normal sclera/conjunctiva [Normal Outer Ear/Nose] : the outer ears and nose were normal in appearance [No JVD] : no jugular venous distention [No Lymphadenopathy] : no lymphadenopathy [Supple] : supple [No Respiratory Distress] : no respiratory distress  [Clear to Auscultation] : lungs were clear to auscultation bilaterally [Normal Rate] : normal [Heart Rate ___] : [unfilled] bpm [Premature Beats] : regular with premature beats [Normal S1] : normal S1 [Normal S2] : normal S2 [Distant] : the heart sounds were distant [I] : a grade 1 [No Carotid Bruits] : no carotid bruits [No Abdominal Bruit] : a ~M bruit was not heard ~T in the abdomen [No Varicosities] : no varicosities [Pedal Pulses Present] : the pedal pulses are present [No Palpable Aorta] : no palpable aorta [No Extremity Clubbing/Cyanosis] : no extremity clubbing/cyanosis [___ +] : bilateral [unfilled]U+ pretibial pitting edema [Soft] : abdomen soft [Non Tender] : non-tender [Non-distended] : non-distended [No Masses] : no abdominal mass palpated [No HSM] : no HSM [Normal Bowel Sounds] : normal bowel sounds [Normal Posterior Cervical Nodes] : no posterior cervical lymphadenopathy [Normal Anterior Cervical Nodes] : no anterior cervical lymphadenopathy [No CVA Tenderness] : no CVA  tenderness [No Spinal Tenderness] : no spinal tenderness [No Joint Swelling] : no joint swelling [Grossly Normal Strength/Tone] : grossly normal strength/tone [No Rash] : no rash [Coordination Grossly Intact] : coordination grossly intact [No Focal Deficits] : no focal deficits [Normal Gait] : normal gait [Deep Tendon Reflexes (DTR)] : deep tendon reflexes were 2+ and symmetric [Speech Grossly Normal] : speech grossly normal [Memory Grossly Normal] : memory grossly normal [Normal Affect] : the affect was normal [Alert and Oriented x3] : oriented to person, place, and time [Normal Insight/Judgement] : insight and judgment were intact [de-identified] : color good [de-identified] : air movement good

## 2020-12-18 NOTE — ASSESSMENT
[FreeTextEntry1] : INR in range\par lungs clear \par definite clinical improvement on Entresto\par ? if also improved after adjustment of biventricular pacemaker

## 2020-12-18 NOTE — HISTORY OF PRESENT ILLNESS
[FreeTextEntry1] : here to follow ongoing conditions [de-identified] : upgrade of PPI to biventricular pacer/defibrillator [facility] 7/24/2020\par went in to AF after sotalol changed to metoprolol\par hospitalized 9/2 to 9/4/2020 for CHF exacerbation\par subsequently in ER for weakness due to dehydration 9/11/2020\par long history of COPD and asthma, using nebulizer\par history of coronary artery disease and past myocardial infarction in April of 2018 had a stent to a large 90% circumflex lesion with an ejection fraction at that time estimated at 30%\par echocardiogram was done with an ejection fraction of 35-40% 12/23/2019, was 25 - 30 % on echo 5/2020\par renal insufficiency has been stable\par INR 2.5 today\par saw CHF specialist Jarrod Hahn\par uses prednisone prn itching, used 20 mf 3 times since last week\par also saw Dr Kanner recently for PPI adjustment optimization, 24 days ago\par saw allergist and started Entresto in his office, no allergic reaction, 10 days ago\par wife feels he is depressed\par notes definite improvement since last week in his breathing and exertional abilities

## 2020-12-22 PROBLEM — I49.5 SICK SINUS SYNDROME: Status: ACTIVE | Noted: 2018-11-21

## 2020-12-22 NOTE — REVIEW OF SYSTEMS
[Dyspnea on Exertion] : dyspnea on exertion [Nocturia] : nocturia [Back Pain] : back pain [Itching] : itching [Insomnia] : insomnia [Depression] : depression [Negative] : Heme/Lymph [Fever] : no fever [Chills] : no chills [Hot Flashes] : no hot flashes [Night Sweats] : no night sweats [Recent Change In Weight] : ~T no recent weight change [Postnasal Drip] : no postnasal drip [Nasal Discharge] : no nasal discharge [Chest Pain] : no chest pain [Palpitations] : no palpitations [Claudication] : no  leg claudication [Lower Ext Edema] : no lower extremity edema [Orthopena] : no orthopnea [Paroxysmal Nocturnal Dyspnea] : no paroxysmal nocturnal dyspnea [Shortness Of Breath] : no shortness of breath [Wheezing] : no wheezing [Cough] : no cough [Abdominal Pain] : no abdominal pain [Nausea] : no nausea [Constipation] : no constipation [Diarrhea] : no diarrhea [Vomiting] : no vomiting [Heartburn] : no heartburn [Melena] : no melena [Dysuria] : no dysuria [Incontinence] : no incontinence [Hesitancy] : no hesitancy [Hematuria] : no hematuria [Frequency] : no frequency [Impotence] : no impotency [Poor Libido] : libido not poor [Joint Stiffness] : no joint stiffness [Mole Changes] : no mole changes [Nail Changes] : no nail changes [Hair Changes] : no hair changes [Skin Rash] : no skin rash [Headache] : no headache [Dizziness] : no dizziness [Fainting] : no fainting [Confusion] : no confusion [Memory Loss] : no memory loss [Suicidal] : not suicidal [Anxiety] : no anxiety [FreeTextEntry6] : improved [de-identified] : better

## 2020-12-22 NOTE — PHYSICAL EXAM
[No Acute Distress] : no acute distress [Well Nourished] : well nourished [Well Developed] : well developed [Well-Appearing] : well-appearing [Normal Sclera/Conjunctiva] : normal sclera/conjunctiva [Normal Outer Ear/Nose] : the outer ears and nose were normal in appearance [No JVD] : no jugular venous distention [No Lymphadenopathy] : no lymphadenopathy [Supple] : supple [No Respiratory Distress] : no respiratory distress  [Clear to Auscultation] : lungs were clear to auscultation bilaterally [Normal Rate] : normal [Heart Rate ___] : [unfilled] bpm [Premature Beats] : regular with premature beats [Normal S1] : normal S1 [Normal S2] : normal S2 [Distant] : the heart sounds were distant [I] : a grade 1 [No Carotid Bruits] : no carotid bruits [No Abdominal Bruit] : a ~M bruit was not heard ~T in the abdomen [No Varicosities] : no varicosities [Pedal Pulses Present] : the pedal pulses are present [No Palpable Aorta] : no palpable aorta [No Extremity Clubbing/Cyanosis] : no extremity clubbing/cyanosis [___ +] : bilateral [unfilled]U+ pretibial pitting edema [Soft] : abdomen soft [Non Tender] : non-tender [Non-distended] : non-distended [No Masses] : no abdominal mass palpated [No HSM] : no HSM [Normal Bowel Sounds] : normal bowel sounds [Normal Posterior Cervical Nodes] : no posterior cervical lymphadenopathy [Normal Anterior Cervical Nodes] : no anterior cervical lymphadenopathy [No CVA Tenderness] : no CVA  tenderness [No Spinal Tenderness] : no spinal tenderness [No Joint Swelling] : no joint swelling [Grossly Normal Strength/Tone] : grossly normal strength/tone [No Rash] : no rash [Coordination Grossly Intact] : coordination grossly intact [No Focal Deficits] : no focal deficits [Normal Gait] : normal gait [Deep Tendon Reflexes (DTR)] : deep tendon reflexes were 2+ and symmetric [Speech Grossly Normal] : speech grossly normal [Memory Grossly Normal] : memory grossly normal [Normal Affect] : the affect was normal [Alert and Oriented x3] : oriented to person, place, and time [Normal Insight/Judgement] : insight and judgment were intact [de-identified] : color good [de-identified] : air movement good

## 2020-12-22 NOTE — HISTORY OF PRESENT ILLNESS
[FreeTextEntry1] : here to follow ongoing conditions [de-identified] : upgrade of PPI to biventricular pacer/defibrillator [facility] 7/24/2020\par went in to AF after sotalol changed to metoprolol\par hospitalized 9/2 to 9/4/2020 for CHF exacerbation\par subsequently in ER for weakness due to dehydration 9/11/2020\par long history of COPD and asthma, using nebulizer\par history of coronary artery disease and past myocardial infarction in April of 2018 had a stent to a large 90% circumflex lesion with an ejection fraction at that time estimated at 30%\par echocardiogram was done with an ejection fraction of 35-40% 12/23/2019, was 25 - 30 % on echo 5/2020\par Entresto was restared at lowest dose but after 10 days his potassium increased to 6.3 with his creatinine increasing to 2.91 from 2.15\par renal insufficiency had been stable\par INR to be checked with other bloods today\par saw CHF specialist Jarrod Hahn\par uses prednisone prn itching\par also saw Dr Kanner recently for PPI adjustment optimization, 28 days ago\par saw allergist and started Entresto in his office, no allergic reaction, had ? allergy to valsarta rash 2 years ago,\par patient had significant improvement in his CHF symptoms on Entresto\par Entresto was stopped on 12/19/2020 after bloods showed elevation in K and creatinine\par Patient notes that in the 3 days of stopping it he feels worse without the Entresto

## 2020-12-22 NOTE — ASSESSMENT
[FreeTextEntry1] : Clinically patient did much better on Entresto however raise his potassium and his creatinine\par Since nothing else has worked my plan is to try to get him back on Entresto if at all possible\par We may need to have him go back and see kidney specialist who he has not seen for Ménière's to help us with this\par Possibly we could give him Kayexalate on a daily basis to keep his potassium down and put him on a low potassium diet

## 2020-12-23 PROBLEM — E87.5 SERUM POTASSIUM ELEVATED: Status: ACTIVE | Noted: 2020-01-01

## 2020-12-23 NOTE — REVIEW OF SYSTEMS
[Nocturia] : nocturia [Back Pain] : back pain [Itching] : itching [Insomnia] : insomnia [Depression] : depression [Negative] : Heme/Lymph [Fever] : no fever [Chills] : no chills [Hot Flashes] : no hot flashes [Night Sweats] : no night sweats [Recent Change In Weight] : ~T no recent weight change [Postnasal Drip] : no postnasal drip [Nasal Discharge] : no nasal discharge [Chest Pain] : no chest pain [Palpitations] : no palpitations [Claudication] : no  leg claudication [Lower Ext Edema] : no lower extremity edema [Orthopena] : no orthopnea [Paroxysmal Nocturnal Dyspnea] : no paroxysmal nocturnal dyspnea [Shortness Of Breath] : no shortness of breath [Wheezing] : no wheezing [Cough] : no cough [Dyspnea on Exertion] : not dyspnea on exertion [Abdominal Pain] : no abdominal pain [Nausea] : no nausea [Constipation] : no constipation [Diarrhea] : no diarrhea [Vomiting] : no vomiting [Heartburn] : no heartburn [Melena] : no melena [Dysuria] : no dysuria [Incontinence] : no incontinence [Hesitancy] : no hesitancy [Hematuria] : no hematuria [Frequency] : no frequency [Impotence] : no impotency [Poor Libido] : libido not poor [Joint Stiffness] : no joint stiffness [Mole Changes] : no mole changes [Nail Changes] : no nail changes [Hair Changes] : no hair changes [Skin Rash] : no skin rash [Headache] : no headache [Dizziness] : no dizziness [Fainting] : no fainting [Confusion] : no confusion [Memory Loss] : no memory loss [Suicidal] : not suicidal [Anxiety] : no anxiety [FreeTextEntry6] : improved [de-identified] : better

## 2020-12-23 NOTE — ASSESSMENT
[FreeTextEntry1] : Clinically patient did much better on Entresto which raised K and creatinine\par Since nothing else has worked my plan is to try to get him back on Entresto if at all possible\par We need to have him go back and see kidney specialist Delvis Ledesma last seen 2013\par Possibly we could give him Kayexalate on a daily basis to keep his potassium down and put him on a low potassium diet, perhaps using 1/2 of lowest Entresto dosage

## 2020-12-23 NOTE — HISTORY OF PRESENT ILLNESS
[FreeTextEntry1] : here to follow ongoing conditions [de-identified] : upgrade of PPI to biventricular pacer/defibrillator [facility] 7/24/2020\par went in to AF after sotalol changed to metoprolol\par hospitalized 9/2 to 9/4/2020 for CHF exacerbation\par subsequently in ER for weakness due to dehydration 9/11/2020\par long history of COPD and asthma\par history of coronary artery disease and past myocardial infarction in April of 2018 had a stent to a large 90% circumflex lesion with an ejection fraction at that time estimated at 30%\par echocardiogram was done with an ejection fraction of 35-40% 12/23/2019, was 25 - 30 % on echo 5/2020\par Entresto was restared at lowest dose but after 10 days his potassium increased to 6.3 with his creatinine increasing to 2.91 from 2.15\par renal insufficiency had been stable\par saw CHF specialist Jarrod Hahn\par uses prednisone prn itching\par also saw Dr Kanner recently for echo guided PPI adjustment optimization 11/24/2020\par saw allergist and started Entresto in his office, no allergic reaction, had ? allergy to valsartan rash 2 years ago,\par patient had significant improvement in his CHF symptoms on Entresto\par Entresto was stopped on 12/19/2020 after bloods showed elevation in K and creatinine\par Patient notes he feels worse without the Entresto\par INR was low 2 days ago\par on his own, took 1/2 Entresto yesterday morning and this morning\par INR 1.8 today

## 2020-12-23 NOTE — PLAN
[FreeTextEntry1] : recheck bloods\par same warfarin, cause for decrease unclear\par possibly restart 1/2 Entresto bid tomorrow\par RV 1 week to see Dr Rosa

## 2020-12-23 NOTE — PHYSICAL EXAM
[No Acute Distress] : no acute distress [Well Nourished] : well nourished [Well Developed] : well developed [Well-Appearing] : well-appearing [Normal Sclera/Conjunctiva] : normal sclera/conjunctiva [Normal Outer Ear/Nose] : the outer ears and nose were normal in appearance [No JVD] : no jugular venous distention [No Lymphadenopathy] : no lymphadenopathy [Supple] : supple [No Respiratory Distress] : no respiratory distress  [Clear to Auscultation] : lungs were clear to auscultation bilaterally [Normal Rate] : normal [Heart Rate ___] : [unfilled] bpm [Premature Beats] : regular with premature beats [Normal S1] : normal S1 [Normal S2] : normal S2 [Distant] : the heart sounds were distant [I] : a grade 1 [No Carotid Bruits] : no carotid bruits [No Abdominal Bruit] : a ~M bruit was not heard ~T in the abdomen [No Varicosities] : no varicosities [Pedal Pulses Present] : the pedal pulses are present [No Palpable Aorta] : no palpable aorta [No Extremity Clubbing/Cyanosis] : no extremity clubbing/cyanosis [___ +] : bilateral [unfilled]U+ pretibial pitting edema [Soft] : abdomen soft [Non Tender] : non-tender [Non-distended] : non-distended [No Masses] : no abdominal mass palpated [No HSM] : no HSM [Normal Bowel Sounds] : normal bowel sounds [Normal Posterior Cervical Nodes] : no posterior cervical lymphadenopathy [Normal Anterior Cervical Nodes] : no anterior cervical lymphadenopathy [No CVA Tenderness] : no CVA  tenderness [No Spinal Tenderness] : no spinal tenderness [No Joint Swelling] : no joint swelling [Grossly Normal Strength/Tone] : grossly normal strength/tone [No Rash] : no rash [Coordination Grossly Intact] : coordination grossly intact [No Focal Deficits] : no focal deficits [Normal Gait] : normal gait [Deep Tendon Reflexes (DTR)] : deep tendon reflexes were 2+ and symmetric [Speech Grossly Normal] : speech grossly normal [Memory Grossly Normal] : memory grossly normal [Normal Affect] : the affect was normal [Alert and Oriented x3] : oriented to person, place, and time [Normal Insight/Judgement] : insight and judgment were intact [de-identified] : color good [de-identified] : air movement good

## 2020-12-30 NOTE — REVIEW OF SYSTEMS
[Fever] : no fever [Chills] : no chills [Blurry Vision] : no blurred vision [Eyeglasses] : not currently wearing eyeglasses [Earache] : no earache [Shortness Of Breath] : shortness of breath [Lower Ext Edema] : lower extremity edema [Cough] : no cough [Abdominal Pain] : no abdominal pain [Heartburn] : no heartburn [Dysphagia] : no dysphagia [Urinary Frequency] : no change in urinary frequency [Impotence] : no impotence [Joint Pain] : no joint pain [Muscle Cramps] : no muscle cramps [Skin: A Rash] : no rash: [Skin Lesions] : no skin lesions [Dizziness] : no dizziness [Easy Bleeding] : no tendency for easy bleeding [Easy Bruising] : no tendency for easy bruising

## 2020-12-30 NOTE — PHYSICAL EXAM
[General Appearance - Well Developed] : well developed [Normal Appearance] : normal appearance [Well Groomed] : well groomed [General Appearance - Well Nourished] : well nourished [No Deformities] : no deformities [General Appearance - In No Acute Distress] : no acute distress [Normal Conjunctiva] : the conjunctiva exhibited no abnormalities [Eyelids - No Xanthelasma] : the eyelids demonstrated no xanthelasmas [Normal Oral Mucosa] : normal oral mucosa [No Oral Pallor] : no oral pallor [No Oral Cyanosis] : no oral cyanosis [Normal Jugular Venous A Waves Present] : normal jugular venous A waves present [Normal Jugular Venous V Waves Present] : normal jugular venous V waves present [No Jugular Venous Stern A Waves] : no jugular venous stern A waves [Murmurs] : no murmurs present [FreeTextEntry1] : 1+ edema bilateral LE, irregular  [Respiration, Rhythm And Depth] : normal respiratory rhythm and effort [Exaggerated Use Of Accessory Muscles For Inspiration] : no accessory muscle use [Auscultation Breath Sounds / Voice Sounds] : lungs were clear to auscultation bilaterally [Abdomen Soft] : soft [Abdomen Tenderness] : non-tender [Abdomen Mass (___ Cm)] : no abdominal mass palpated [Abnormal Walk] : normal gait [Gait - Sufficient For Exercise Testing] : the gait was sufficient for exercise testing [Nail Clubbing] : no clubbing of the fingernails [Cyanosis, Localized] : no localized cyanosis [Petechial Hemorrhages (___cm)] : no petechial hemorrhages [Skin Color & Pigmentation] : normal skin color and pigmentation [] : no rash [No Venous Stasis] : no venous stasis [Skin Lesions] : no skin lesions [No Skin Ulcers] : no skin ulcer [No Xanthoma] : no  xanthoma was observed [Oriented To Time, Place, And Person] : oriented to person, place, and time [Affect] : the affect was normal [Mood] : the mood was normal [No Anxiety] : not feeling anxious

## 2020-12-30 NOTE — DISCUSSION/SUMMARY
[FreeTextEntry1] : HFrEF: NYHA III symptoms. Appears more fluid overloaded today. Double torsemide to 20mg BID (He had been taking 20mg qAM, 10mg qPM). Cont 1/2 dose Entresto, sotalol. 7 lb weight gain since last week. Consider adding metolazone \par \par -Check CMP, pro BNP today\par \par HTN: BP markedly elevated today, likely responsible for worsening fluid overload. \par \par Add imdur 30mg, Continue hydralazine BID, amlodipine\par \par PAF: INR 2.0 today, cont same dosing of warfarin, cont sotalol\par \par CAD: Stable, no CP. Cont atorvastatin, plavix monotherapy \par \par To see Dr. Posadas next week\par Add imdur, torsemide up to 20mg BID\par BMP, pro BNP \par \par

## 2020-12-30 NOTE — HISTORY OF PRESENT ILLNESS
[FreeTextEntry1] : 80M with ischemic cardiomyopathy, HFrEF s/p BiV ICD, pAF, who presents for follow up from Dr. Posadas\par \par Seen last week, noted with elevated Crt, K. Put on kayexalate, Entresto d/c'ed. \par Now back on 1/2 dose Entresto, feels much better while on Entresto\par Worsening LE edema as of late\par Chronic dyspnea on exertion, unchanged\par \par Amlodipine 5mg, Plavix 75mg, Entresto 24-26 (0.5 pills), Hydralazine 25mg BID, Torsemide 10mg BID, Warfarin 5mg daily, Sotalol 80mg BID

## 2021-01-01 ENCOUNTER — APPOINTMENT (OUTPATIENT)
Dept: INTERNAL MEDICINE | Facility: CLINIC | Age: 81
End: 2021-01-01
Payer: MEDICARE

## 2021-01-01 ENCOUNTER — LABORATORY RESULT (OUTPATIENT)
Age: 81
End: 2021-01-01

## 2021-01-01 ENCOUNTER — NON-APPOINTMENT (OUTPATIENT)
Age: 81
End: 2021-01-01

## 2021-01-01 ENCOUNTER — RX RENEWAL (OUTPATIENT)
Age: 81
End: 2021-01-01

## 2021-01-01 ENCOUNTER — APPOINTMENT (OUTPATIENT)
Dept: INTERNAL MEDICINE | Facility: CLINIC | Age: 81
End: 2021-01-01

## 2021-01-01 VITALS
TEMPERATURE: 97.6 F | WEIGHT: 170 LBS | SYSTOLIC BLOOD PRESSURE: 189 MMHG | DIASTOLIC BLOOD PRESSURE: 90 MMHG | HEART RATE: 59 BPM | BODY MASS INDEX: 27.32 KG/M2 | HEIGHT: 66 IN | OXYGEN SATURATION: 97 %

## 2021-01-01 VITALS — SYSTOLIC BLOOD PRESSURE: 182 MMHG | DIASTOLIC BLOOD PRESSURE: 80 MMHG | HEART RATE: 60 BPM

## 2021-01-01 VITALS — BODY MASS INDEX: 26.52 KG/M2 | HEIGHT: 66 IN | WEIGHT: 165 LBS

## 2021-01-01 VITALS — WEIGHT: 178 LBS | BODY MASS INDEX: 28.61 KG/M2 | HEIGHT: 66 IN

## 2021-01-01 VITALS
SYSTOLIC BLOOD PRESSURE: 122 MMHG | OXYGEN SATURATION: 96 % | DIASTOLIC BLOOD PRESSURE: 62 MMHG | BODY MASS INDEX: 27.44 KG/M2 | TEMPERATURE: 97.8 F | HEART RATE: 60 BPM | WEIGHT: 170 LBS

## 2021-01-01 VITALS
BODY MASS INDEX: 2.74 KG/M2 | TEMPERATURE: 98.2 F | OXYGEN SATURATION: 98 % | HEART RATE: 60 BPM | WEIGHT: 17 LBS | DIASTOLIC BLOOD PRESSURE: 62 MMHG | SYSTOLIC BLOOD PRESSURE: 130 MMHG

## 2021-01-01 VITALS — DIASTOLIC BLOOD PRESSURE: 76 MMHG | SYSTOLIC BLOOD PRESSURE: 126 MMHG

## 2021-01-01 VITALS — SYSTOLIC BLOOD PRESSURE: 114 MMHG | DIASTOLIC BLOOD PRESSURE: 60 MMHG

## 2021-01-01 VITALS — DIASTOLIC BLOOD PRESSURE: 40 MMHG | SYSTOLIC BLOOD PRESSURE: 70 MMHG

## 2021-01-01 VITALS — DIASTOLIC BLOOD PRESSURE: 70 MMHG | SYSTOLIC BLOOD PRESSURE: 125 MMHG

## 2021-01-01 VITALS — WEIGHT: 178 LBS | BODY MASS INDEX: 28.73 KG/M2

## 2021-01-01 VITALS — WEIGHT: 180 LBS | BODY MASS INDEX: 29.05 KG/M2

## 2021-01-01 VITALS
TEMPERATURE: 97.9 F | SYSTOLIC BLOOD PRESSURE: 148 MMHG | WEIGHT: 180 LBS | HEART RATE: 62 BPM | SYSTOLIC BLOOD PRESSURE: 146 MMHG | DIASTOLIC BLOOD PRESSURE: 69 MMHG | HEART RATE: 60 BPM | OXYGEN SATURATION: 95 % | DIASTOLIC BLOOD PRESSURE: 68 MMHG | BODY MASS INDEX: 29.05 KG/M2

## 2021-01-01 VITALS
TEMPERATURE: 97.8 F | OXYGEN SATURATION: 98 % | HEART RATE: 60 BPM | DIASTOLIC BLOOD PRESSURE: 58 MMHG | BODY MASS INDEX: 27.44 KG/M2 | SYSTOLIC BLOOD PRESSURE: 110 MMHG | WEIGHT: 170 LBS

## 2021-01-01 VITALS
DIASTOLIC BLOOD PRESSURE: 82 MMHG | HEART RATE: 64 BPM | OXYGEN SATURATION: 95 % | BODY MASS INDEX: 29.38 KG/M2 | SYSTOLIC BLOOD PRESSURE: 160 MMHG | TEMPERATURE: 97.6 F | WEIGHT: 182 LBS

## 2021-01-01 VITALS — DIASTOLIC BLOOD PRESSURE: 78 MMHG | SYSTOLIC BLOOD PRESSURE: 132 MMHG

## 2021-01-01 VITALS — HEART RATE: 60 BPM | SYSTOLIC BLOOD PRESSURE: 114 MMHG | DIASTOLIC BLOOD PRESSURE: 70 MMHG

## 2021-01-01 VITALS
WEIGHT: 174 LBS | OXYGEN SATURATION: 95 % | HEART RATE: 62 BPM | HEIGHT: 66 IN | BODY MASS INDEX: 27.97 KG/M2 | TEMPERATURE: 97.9 F | DIASTOLIC BLOOD PRESSURE: 72 MMHG | SYSTOLIC BLOOD PRESSURE: 146 MMHG

## 2021-01-01 VITALS — SYSTOLIC BLOOD PRESSURE: 148 MMHG | DIASTOLIC BLOOD PRESSURE: 80 MMHG

## 2021-01-01 VITALS — SYSTOLIC BLOOD PRESSURE: 134 MMHG | DIASTOLIC BLOOD PRESSURE: 72 MMHG

## 2021-01-01 VITALS — DIASTOLIC BLOOD PRESSURE: 70 MMHG | SYSTOLIC BLOOD PRESSURE: 120 MMHG

## 2021-01-01 VITALS — BODY MASS INDEX: 28.61 KG/M2 | WEIGHT: 178 LBS | HEIGHT: 66 IN

## 2021-01-01 DIAGNOSIS — I10 ESSENTIAL (PRIMARY) HYPERTENSION: ICD-10-CM

## 2021-01-01 DIAGNOSIS — R60.9 EDEMA, UNSPECIFIED: ICD-10-CM

## 2021-01-01 DIAGNOSIS — I50.9 HEART FAILURE, UNSPECIFIED: ICD-10-CM

## 2021-01-01 DIAGNOSIS — Z51.81 ENCOUNTER FOR THERAPEUTIC DRUG LVL MONITORING: ICD-10-CM

## 2021-01-01 DIAGNOSIS — I35.0 NONRHEUMATIC AORTIC (VALVE) STENOSIS: ICD-10-CM

## 2021-01-01 DIAGNOSIS — Z79.01 LONG TERM (CURRENT) USE OF ANTICOAGULANTS: ICD-10-CM

## 2021-01-01 DIAGNOSIS — N18.9 CHRONIC KIDNEY DISEASE, UNSPECIFIED: ICD-10-CM

## 2021-01-01 DIAGNOSIS — N18.4 CHRONIC KIDNEY DISEASE, STAGE 4 (SEVERE): ICD-10-CM

## 2021-01-01 DIAGNOSIS — K62.5 HEMORRHAGE OF ANUS AND RECTUM: ICD-10-CM

## 2021-01-01 DIAGNOSIS — F32.9 MAJOR DEPRESSIVE DISORDER, SINGLE EPISODE, UNSPECIFIED: ICD-10-CM

## 2021-01-01 DIAGNOSIS — E11.9 TYPE 2 DIABETES MELLITUS W/OUT COMPLICATIONS: ICD-10-CM

## 2021-01-01 DIAGNOSIS — I25.5 ISCHEMIC CARDIOMYOPATHY: ICD-10-CM

## 2021-01-01 DIAGNOSIS — Z95.5 PRESENCE OF CORONARY ANGIOPLASTY IMPLANT AND GRAFT: ICD-10-CM

## 2021-01-01 DIAGNOSIS — D63.8 ANEMIA IN OTHER CHRONIC DISEASES CLASSIFIED ELSEWHERE: ICD-10-CM

## 2021-01-01 DIAGNOSIS — I48.0 PAROXYSMAL ATRIAL FIBRILLATION: ICD-10-CM

## 2021-01-01 DIAGNOSIS — J44.9 CHRONIC OBSTRUCTIVE PULMONARY DISEASE, UNSPECIFIED: ICD-10-CM

## 2021-01-01 DIAGNOSIS — I95.9 HYPOTENSION, UNSPECIFIED: ICD-10-CM

## 2021-01-01 LAB
ALBUMIN SERPL ELPH-MCNC: 3.8 G/DL
ALBUMIN SERPL ELPH-MCNC: 3.9 G/DL
ALBUMIN SERPL ELPH-MCNC: 4 G/DL
ALP BLD-CCNC: 50 U/L
ALP BLD-CCNC: 62 U/L
ALP BLD-CCNC: 65 U/L
ALT SERPL-CCNC: 16 U/L
ALT SERPL-CCNC: 18 U/L
ALT SERPL-CCNC: 18 U/L
ANION GAP SERPL CALC-SCNC: 11 MMOL/L
ANION GAP SERPL CALC-SCNC: 11 MMOL/L
ANION GAP SERPL CALC-SCNC: 12 MMOL/L
ANION GAP SERPL CALC-SCNC: 13 MMOL/L
ANION GAP SERPL CALC-SCNC: 16 MMOL/L
ANION GAP SERPL CALC-SCNC: 18 MMOL/L
ANION GAP SERPL CALC-SCNC: 9 MMOL/L
AST SERPL-CCNC: 14 U/L
AST SERPL-CCNC: 15 U/L
AST SERPL-CCNC: 16 U/L
BASOPHILS # BLD AUTO: 0 K/UL
BASOPHILS # BLD AUTO: 0 K/UL
BASOPHILS # BLD AUTO: 0.01 K/UL
BASOPHILS # BLD AUTO: 0.02 K/UL
BASOPHILS NFR BLD AUTO: 0 %
BASOPHILS NFR BLD AUTO: 0 %
BASOPHILS NFR BLD AUTO: 0.2 %
BASOPHILS NFR BLD AUTO: 0.3 %
BILIRUB SERPL-MCNC: 0.2 MG/DL
BILIRUB SERPL-MCNC: 0.3 MG/DL
BILIRUB SERPL-MCNC: 0.4 MG/DL
BUN SERPL-MCNC: 62 MG/DL
BUN SERPL-MCNC: 65 MG/DL
BUN SERPL-MCNC: 68 MG/DL
BUN SERPL-MCNC: 68 MG/DL
BUN SERPL-MCNC: 70 MG/DL
BUN SERPL-MCNC: 70 MG/DL
BUN SERPL-MCNC: 73 MG/DL
BUN SERPL-MCNC: 74 MG/DL
BUN SERPL-MCNC: 81 MG/DL
CALCIUM SERPL-MCNC: 8.7 MG/DL
CALCIUM SERPL-MCNC: 8.8 MG/DL
CALCIUM SERPL-MCNC: 9.1 MG/DL
CALCIUM SERPL-MCNC: 9.1 MG/DL
CALCIUM SERPL-MCNC: 9.2 MG/DL
CALCIUM SERPL-MCNC: 9.3 MG/DL
CHLORIDE SERPL-SCNC: 102 MMOL/L
CHLORIDE SERPL-SCNC: 104 MMOL/L
CHLORIDE SERPL-SCNC: 104 MMOL/L
CHLORIDE SERPL-SCNC: 105 MMOL/L
CHLORIDE SERPL-SCNC: 106 MMOL/L
CHLORIDE SERPL-SCNC: 106 MMOL/L
CHLORIDE SERPL-SCNC: 107 MMOL/L
CHLORIDE SERPL-SCNC: 107 MMOL/L
CHLORIDE SERPL-SCNC: 108 MMOL/L
CK SERPL-CCNC: 33 U/L
CO2 SERPL-SCNC: 22 MMOL/L
CO2 SERPL-SCNC: 23 MMOL/L
CO2 SERPL-SCNC: 24 MMOL/L
CO2 SERPL-SCNC: 25 MMOL/L
CO2 SERPL-SCNC: 26 MMOL/L
CO2 SERPL-SCNC: 28 MMOL/L
CO2 SERPL-SCNC: 28 MMOL/L
CREAT SERPL-MCNC: 2.43 MG/DL
CREAT SERPL-MCNC: 2.46 MG/DL
CREAT SERPL-MCNC: 2.46 MG/DL
CREAT SERPL-MCNC: 2.55 MG/DL
CREAT SERPL-MCNC: 2.61 MG/DL
CREAT SERPL-MCNC: 2.63 MG/DL
CREAT SERPL-MCNC: 2.78 MG/DL
CREAT SERPL-MCNC: 2.85 MG/DL
CREAT SERPL-MCNC: 3.26 MG/DL
EOSINOPHIL # BLD AUTO: 0.01 K/UL
EOSINOPHIL # BLD AUTO: 0.09 K/UL
EOSINOPHIL # BLD AUTO: 0.15 K/UL
EOSINOPHIL # BLD AUTO: 0.3 K/UL
EOSINOPHIL NFR BLD AUTO: 0.2 %
EOSINOPHIL NFR BLD AUTO: 1.6 %
EOSINOPHIL NFR BLD AUTO: 2.7 %
EOSINOPHIL NFR BLD AUTO: 5 %
FERRITIN SERPL-MCNC: 101 NG/ML
GLUCOSE SERPL-MCNC: 116 MG/DL
GLUCOSE SERPL-MCNC: 123 MG/DL
GLUCOSE SERPL-MCNC: 125 MG/DL
GLUCOSE SERPL-MCNC: 126 MG/DL
GLUCOSE SERPL-MCNC: 135 MG/DL
GLUCOSE SERPL-MCNC: 136 MG/DL
GLUCOSE SERPL-MCNC: 173 MG/DL
GLUCOSE SERPL-MCNC: 241 MG/DL
GLUCOSE SERPL-MCNC: 99 MG/DL
HCT VFR BLD CALC: 31.4 %
HCT VFR BLD CALC: 32.3 %
HCT VFR BLD CALC: 34.2 %
HCT VFR BLD CALC: 36.7 %
HGB BLD-MCNC: 10.1 G/DL
HGB BLD-MCNC: 10.3 G/DL
HGB BLD-MCNC: 11.4 G/DL
HGB BLD-MCNC: 9.9 G/DL
IMM GRANULOCYTES NFR BLD AUTO: 0.4 %
IMM GRANULOCYTES NFR BLD AUTO: 0.5 %
IMM GRANULOCYTES NFR BLD AUTO: 0.5 %
INR PPP: 1.3 RATIO
INR PPP: 1.4 RATIO
INR PPP: 1.5 RATIO
INR PPP: 1.6 RATIO
INR PPP: 2.7 RATIO
INR PPP: 2.9 RATIO
INR PPP: 3 RATIO
INR PPP: 5 RATIO
INR PPP: 6.4 RATIO
IRON SATN MFR SERPL: 15 %
IRON SERPL-MCNC: 40 UG/DL
LYMPHOCYTES # BLD AUTO: 0.22 K/UL
LYMPHOCYTES # BLD AUTO: 0.34 K/UL
LYMPHOCYTES # BLD AUTO: 0.38 K/UL
LYMPHOCYTES # BLD AUTO: 0.8 K/UL
LYMPHOCYTES NFR BLD AUTO: 13.3 %
LYMPHOCYTES NFR BLD AUTO: 3.7 %
LYMPHOCYTES NFR BLD AUTO: 6.2 %
LYMPHOCYTES NFR BLD AUTO: 6.8 %
MAN DIFF?: NORMAL
MCHC RBC-ENTMCNC: 29.6 PG
MCHC RBC-ENTMCNC: 30.1 GM/DL
MCHC RBC-ENTMCNC: 30.3 PG
MCHC RBC-ENTMCNC: 30.5 PG
MCHC RBC-ENTMCNC: 31.1 GM/DL
MCHC RBC-ENTMCNC: 31.3 GM/DL
MCHC RBC-ENTMCNC: 31.5 GM/DL
MCHC RBC-ENTMCNC: 31.6 PG
MCV RBC AUTO: 100.3 FL
MCV RBC AUTO: 101.2 FL
MCV RBC AUTO: 95.3 FL
MCV RBC AUTO: 97 FL
MONOCYTES # BLD AUTO: 0.19 K/UL
MONOCYTES # BLD AUTO: 0.19 K/UL
MONOCYTES # BLD AUTO: 0.25 K/UL
MONOCYTES # BLD AUTO: 0.49 K/UL
MONOCYTES NFR BLD AUTO: 3.2 %
MONOCYTES NFR BLD AUTO: 3.4 %
MONOCYTES NFR BLD AUTO: 4.5 %
MONOCYTES NFR BLD AUTO: 8.1 %
NEUTROPHILS # BLD AUTO: 4.39 K/UL
NEUTROPHILS # BLD AUTO: 4.79 K/UL
NEUTROPHILS # BLD AUTO: 4.91 K/UL
NEUTROPHILS # BLD AUTO: 5.53 K/UL
NEUTROPHILS NFR BLD AUTO: 72.8 %
NEUTROPHILS NFR BLD AUTO: 86.6 %
NEUTROPHILS NFR BLD AUTO: 87.6 %
NEUTROPHILS NFR BLD AUTO: 92.4 %
NT-PROBNP SERPL-MCNC: 2776 PG/ML
NT-PROBNP SERPL-MCNC: 3233 PG/ML
NT-PROBNP SERPL-MCNC: 3479 PG/ML
NT-PROBNP SERPL-MCNC: 3656 PG/ML
NT-PROBNP SERPL-MCNC: 4042 PG/ML
NT-PROBNP SERPL-MCNC: 4081 PG/ML
NT-PROBNP SERPL-MCNC: 4671 PG/ML
NT-PROBNP SERPL-MCNC: 5510 PG/ML
NT-PROBNP SERPL-MCNC: 8361 PG/ML
PLATELET # BLD AUTO: 141 K/UL
PLATELET # BLD AUTO: 158 K/UL
PLATELET # BLD AUTO: 171 K/UL
PLATELET # BLD AUTO: 176 K/UL
POTASSIUM SERPL-SCNC: 4.8 MMOL/L
POTASSIUM SERPL-SCNC: 5 MMOL/L
POTASSIUM SERPL-SCNC: 5.1 MMOL/L
POTASSIUM SERPL-SCNC: 5.2 MMOL/L
POTASSIUM SERPL-SCNC: 5.2 MMOL/L
POTASSIUM SERPL-SCNC: 5.4 MMOL/L
POTASSIUM SERPL-SCNC: 5.4 MMOL/L
POTASSIUM SERPL-SCNC: 5.6 MMOL/L
POTASSIUM SERPL-SCNC: 5.6 MMOL/L
PROT SERPL-MCNC: 5.7 G/DL
PROT SERPL-MCNC: 5.8 G/DL
PROT SERPL-MCNC: 5.9 G/DL
RBC # BLD: 3.13 M/UL
RBC # BLD: 3.33 M/UL
RBC # BLD: 3.38 M/UL
RBC # BLD: 3.85 M/UL
RBC # FLD: 14 %
RBC # FLD: 14.3 %
RBC # FLD: 14.3 %
RBC # FLD: 14.7 %
SODIUM SERPL-SCNC: 140 MMOL/L
SODIUM SERPL-SCNC: 142 MMOL/L
SODIUM SERPL-SCNC: 142 MMOL/L
SODIUM SERPL-SCNC: 143 MMOL/L
SODIUM SERPL-SCNC: 143 MMOL/L
SODIUM SERPL-SCNC: 145 MMOL/L
TIBC SERPL-MCNC: 257 UG/DL
UIBC SERPL-MCNC: 217 UG/DL
WBC # FLD AUTO: 5.53 K/UL
WBC # FLD AUTO: 5.6 K/UL
WBC # FLD AUTO: 5.98 K/UL
WBC # FLD AUTO: 6.03 K/UL

## 2021-01-01 PROCEDURE — 99214 OFFICE O/P EST MOD 30 MIN: CPT | Mod: 25

## 2021-01-01 PROCEDURE — 85610 PROTHROMBIN TIME: CPT | Mod: QW

## 2021-01-01 PROCEDURE — 93000 ELECTROCARDIOGRAM COMPLETE: CPT

## 2021-01-01 PROCEDURE — 36415 COLL VENOUS BLD VENIPUNCTURE: CPT

## 2021-01-01 PROCEDURE — 82270 OCCULT BLOOD FECES: CPT

## 2021-01-01 PROCEDURE — 99215 OFFICE O/P EST HI 40 MIN: CPT | Mod: 25

## 2021-01-01 PROCEDURE — 99214 OFFICE O/P EST MOD 30 MIN: CPT

## 2021-01-01 RX ORDER — WARFARIN 5 MG/1
5 TABLET ORAL
Qty: 90 | Refills: 3 | Status: ACTIVE | COMMUNITY
Start: 2021-01-01

## 2021-01-01 RX ORDER — DOXAZOSIN 8 MG/1
8 TABLET ORAL DAILY
Qty: 90 | Refills: 3 | Status: ACTIVE | COMMUNITY
Start: 2021-01-01 | End: 1900-01-01

## 2021-01-01 RX ORDER — AMLODIPINE BESYLATE 5 MG/1
5 TABLET ORAL
Qty: 90 | Refills: 3 | Status: ACTIVE | COMMUNITY
Start: 2020-01-01 | End: 1900-01-01

## 2021-01-01 RX ORDER — CLOPIDOGREL BISULFATE 75 MG/1
75 TABLET, FILM COATED ORAL
Qty: 90 | Refills: 3 | Status: ACTIVE | COMMUNITY
Start: 1900-01-01 | End: 1900-01-01

## 2021-01-01 RX ORDER — HYDRALAZINE HYDROCHLORIDE 50 MG/1
50 TABLET ORAL TWICE DAILY
Qty: 180 | Refills: 3 | Status: ACTIVE | COMMUNITY
Start: 1900-01-01 | End: 1900-01-01

## 2021-01-01 RX ORDER — WARFARIN 5 MG/1
5 TABLET ORAL
Qty: 90 | Refills: 3 | Status: COMPLETED | COMMUNITY
Start: 2019-04-03 | End: 2021-01-01

## 2021-01-01 RX ORDER — WARFARIN 5 MG/1
5 TABLET ORAL
Qty: 90 | Refills: 3 | Status: COMPLETED | COMMUNITY
Start: 2020-04-16 | End: 2021-01-01

## 2021-01-01 RX ORDER — RIVAROXABAN 15 MG/1
15 TABLET, FILM COATED ORAL
Qty: 90 | Refills: 3 | Status: DISCONTINUED | COMMUNITY
Start: 2021-01-01 | End: 2021-01-01

## 2021-01-01 RX ORDER — SACUBITRIL AND VALSARTAN 24; 26 MG/1; MG/1
24-26 TABLET, FILM COATED ORAL TWICE DAILY
Qty: 180 | Refills: 3 | Status: ACTIVE | COMMUNITY
Start: 2020-01-01 | End: 1900-01-01

## 2021-01-01 RX ORDER — WARFARIN 5 MG/1
5 TABLET ORAL
Refills: 0 | Status: COMPLETED | COMMUNITY
End: 2021-01-01

## 2021-01-01 RX ORDER — TORSEMIDE 20 MG/1
20 TABLET ORAL
Qty: 180 | Refills: 3 | Status: ACTIVE | COMMUNITY
Start: 2020-01-01

## 2021-01-01 RX ORDER — MONTELUKAST 10 MG/1
10 TABLET, FILM COATED ORAL
Qty: 90 | Refills: 3 | Status: ACTIVE | COMMUNITY
Start: 1900-01-01 | End: 1900-01-01

## 2021-01-04 PROBLEM — R60.9 EDEMA: Status: ACTIVE | Noted: 2018-11-21

## 2021-01-04 NOTE — PLAN
[FreeTextEntry1] : warfarin one whole pill daily and recheck in 7 days\par increase hydralazine now to 50 BID\par check renal fxn and K\par must see renal consult, again referred\par ? increase Entresto\par try one teaspoon daily (lower dose) Kayexalate\par

## 2021-01-04 NOTE — PHYSICAL EXAM
[No Acute Distress] : no acute distress [Well Nourished] : well nourished [Well Developed] : well developed [Well-Appearing] : well-appearing [Normal Sclera/Conjunctiva] : normal sclera/conjunctiva [Normal Outer Ear/Nose] : the outer ears and nose were normal in appearance [No JVD] : no jugular venous distention [No Lymphadenopathy] : no lymphadenopathy [Supple] : supple [No Respiratory Distress] : no respiratory distress  [Clear to Auscultation] : lungs were clear to auscultation bilaterally [Normal Rate] : normal [Heart Rate ___] : [unfilled] bpm [Premature Beats] : regular with premature beats [Normal S1] : normal S1 [Normal S2] : normal S2 [Distant] : the heart sounds were distant [I] : a grade 1 [No Carotid Bruits] : no carotid bruits [No Abdominal Bruit] : a ~M bruit was not heard ~T in the abdomen [No Varicosities] : no varicosities [Pedal Pulses Present] : the pedal pulses are present [No Palpable Aorta] : no palpable aorta [No Extremity Clubbing/Cyanosis] : no extremity clubbing/cyanosis [___ +] : bilateral [unfilled]U+ pretibial pitting edema [Soft] : abdomen soft [Non Tender] : non-tender [Non-distended] : non-distended [No Masses] : no abdominal mass palpated [No HSM] : no HSM [Normal Bowel Sounds] : normal bowel sounds [Normal Posterior Cervical Nodes] : no posterior cervical lymphadenopathy [Normal Anterior Cervical Nodes] : no anterior cervical lymphadenopathy [No CVA Tenderness] : no CVA  tenderness [No Spinal Tenderness] : no spinal tenderness [No Joint Swelling] : no joint swelling [Grossly Normal Strength/Tone] : grossly normal strength/tone [No Rash] : no rash [Coordination Grossly Intact] : coordination grossly intact [No Focal Deficits] : no focal deficits [Normal Gait] : normal gait [Deep Tendon Reflexes (DTR)] : deep tendon reflexes were 2+ and symmetric [Speech Grossly Normal] : speech grossly normal [Memory Grossly Normal] : memory grossly normal [Normal Affect] : the affect was normal [Alert and Oriented x3] : oriented to person, place, and time [Normal Insight/Judgement] : insight and judgment were intact [de-identified] : air movement good

## 2021-01-04 NOTE — ASSESSMENT
[FreeTextEntry1] : Clinically  did much better on Entresto now on 1/2 smallest dose\par weight down 7 pounds since last week with reduction in edema\par needs to see kidney specialist Delvis Ledesma last seen 2013\par INR too low not using warfarin correctly\par HTN not well ocntrolled

## 2021-01-04 NOTE — HISTORY OF PRESENT ILLNESS
[FreeTextEntry1] : here to follow ongoing conditions [de-identified] : upgrade of PPI to biventricular pacer/defibrillator [facility] 7/24/2020\par went in to AF after sotalol changed to metoprolol\par hospitalized 9/2 to 9/4/2020 for CHF exacerbation\par subsequently in ER for weakness due to dehydration 9/11/2020\par long history of COPD and asthma\par history of coronary artery disease and past myocardial infarction in April of 2018 had a stent to a large 90% circumflex lesion with an ejection fraction at that time estimated at 30%\par echocardiogram was done with an ejection fraction of 35-40% 12/23/2019, was 25 - 30 % on echo 5/2020\par Entresto was restared at lowest dose but after 10 days his potassium increased to 6.3 with his creatinine increasing to 2.91 from 2.15\par renal insufficiency had been stable\par saw CHF specialist Jarrod Hahn\par uses prednisone prn itching\par also saw Dr Kanner recently for echo guided PPI adjustment optimization 11/24/2020\par saw allergist and started Entresto in his office, no allergic reaction, had ? allergy to valsartan rash 2 years ago,\par patient had significant improvement in his CHF symptoms on Entresto\par Entresto was stopped on 12/19/2020 after bloods showed elevation in K and creatinine\par 7 pound weight gain and chf found last week Dr Rosa, torsemide increased\par INR low at 1.3 today, has not been taking it correctly\par cant tolerate Kayexalate, last used it over 1 week ago, due to explosive diarrhea\par did not schedule renal consult

## 2021-01-04 NOTE — REVIEW OF SYSTEMS
[Shortness Of Breath] : shortness of breath [Nocturia] : nocturia [Back Pain] : back pain [Itching] : itching [Insomnia] : insomnia [Depression] : depression [Negative] : Heme/Lymph [Fever] : no fever [Chills] : no chills [Hot Flashes] : no hot flashes [Night Sweats] : no night sweats [Recent Change In Weight] : ~T no recent weight change [Postnasal Drip] : no postnasal drip [Nasal Discharge] : no nasal discharge [Chest Pain] : no chest pain [Palpitations] : no palpitations [Claudication] : no  leg claudication [Lower Ext Edema] : no lower extremity edema [Orthopena] : no orthopnea [Paroxysmal Nocturnal Dyspnea] : no paroxysmal nocturnal dyspnea [Wheezing] : no wheezing [Cough] : no cough [Dyspnea on Exertion] : not dyspnea on exertion [Abdominal Pain] : no abdominal pain [Nausea] : no nausea [Constipation] : no constipation [Diarrhea] : no diarrhea [Vomiting] : no vomiting [Heartburn] : no heartburn [Melena] : no melena [Dysuria] : no dysuria [Incontinence] : no incontinence [Hesitancy] : no hesitancy [Hematuria] : no hematuria [Frequency] : no frequency [Impotence] : no impotency [Poor Libido] : libido not poor [Joint Stiffness] : no joint stiffness [Mole Changes] : no mole changes [Nail Changes] : no nail changes [Hair Changes] : no hair changes [Skin Rash] : no skin rash [Headache] : no headache [Dizziness] : no dizziness [Fainting] : no fainting [Confusion] : no confusion [Memory Loss] : no memory loss [Suicidal] : not suicidal [Anxiety] : no anxiety [de-identified] : better

## 2021-01-11 PROBLEM — Z51.81 THERAPEUTIC DRUG MONITORING: Status: ACTIVE | Noted: 2018-11-21

## 2021-01-11 NOTE — REVIEW OF SYSTEMS
[Fever] : no fever [Chills] : no chills [Hot Flashes] : no hot flashes [Night Sweats] : no night sweats [Recent Change In Weight] : ~T no recent weight change [Postnasal Drip] : no postnasal drip [Nasal Discharge] : no nasal discharge [Chest Pain] : no chest pain [Palpitations] : no palpitations [Claudication] : no  leg claudication [Lower Ext Edema] : no lower extremity edema [Orthopena] : no orthopnea [Paroxysmal Nocturnal Dyspnea] : no paroxysmal nocturnal dyspnea [Shortness Of Breath] : no shortness of breath [Wheezing] : no wheezing [Cough] : no cough [Dyspnea on Exertion] : not dyspnea on exertion [Abdominal Pain] : no abdominal pain [Nausea] : no nausea [Constipation] : no constipation [Diarrhea] : no diarrhea [Vomiting] : no vomiting [Heartburn] : no heartburn [Melena] : no melena [Dysuria] : no dysuria [Hesitancy] : no hesitancy [Incontinence] : no incontinence [Nocturia] : nocturia [Hematuria] : no hematuria [Frequency] : no frequency [Impotence] : no impotency [Poor Libido] : libido not poor [Joint Stiffness] : no joint stiffness [Back Pain] : back pain [Itching] : itching [Mole Changes] : no mole changes [Nail Changes] : no nail changes [Hair Changes] : no hair changes [Skin Rash] : no skin rash [Headache] : no headache [Dizziness] : no dizziness [Fainting] : no fainting [Confusion] : no confusion [Memory Loss] : no memory loss [Suicidal] : not suicidal [Insomnia] : insomnia [Anxiety] : no anxiety [Depression] : depression [Negative] : Heme/Lymph [de-identified] : better when uses prednisone

## 2021-01-11 NOTE — PHYSICAL EXAM
[Well Nourished] : well nourished [Well Developed] : well developed [Well-Appearing] : well-appearing [Normal Sclera/Conjunctiva] : normal sclera/conjunctiva [Normal Outer Ear/Nose] : the outer ears and nose were normal in appearance [No JVD] : no jugular venous distention [No Lymphadenopathy] : no lymphadenopathy [Supple] : supple [No Respiratory Distress] : no respiratory distress  [Clear to Auscultation] : lungs were clear to auscultation bilaterally [Normal Rate] : normal [Heart Rate ___] : [unfilled] bpm [Premature Beats] : regular with premature beats [Normal S1] : normal S1 [Normal S2] : normal S2 [Distant] : the heart sounds were distant [I] : a grade 1 [No Carotid Bruits] : no carotid bruits [No Abdominal Bruit] : a ~M bruit was not heard ~T in the abdomen [No Varicosities] : no varicosities [Pedal Pulses Present] : the pedal pulses are present [No Palpable Aorta] : no palpable aorta [No Extremity Clubbing/Cyanosis] : no extremity clubbing/cyanosis [___ +] : bilateral [unfilled]U+ pretibial pitting edema [Soft] : abdomen soft [Non Tender] : non-tender [Non-distended] : non-distended [No Masses] : no abdominal mass palpated [No HSM] : no HSM [Normal Bowel Sounds] : normal bowel sounds [Normal Posterior Cervical Nodes] : no posterior cervical lymphadenopathy [Normal Anterior Cervical Nodes] : no anterior cervical lymphadenopathy [No CVA Tenderness] : no CVA  tenderness [No Spinal Tenderness] : no spinal tenderness [No Joint Swelling] : no joint swelling [Grossly Normal Strength/Tone] : grossly normal strength/tone [No Rash] : no rash [Coordination Grossly Intact] : coordination grossly intact [No Focal Deficits] : no focal deficits [Normal Gait] : normal gait [Deep Tendon Reflexes (DTR)] : deep tendon reflexes were 2+ and symmetric [Speech Grossly Normal] : speech grossly normal [Memory Grossly Normal] : memory grossly normal [Normal Affect] : the affect was normal [Alert and Oriented x3] : oriented to person, place, and time [Normal Insight/Judgement] : insight and judgment were intact [No Acute Distress] : no acute distress [de-identified] : air movement good

## 2021-01-11 NOTE — ASSESSMENT
[FreeTextEntry1] : HFReF\par did much better on Entresto now on 1/2 smallest dose doing better\par edema less, weight down\par to see kidney specialist Delvis Ledesma last seen 2013\par INR good\par HTN better control

## 2021-01-11 NOTE — PLAN
[FreeTextEntry1] : warfarin one whole pill daily and recheck in 7 days\par stay on increased hydralazine now 50 BID\par check renal fxn and K\par to see renal consult\par ? increase Entresto\par for now stay on one teaspoon daily (lower dose) Kayexalate\par

## 2021-01-11 NOTE — HISTORY OF PRESENT ILLNESS
[FreeTextEntry1] : here to follow ongoing conditions [de-identified] : upgrade of PPI to biventricular pacer/defibrillator [facility] 7/24/2020\par went in to AF after sotalol changed to metoprolol\par hospitalized 9/2 to 9/4/2020 for CHF exacerbation\par subsequently in ER for weakness due to dehydration 9/11/2020\par long history of COPD and asthma\par history of coronary artery disease and past myocardial infarction in April of 2018 had a stent to a large 90% circumflex lesion with an ejection fraction at that time estimated at 30%\par echocardiogram was done with an ejection fraction of 35-40% 12/23/2019, was 25 - 30 % on echo 5/2020\par Entresto was restared at lowest dose but after 10 days his potassium increased to 6.3 with his creatinine increasing to 2.91 from 2.15\par renal insufficiency had been stable\par saw CHF specialist Jarrod Hahn\par uses prednisone prn itching, more last few days\par also saw Dr Kanner recently for echo guided PPI adjustment optimization 11/24/2020\par saw allergist and started Entresto in his office, no allergic reaction, had ? allergy to valsartan with neck rash 2 years ago,\par patient had significant improvement in his CHF symptoms on Entresto and had no allergic reaction\par Entresto was stopped on 12/19/2020 after bloods showed elevation in K and creatinine\par 7 pound weight gain and chf found last week Dr Rosa, torsemide increased, weight down last week and again today\par INR low at 1.3 last week, back at 2.7 today\par tolerating 1 teaspoon of Kayexalate\par on 1/2 dose Entresto\par did schedule renal consult Dr Ledesma

## 2021-01-18 PROBLEM — F32.9 DEPRESSION: Status: ACTIVE | Noted: 2018-11-21

## 2021-01-18 NOTE — PHYSICAL EXAM
[Well Nourished] : well nourished [No Acute Distress] : no acute distress [Well Developed] : well developed [Well-Appearing] : well-appearing [Normal Sclera/Conjunctiva] : normal sclera/conjunctiva [Normal Outer Ear/Nose] : the outer ears and nose were normal in appearance [No JVD] : no jugular venous distention [No Lymphadenopathy] : no lymphadenopathy [Supple] : supple [No Respiratory Distress] : no respiratory distress  [Clear to Auscultation] : lungs were clear to auscultation bilaterally [Normal Rate] : normal [Heart Rate ___] : [unfilled] bpm [Premature Beats] : regular with premature beats [Normal S1] : normal S1 [Distant] : the heart sounds were distant [Normal S2] : normal S2 [I] : a grade 1 [No Carotid Bruits] : no carotid bruits [No Abdominal Bruit] : a ~M bruit was not heard ~T in the abdomen [No Varicosities] : no varicosities [Pedal Pulses Present] : the pedal pulses are present [No Palpable Aorta] : no palpable aorta [No Extremity Clubbing/Cyanosis] : no extremity clubbing/cyanosis [___ +] : bilateral [unfilled]U+ pretibial pitting edema [Soft] : abdomen soft [Non Tender] : non-tender [Non-distended] : non-distended [No Masses] : no abdominal mass palpated [No HSM] : no HSM [Normal Bowel Sounds] : normal bowel sounds [No Stool to Guaiac] : no stool to guaiac [Normal Sphincter Tone] : normal sphincter tone [No Mass] : no mass [Normal Posterior Cervical Nodes] : no posterior cervical lymphadenopathy [Normal Anterior Cervical Nodes] : no anterior cervical lymphadenopathy [No CVA Tenderness] : no CVA  tenderness [No Spinal Tenderness] : no spinal tenderness [No Joint Swelling] : no joint swelling [Grossly Normal Strength/Tone] : grossly normal strength/tone [No Rash] : no rash [Coordination Grossly Intact] : coordination grossly intact [No Focal Deficits] : no focal deficits [Normal Gait] : normal gait [Deep Tendon Reflexes (DTR)] : deep tendon reflexes were 2+ and symmetric [Speech Grossly Normal] : speech grossly normal [Memory Grossly Normal] : memory grossly normal [Normal Affect] : the affect was normal [Alert and Oriented x3] : oriented to person, place, and time [Normal Insight/Judgement] : insight and judgment were intact [Stool Occult Blood] : stool negative for occult blood [de-identified] : air movement good [FreeTextEntry1] : essentially normal

## 2021-01-18 NOTE — ASSESSMENT
[FreeTextEntry1] : HFReF\par does not feel improvement on higher entresto\par edema less, weight down\par to see kidney specialist Delvis Ledesma now rescheduled for 1/28/2021\par INR okj\par HTN better control\par stage 4 kidney disease

## 2021-01-18 NOTE — REVIEW OF SYSTEMS
[Nocturia] : nocturia [Back Pain] : back pain [Itching] : itching [Insomnia] : insomnia [Depression] : depression [Negative] : Heme/Lymph [Fever] : no fever [Chills] : no chills [Hot Flashes] : no hot flashes [Night Sweats] : no night sweats [Recent Change In Weight] : ~T no recent weight change [Postnasal Drip] : no postnasal drip [Nasal Discharge] : no nasal discharge [Chest Pain] : no chest pain [Palpitations] : no palpitations [Claudication] : no  leg claudication [Lower Ext Edema] : no lower extremity edema [Orthopena] : no orthopnea [Paroxysmal Nocturnal Dyspnea] : no paroxysmal nocturnal dyspnea [Shortness Of Breath] : no shortness of breath [Wheezing] : no wheezing [Cough] : no cough [Dyspnea on Exertion] : not dyspnea on exertion [Abdominal Pain] : no abdominal pain [Nausea] : no nausea [Constipation] : no constipation [Diarrhea] : no diarrhea [Vomiting] : no vomiting [Heartburn] : no heartburn [Melena] : no melena [Dysuria] : no dysuria [Incontinence] : no incontinence [Hesitancy] : no hesitancy [Hematuria] : no hematuria [Frequency] : no frequency [Poor Libido] : libido not poor [Impotence] : no impotency [Joint Stiffness] : no joint stiffness [Mole Changes] : no mole changes [Nail Changes] : no nail changes [Hair Changes] : no hair changes [Skin Rash] : no skin rash [Headache] : no headache [Dizziness] : no dizziness [Confusion] : no confusion [Fainting] : no fainting [Memory Loss] : no memory loss [Suicidal] : not suicidal [Anxiety] : no anxiety [de-identified] : better when uses prednisone

## 2021-01-18 NOTE — HISTORY OF PRESENT ILLNESS
[FreeTextEntry1] : here to follow ongoing conditions [de-identified] : upgrade of PPI to biventricular pacer/defibrillator [facility] 7/24/2020\par went in to AF after sotalol changed to metoprolol\par hospitalized 9/2 to 9/4/2020 for CHF exacerbation\par subsequently in ER for weakness due to dehydration 9/11/2020\par long history of COPD and asthma\par history of coronary artery disease and past myocardial infarction in April of 2018 had a stent to a large 90% circumflex lesion with an ejection fraction at that time estimated at 30%\par echocardiogram was done with an ejection fraction of 35-40% 12/23/2019, was 25 - 30 % on echo 5/2020\par Entresto was restared at lowest dose but after 10 days his potassium increased to 6.3 with his creatinine increasing to 2.91 from 2.15\par renal insufficiency had been stable\par saw CHF specialist Jarrod Hahn\par uses prednisone prn itching, 20 mg daily lately\par saw Dr Kanner recently for echo guided PPI adjustment optimization 11/24/2020\par saw allergist and started Entresto in his office, no allergic reaction, had ? allergy to valsartan with neck rash 2 years ago,\par patient had significant improvement in his CHF symptoms on Entresto and had no allergic reaction\par Entresto was stopped on 12/19/2020 after bloods showed elevation in K and creatinine\par 7 pound weight gain and chf found 12/30/20 with Dr Rosa, torsemide increased, weight down \par INR \par tolerating 1 teaspoon of Kayexalate\par on full dose Entresto now\par did not see renal consult Dr Ledesma, pt says he cancelled his appointment due to nosebleed, after he used tweezer to pull out nosehair\par no recurrence\par blood after BM yesterday evening, BM was normal and brown and well formed, no blood since then

## 2021-01-18 NOTE — PLAN
[FreeTextEntry1] : warfarin same\par same hydralazine now 50 BID\par check renal fxn and K\par must see renal consult\par same one teaspoon daily (lower dose) Kayexalate\par

## 2021-01-25 NOTE — PHYSICAL EXAM
[No Acute Distress] : no acute distress [Well Nourished] : well nourished [Well Developed] : well developed [Well-Appearing] : well-appearing [Normal Sclera/Conjunctiva] : normal sclera/conjunctiva [Normal Outer Ear/Nose] : the outer ears and nose were normal in appearance [No JVD] : no jugular venous distention [No Lymphadenopathy] : no lymphadenopathy [Supple] : supple [No Respiratory Distress] : no respiratory distress  [Clear to Auscultation] : lungs were clear to auscultation bilaterally [Normal Rate] : normal [Heart Rate ___] : [unfilled] bpm [Premature Beats] : regular with premature beats [Normal S1] : normal S1 [Normal S2] : normal S2 [Distant] : the heart sounds were distant [I] : a grade 1 [No Carotid Bruits] : no carotid bruits [No Abdominal Bruit] : a ~M bruit was not heard ~T in the abdomen [No Varicosities] : no varicosities [Pedal Pulses Present] : the pedal pulses are present [No Palpable Aorta] : no palpable aorta [No Extremity Clubbing/Cyanosis] : no extremity clubbing/cyanosis [___ +] : bilateral [unfilled]U+ pretibial pitting edema [Soft] : abdomen soft [Non Tender] : non-tender [Non-distended] : non-distended [No Masses] : no abdominal mass palpated [No HSM] : no HSM [Normal Bowel Sounds] : normal bowel sounds [Normal Posterior Cervical Nodes] : no posterior cervical lymphadenopathy [Normal Anterior Cervical Nodes] : no anterior cervical lymphadenopathy [No CVA Tenderness] : no CVA  tenderness [No Spinal Tenderness] : no spinal tenderness [No Joint Swelling] : no joint swelling [Grossly Normal Strength/Tone] : grossly normal strength/tone [No Rash] : no rash [Coordination Grossly Intact] : coordination grossly intact [No Focal Deficits] : no focal deficits [Normal Gait] : normal gait [Deep Tendon Reflexes (DTR)] : deep tendon reflexes were 2+ and symmetric [Speech Grossly Normal] : speech grossly normal [Memory Grossly Normal] : memory grossly normal [Normal Affect] : the affect was normal [Alert and Oriented x3] : oriented to person, place, and time [Normal Insight/Judgement] : insight and judgment were intact [de-identified] : air movement good

## 2021-01-25 NOTE — HISTORY OF PRESENT ILLNESS
[FreeTextEntry1] : here to follow ongoing conditions [de-identified] : upgrade of PPI to biventricular pacer/defibrillator [facility] 7/24/2020\par went in to AF after sotalol changed to metoprolol\par hospitalized 9/2 to 9/4/2020 for CHF exacerbation\par subsequently in ER for weakness due to dehydration 9/11/2020\par long history of COPD and asthma\par history of coronary artery disease and past myocardial infarction in April of 2018 had a stent to a large 90% circumflex lesion with an ejection fraction at that time estimated at 30%\par echocardiogram was done with an ejection fraction of 35-40% 12/23/2019, was 25 - 30 % on echo 5/2020\par Entresto was restarted at lowest dose but after 10 days his potassium increased to 6.3 with his creatinine increasing to 2.91 from 2.15\par renal insufficiency had been stable\par saw CHF specialist Jarrod Hahn\par uses prednisone prn itching, 20 mg daily lately\par saw Dr Kanner recently for echo guided PPI adjustment optimization 11/24/2020\par saw allergist and started Entresto in his office, no allergic reaction, had ? allergy to valsartan with neck rash 2 years ago,\par patient had significant improvement in his CHF symptoms on Entresto and had no allergic reaction\par Entresto was stopped on 12/19/2020 after bloods showed elevation in K and creatinine\par 7 pound weight gain and chf found 12/30/20 with Dr Rosa, torsemide increased, weight down \par INR 1.6 but mistakenly missed warfarin for 2 days\par tolerating 1 teaspoon of Kayexalate\par on full low dose Entresto now\par did not see renal consult Dr Ledesma yet, now tells me appointment is for 2/28/2021\par torsemide increased last week\par normal brown BM's without any blood

## 2021-01-25 NOTE — PLAN
[FreeTextEntry1] : increase warfarin to 5 mg every day\par if renal fxn and K ok then increase Entresto

## 2021-01-25 NOTE — REVIEW OF SYSTEMS
[Nocturia] : nocturia [Back Pain] : back pain [Itching] : itching [Insomnia] : insomnia [Depression] : depression [Negative] : Heme/Lymph [Fever] : no fever [Chills] : no chills [Hot Flashes] : no hot flashes [Night Sweats] : no night sweats [Recent Change In Weight] : ~T no recent weight change [Postnasal Drip] : no postnasal drip [Nasal Discharge] : no nasal discharge [Chest Pain] : no chest pain [Palpitations] : no palpitations [Claudication] : no  leg claudication [Lower Ext Edema] : no lower extremity edema [Orthopena] : no orthopnea [Paroxysmal Nocturnal Dyspnea] : no paroxysmal nocturnal dyspnea [Shortness Of Breath] : no shortness of breath [Wheezing] : no wheezing [Cough] : no cough [Dyspnea on Exertion] : not dyspnea on exertion [Abdominal Pain] : no abdominal pain [Nausea] : no nausea [Constipation] : no constipation [Diarrhea] : no diarrhea [Vomiting] : no vomiting [Heartburn] : no heartburn [Melena] : no melena [Dysuria] : no dysuria [Incontinence] : no incontinence [Hesitancy] : no hesitancy [Hematuria] : no hematuria [Frequency] : no frequency [Impotence] : no impotency [Poor Libido] : libido not poor [Joint Stiffness] : no joint stiffness [Mole Changes] : no mole changes [Nail Changes] : no nail changes [Hair Changes] : no hair changes [Headache] : no headache [Skin Rash] : no skin rash [Dizziness] : no dizziness [Fainting] : no fainting [Confusion] : no confusion [Memory Loss] : no memory loss [Suicidal] : not suicidal [Anxiety] : no anxiety [de-identified] : better when uses prednisone

## 2021-01-25 NOTE — ASSESSMENT
[FreeTextEntry1] : HFReF weight unchanged but ? edema less on exam\par to see kidney specialist Delvis Ledesma now rescheduled for 2/28/2021\par INR low but missed doses\par HTN non ideal control\par stage 4 kidney disease

## 2021-02-08 PROBLEM — N18.9 CHRONIC RENAL DISEASE: Status: ACTIVE | Noted: 2019-09-17

## 2021-02-08 NOTE — REVIEW OF SYSTEMS
[Nocturia] : nocturia [Back Pain] : back pain [Itching] : itching [Insomnia] : insomnia [Depression] : depression [Negative] : Heme/Lymph [Fever] : no fever [Chills] : no chills [Hot Flashes] : no hot flashes [Night Sweats] : no night sweats [Recent Change In Weight] : ~T no recent weight change [Postnasal Drip] : no postnasal drip [Nasal Discharge] : no nasal discharge [Chest Pain] : no chest pain [Claudication] : no  leg claudication [Palpitations] : no palpitations [Lower Ext Edema] : no lower extremity edema [Orthopena] : no orthopnea [Paroxysmal Nocturnal Dyspnea] : no paroxysmal nocturnal dyspnea [Shortness Of Breath] : no shortness of breath [Wheezing] : no wheezing [Cough] : no cough [Dyspnea on Exertion] : not dyspnea on exertion [Abdominal Pain] : no abdominal pain [Nausea] : no nausea [Constipation] : no constipation [Diarrhea] : no diarrhea [Vomiting] : no vomiting [Heartburn] : no heartburn [Melena] : no melena [Dysuria] : no dysuria [Incontinence] : no incontinence [Hesitancy] : no hesitancy [Hematuria] : no hematuria [Frequency] : no frequency [Impotence] : no impotency [Poor Libido] : libido not poor [Joint Stiffness] : no joint stiffness [Mole Changes] : no mole changes [Nail Changes] : no nail changes [Hair Changes] : no hair changes [Skin Rash] : no skin rash [Headache] : no headache [Dizziness] : no dizziness [Fainting] : no fainting [Memory Loss] : no memory loss [Confusion] : no confusion [Suicidal] : not suicidal [Anxiety] : no anxiety [FreeTextEntry4] : feels like cant clear throat [de-identified] : better when uses prednisone

## 2021-02-08 NOTE — HISTORY OF PRESENT ILLNESS
[FreeTextEntry1] : here to follow ongoing conditions [de-identified] : upgrade of PPI to biventricular pacer/defibrillator [facility] 7/24/2020\par went in to AF after sotalol changed to metoprolol\par hospitalized 9/2 to 9/4/2020 for CHF exacerbation\par subsequently in ER for weakness due to dehydration 9/11/2020\par long history of COPD and asthma\par history of coronary artery disease and past myocardial infarction in April of 2018 had a stent to a large 90% circumflex lesion with an ejection fraction at that time estimated at 30%\par echocardiogram was done with an ejection fraction of 35-40% 12/23/2019, was 25 - 30 % on echo 5/2020\par Entresto was restarted at lowest dose but after 10 days his potassium increased to 6.3 with his creatinine increasing to 2.91 from 2.15\par renal insufficiency had been stable\par saw CHF specialist Jarrod Hahn\par uses prednisone prn itching, 20 mg daily lately\par saw Dr Kanner recently for echo guided PPI adjustment optimization 11/24/2020\par saw allergist and started Entresto in his office, no allergic reaction, had ? allergy to valsartan with neck rash 2 years ago,\par patient had significant improvement in his CHF symptoms on Entresto and had no allergic reaction\par Entresto was stopped on 12/19/2020 after bloods showed elevation in K and creatinine\par tolerating 1 teaspoon of Kayexalate\par on full low dose Entresto now\par saw renal consult Dr Ledesma very depressed after seeing him, he will not go to see him anymore, declines any other renal consult\par torsemide increased recently\par normal brown BM's without any blood\par estimated creatinine clearance is 26\par wants to come off warfarin, wants only a once daily medication

## 2021-02-08 NOTE — PHYSICAL EXAM
[No Acute Distress] : no acute distress [Well Nourished] : well nourished [Well Developed] : well developed [Well-Appearing] : well-appearing [Normal Sclera/Conjunctiva] : normal sclera/conjunctiva [Normal Outer Ear/Nose] : the outer ears and nose were normal in appearance [No JVD] : no jugular venous distention [No Lymphadenopathy] : no lymphadenopathy [Supple] : supple [No Respiratory Distress] : no respiratory distress  [Clear to Auscultation] : lungs were clear to auscultation bilaterally [Normal Rate] : normal [Heart Rate ___] : [unfilled] bpm [Premature Beats] : regular with premature beats [Normal S1] : normal S1 [Normal S2] : normal S2 [Distant] : the heart sounds were distant [I] : a grade 1 [No Carotid Bruits] : no carotid bruits [No Abdominal Bruit] : a ~M bruit was not heard ~T in the abdomen [No Varicosities] : no varicosities [Pedal Pulses Present] : the pedal pulses are present [No Palpable Aorta] : no palpable aorta [No Extremity Clubbing/Cyanosis] : no extremity clubbing/cyanosis [___ +] : bilateral [unfilled]U+ pretibial pitting edema [Soft] : abdomen soft [Non Tender] : non-tender [Non-distended] : non-distended [No Masses] : no abdominal mass palpated [No HSM] : no HSM [Normal Bowel Sounds] : normal bowel sounds [Normal Posterior Cervical Nodes] : no posterior cervical lymphadenopathy [Normal Anterior Cervical Nodes] : no anterior cervical lymphadenopathy [No CVA Tenderness] : no CVA  tenderness [No Spinal Tenderness] : no spinal tenderness [No Joint Swelling] : no joint swelling [Grossly Normal Strength/Tone] : grossly normal strength/tone [No Rash] : no rash [Coordination Grossly Intact] : coordination grossly intact [No Focal Deficits] : no focal deficits [Normal Gait] : normal gait [Deep Tendon Reflexes (DTR)] : deep tendon reflexes were 2+ and symmetric [Speech Grossly Normal] : speech grossly normal [Memory Grossly Normal] : memory grossly normal [Normal Affect] : the affect was normal [Alert and Oriented x3] : oriented to person, place, and time [Normal Insight/Judgement] : insight and judgment were intact [de-identified] : air movement good

## 2021-02-08 NOTE — PLAN
[FreeTextEntry1] : stop warfarin\par start 15 mg Xarelto later this week\par same other meds pending bun creat and K\par check bloods

## 2021-02-08 NOTE — ASSESSMENT
[FreeTextEntry1] : HFReF edema less on exam\par INR 3.0 wants to come off warfarin for PAF\par HTN ideal control\par stage 4 kidney disease

## 2021-03-01 NOTE — HISTORY OF PRESENT ILLNESS
[FreeTextEntry1] : here to follow ongoing conditions [de-identified] : upgrade of PPI to biventricular pacer/defibrillator [facility] 7/24/2020\par went in to AF after sotalol changed to metoprolol\par hospitalized 9/2 to 9/4/2020 for CHF exacerbation\par subsequently in ER for weakness due to dehydration 9/11/2020\par long history of COPD and asthma\par history of coronary artery disease and past myocardial infarction in April of 2018 had a stent to a large 90% circumflex lesion with an ejection fraction at that time estimated at 30%\par echocardiogram was done with an ejection fraction of 35-40% 12/23/2019, was 25 - 30 % on echo 5/2020\par Entresto was restarted at lowest dose but after 10 days his potassium increased to 6.3 with his creatinine increasing to 2.91 from 2.15\par renal insufficiency had been stable\par saw CHF specialist Jarrod Hahn\par uses prednisone prn itching, 20 mg daily lately\par saw Dr Kanner recently for echo guided PPI adjustment optimization 11/24/2020\par saw allergist and started Entresto in his office, no allergic reaction, had ? allergy to valsartan with neck rash 2 years ago,\par patient had significant improvement in his CHF symptoms on Entresto and had no allergic reaction\par Entresto was stopped on 12/19/2020 after bloods showed elevation in K and creatinine\par no longer tolerating 1 teaspoon of Kayexalate\par on full low dose Entresto now\par saw renal consult Dr Ledesma very depressed after seeing him, he will not go to see him anymore, declines any other renal consult\par torsemide increased recently\par normal brown BM's without any blood\par wanted to come off warfarin, cost too high, now still on warfarin, now requesting Xarelto again

## 2021-03-01 NOTE — PHYSICAL EXAM
[No Acute Distress] : no acute distress [Well Nourished] : well nourished [Well Developed] : well developed [Well-Appearing] : well-appearing [Normal Sclera/Conjunctiva] : normal sclera/conjunctiva [Normal Outer Ear/Nose] : the outer ears and nose were normal in appearance [No JVD] : no jugular venous distention [No Lymphadenopathy] : no lymphadenopathy [Supple] : supple [No Respiratory Distress] : no respiratory distress  [Clear to Auscultation] : lungs were clear to auscultation bilaterally [Normal Rate] : normal [Heart Rate ___] : [unfilled] bpm [Premature Beats] : regular with premature beats [Normal S1] : normal S1 [Normal S2] : normal S2 [Distant] : the heart sounds were distant [I] : a grade 1 [No Carotid Bruits] : no carotid bruits [No Abdominal Bruit] : a ~M bruit was not heard ~T in the abdomen [No Varicosities] : no varicosities [Pedal Pulses Present] : the pedal pulses are present [No Palpable Aorta] : no palpable aorta [No Extremity Clubbing/Cyanosis] : no extremity clubbing/cyanosis [___ +] : bilateral [unfilled]U+ pretibial pitting edema [Soft] : abdomen soft [Non Tender] : non-tender [Non-distended] : non-distended [No Masses] : no abdominal mass palpated [No HSM] : no HSM [Normal Bowel Sounds] : normal bowel sounds [Normal Posterior Cervical Nodes] : no posterior cervical lymphadenopathy [Normal Anterior Cervical Nodes] : no anterior cervical lymphadenopathy [No CVA Tenderness] : no CVA  tenderness [No Spinal Tenderness] : no spinal tenderness [No Joint Swelling] : no joint swelling [Grossly Normal Strength/Tone] : grossly normal strength/tone [No Rash] : no rash [Coordination Grossly Intact] : coordination grossly intact [No Focal Deficits] : no focal deficits [Normal Gait] : normal gait [Deep Tendon Reflexes (DTR)] : deep tendon reflexes were 2+ and symmetric [Speech Grossly Normal] : speech grossly normal [Memory Grossly Normal] : memory grossly normal [Normal Affect] : the affect was normal [Alert and Oriented x3] : oriented to person, place, and time [Normal Insight/Judgement] : insight and judgment were intact [de-identified] : air movement good

## 2021-03-01 NOTE — ASSESSMENT
[FreeTextEntry1] : HFReF edema less on exam, lungs clear\par INR too high\par HTN ideal control\par stage 4 kidney disease

## 2021-03-05 NOTE — HISTORY OF PRESENT ILLNESS
[FreeTextEntry1] : here to follow ongoing conditions [de-identified] : upgrade of PPI to biventricular pacer/defibrillator [facility] 7/24/2020\par went in to AF after sotalol changed to metoprolol\par hospitalized 9/2 to 9/4/2020 for CHF exacerbation\par subsequently in ER for weakness due to dehydration 9/11/2020\par long history of COPD and asthma\par history of coronary artery disease and past myocardial infarction in April of 2018 had a stent to a large 90% circumflex lesion with an ejection fraction at that time estimated at 30%\par echocardiogram was done with an ejection fraction of 35-40% 12/23/2019, was 25 - 30 % on echo 5/2020\par Entresto was restarted at lowest dose but after 10 days his potassium increased to 6.3 with his creatinine increasing to 2.91 from 2.15\par renal insufficiency had been stable\par saw CHF specialist Jarrod Hahn\par uses prednisone prn itching, 20 mg daily lately\par saw Dr Kanner recently for echo guided PPI adjustment optimization 11/24/2020\par saw allergist and started Entresto in his office, no allergic reaction, had ? allergy to valsartan with neck rash 2 years ago,\par patient had significant improvement in his CHF symptoms on Entresto and had no allergic reaction\par Entresto was stopped on 12/19/2020 after bloods showed elevation in K and creatinine\par no longer tolerating 1 teaspoon of Kayexalate\par on full low dose Entresto now\par saw renal consult Dr Ledesma very depressed after seeing him, he will not go to see him anymore, declines any other renal consult\par torsemide increased recently\par normal brown BM's without any blood\par wanted to come off warfarin, cost too high, was still on warfarin, now requesting Xarelto again\par off warfarin, INR was 6.4 on 3/1/2021, BNP 3656\par INR down to 1.5 today

## 2021-03-05 NOTE — PLAN
[FreeTextEntry1] : May switch to Xarelto\par Continue other heart failure medications and blood pressure medications\par We'll recheck in a few weeks

## 2021-04-02 PROBLEM — I35.0 AORTIC STENOSIS: Status: ACTIVE | Noted: 2019-12-23

## 2021-04-02 NOTE — ASSESSMENT
[FreeTextEntry1] : HFReF\par some AS not severe last echo sounds tighter, last echo 12/2020 with PPI adjustment\par HTN ideal control\par stage 4 kidney disease\par thinking of moving to [place]

## 2021-04-02 NOTE — PHYSICAL EXAM
[No Acute Distress] : no acute distress [Well Nourished] : well nourished [Well Developed] : well developed [Well-Appearing] : well-appearing [Normal Sclera/Conjunctiva] : normal sclera/conjunctiva [Normal Outer Ear/Nose] : the outer ears and nose were normal in appearance [No JVD] : no jugular venous distention [No Lymphadenopathy] : no lymphadenopathy [Supple] : supple [No Respiratory Distress] : no respiratory distress  [Clear to Auscultation] : lungs were clear to auscultation bilaterally [Normal Rate] : normal [Heart Rate ___] : [unfilled] bpm [Premature Beats] : regular with premature beats [Normal S1] : normal S1 [A2 Diminished] : had a diminished A2 [Distant] : the heart sounds were distant [I] : a grade 1 [No Carotid Bruits] : no carotid bruits [No Abdominal Bruit] : a ~M bruit was not heard ~T in the abdomen [No Varicosities] : no varicosities [Pedal Pulses Present] : the pedal pulses are present [No Palpable Aorta] : no palpable aorta [No Extremity Clubbing/Cyanosis] : no extremity clubbing/cyanosis [___ +] : bilateral [unfilled]U+ pretibial pitting edema [Soft] : abdomen soft [Non Tender] : non-tender [Non-distended] : non-distended [No Masses] : no abdominal mass palpated [No HSM] : no HSM [Normal Bowel Sounds] : normal bowel sounds [Normal Posterior Cervical Nodes] : no posterior cervical lymphadenopathy [Normal Anterior Cervical Nodes] : no anterior cervical lymphadenopathy [No CVA Tenderness] : no CVA  tenderness [No Spinal Tenderness] : no spinal tenderness [No Joint Swelling] : no joint swelling [Grossly Normal Strength/Tone] : grossly normal strength/tone [No Rash] : no rash [Coordination Grossly Intact] : coordination grossly intact [No Focal Deficits] : no focal deficits [Normal Gait] : normal gait [Deep Tendon Reflexes (DTR)] : deep tendon reflexes were 2+ and symmetric [Speech Grossly Normal] : speech grossly normal [Memory Grossly Normal] : memory grossly normal [Normal Affect] : the affect was normal [Alert and Oriented x3] : oriented to person, place, and time [Normal Insight/Judgement] : insight and judgment were intact [Normal S2] : abnormal S2 [de-identified] : air movement good

## 2021-04-02 NOTE — REVIEW OF SYSTEMS
[Nocturia] : nocturia [Back Pain] : back pain [Itching] : itching [Insomnia] : insomnia [Depression] : depression [Negative] : Heme/Lymph [Fever] : no fever [Chills] : no chills [Hot Flashes] : no hot flashes [Night Sweats] : no night sweats [Postnasal Drip] : no postnasal drip [Recent Change In Weight] : ~T no recent weight change [Nasal Discharge] : no nasal discharge [Chest Pain] : no chest pain [Palpitations] : no palpitations [Claudication] : no  leg claudication [Lower Ext Edema] : no lower extremity edema [Orthopena] : no orthopnea [Paroxysmal Nocturnal Dyspnea] : no paroxysmal nocturnal dyspnea [Shortness Of Breath] : no shortness of breath [Wheezing] : no wheezing [Cough] : no cough [Dyspnea on Exertion] : dyspnea on exertion [Abdominal Pain] : no abdominal pain [Nausea] : no nausea [Constipation] : no constipation [Diarrhea] : no diarrhea [Vomiting] : no vomiting [Heartburn] : no heartburn [Melena] : no melena [Dysuria] : no dysuria [Incontinence] : no incontinence [Hesitancy] : no hesitancy [Hematuria] : no hematuria [Frequency] : no frequency [Impotence] : no impotency [Poor Libido] : libido not poor [Joint Stiffness] : no joint stiffness [Mole Changes] : no mole changes [Nail Changes] : no nail changes [Hair Changes] : no hair changes [Skin Rash] : no skin rash [Headache] : no headache [Dizziness] : no dizziness [Fainting] : no fainting [Confusion] : no confusion [Memory Loss] : no memory loss [Suicidal] : not suicidal [Anxiety] : no anxiety [de-identified] : better when uses prednisone

## 2021-04-09 PROBLEM — I10 HYPERTENSION: Status: ACTIVE | Noted: 2018-11-21

## 2021-04-09 PROBLEM — D63.8 ANEMIA OF CHRONIC DISEASE: Status: ACTIVE | Noted: 2021-01-01

## 2021-04-09 NOTE — PHYSICAL EXAM
[No Acute Distress] : no acute distress [Well Nourished] : well nourished [Well Developed] : well developed [Well-Appearing] : well-appearing [Normal Sclera/Conjunctiva] : normal sclera/conjunctiva [Normal Outer Ear/Nose] : the outer ears and nose were normal in appearance [No JVD] : no jugular venous distention [No Lymphadenopathy] : no lymphadenopathy [Supple] : supple [No Respiratory Distress] : no respiratory distress  [Clear to Auscultation] : lungs were clear to auscultation bilaterally [Normal Rate] : normal [Heart Rate ___] : [unfilled] bpm [Rhythm Regular] : regular [Normal S1] : normal S1 [A2 Diminished] : had a diminished A2 [Distant] : the heart sounds were distant [I] : a grade 1 [No Carotid Bruits] : no carotid bruits [No Abdominal Bruit] : a ~M bruit was not heard ~T in the abdomen [No Varicosities] : no varicosities [Pedal Pulses Present] : the pedal pulses are present [No Palpable Aorta] : no palpable aorta [No Extremity Clubbing/Cyanosis] : no extremity clubbing/cyanosis [___ +] : bilateral [unfilled]U+ pretibial pitting edema [Soft] : abdomen soft [Non Tender] : non-tender [Non-distended] : non-distended [No Masses] : no abdominal mass palpated [No HSM] : no HSM [Normal Bowel Sounds] : normal bowel sounds [Declined Rectal Exam] : declined rectal exam [Normal Posterior Cervical Nodes] : no posterior cervical lymphadenopathy [Normal Anterior Cervical Nodes] : no anterior cervical lymphadenopathy [No CVA Tenderness] : no CVA  tenderness [No Spinal Tenderness] : no spinal tenderness [No Joint Swelling] : no joint swelling [Grossly Normal Strength/Tone] : grossly normal strength/tone [No Rash] : no rash [Coordination Grossly Intact] : coordination grossly intact [No Focal Deficits] : no focal deficits [Normal Gait] : normal gait [Deep Tendon Reflexes (DTR)] : deep tendon reflexes were 2+ and symmetric [Speech Grossly Normal] : speech grossly normal [Normal Affect] : the affect was normal [Memory Grossly Normal] : memory grossly normal [Alert and Oriented x3] : oriented to person, place, and time [Normal Insight/Judgement] : insight and judgment were intact [No Accessory Muscle Use] : no accessory muscle use [Normal S2] : abnormal S2

## 2021-04-09 NOTE — ASSESSMENT
[FreeTextEntry1] : HFReF clinically stable\par some AS not severe last echo sounds tighter, last echo 12/2020 with PPI adjustment\par HTN ideal control\par stage 4 kidney disease\par normal BM's, drop in H/H, iron ok B12 ok IPEP ok last week\par drop coincides with switch to Xarelto\par refuses rectal

## 2021-04-09 NOTE — REVIEW OF SYSTEMS
[Fever] : no fever [Chills] : no chills [Hot Flashes] : no hot flashes [Night Sweats] : no night sweats [Recent Change In Weight] : ~T no recent weight change [Postnasal Drip] : no postnasal drip [Nasal Discharge] : no nasal discharge [Chest Pain] : no chest pain [Palpitations] : no palpitations [Claudication] : no  leg claudication [Lower Ext Edema] : no lower extremity edema [Orthopena] : no orthopnea [Paroxysmal Nocturnal Dyspnea] : no paroxysmal nocturnal dyspnea [Shortness Of Breath] : no shortness of breath [Wheezing] : no wheezing [Cough] : no cough [Dyspnea on Exertion] : dyspnea on exertion [Abdominal Pain] : no abdominal pain [Nausea] : no nausea [Constipation] : no constipation [Diarrhea] : no diarrhea [Vomiting] : no vomiting [Heartburn] : no heartburn [Melena] : no melena [Dysuria] : no dysuria [Incontinence] : no incontinence [Hesitancy] : no hesitancy [Nocturia] : nocturia [Hematuria] : no hematuria [Frequency] : no frequency [Impotence] : no impotency [Poor Libido] : libido not poor [Joint Stiffness] : no joint stiffness [Back Pain] : back pain [Itching] : itching [Mole Changes] : no mole changes [Nail Changes] : no nail changes [Hair Changes] : no hair changes [Skin Rash] : no skin rash [Headache] : no headache [Dizziness] : no dizziness [Fainting] : no fainting [Confusion] : no confusion [Memory Loss] : no memory loss [Suicidal] : not suicidal [Insomnia] : insomnia [Anxiety] : no anxiety [Depression] : depression [Negative] : Heme/Lymph [de-identified] : better when uses prednisone

## 2021-04-09 NOTE — HISTORY OF PRESENT ILLNESS
[FreeTextEntry1] : here to follow ongoing conditions [de-identified] : upgrade of PPI to biventricular pacer/defibrillator [facility] 7/24/2020\par went in to AF after sotalol changed to metoprolol\par hospitalized 9/2 to 9/4/2020 for CHF exacerbation\par subsequently in ER for weakness due to dehydration 9/11/2020\par long history of COPD and asthma\par history of coronary artery disease and past myocardial infarction in April of 2018 had a stent to a large 90% circumflex lesion with an ejection fraction at that time estimated at 30%\par echocardiogram was done with an ejection fraction of 35-40% 12/23/2019, was 25 - 30 % on echo 5/2020\par Entresto was restarted at lowest dose but after 10 days his potassium increased to 6.3 with his creatinine increasing to 2.91 from 2.15\par renal insufficiency had been stable\par saw CHF specialist Jarrod Hahn\par uses prednisone prn itching, 20 mg dailymost days\par saw Dr Kanner recently for echo guided PPI adjustment optimization 11/24/2020\par saw allergist and started Entresto in his office, no allergic reaction, had ? allergy to valsartan with neck rash 2 years ago,\par patient had significant improvement in his CHF symptoms on Entresto and had no allergic reaction\par Entresto was stopped on 12/19/2020 after bloods showed elevation in K and creatinine\par no longer tolerating 1 teaspoon of Kayexalate\par back on full low dose Entresto now\par saw renal consult Dr Ledesma very depressed after seeing him, will not go to see him anymore, declines any other renal consult\par torsemide increased recently\par normal brown BM's without any blood\par back on Xarelto off warfarin\par no melena, no bloody BM's\par CBC showed drop in H/H last week\par fighting with his wife all the time, feels trapped, does not have anywhere else to move\par stopped his physical therapy due to difficulty breathing when wearing the mask and inability to remove his mask when they're\par shortness of breath is essentially unchanged at this time

## 2021-05-20 PROBLEM — I25.5 ISCHEMIC CARDIOMYOPATHY: Status: ACTIVE | Noted: 2018-11-21

## 2021-05-20 NOTE — HISTORY OF PRESENT ILLNESS
[FreeTextEntry1] : here to follow ongoing conditions\par recent rectal bleeding [de-identified] : upgrade of PPI to biventricular pacer/defibrillator [facility] 7/24/2020\par went in to AF after sotalol changed to metoprolol\par hospitalized 9/2 to 9/4/2020 for CHF exacerbation\par subsequently in ER for weakness due to dehydration 9/11/2020\par long history of COPD and asthma\par history of coronary artery disease and past myocardial infarction in April of 2018 had a stent to a large 90% circumflex lesion with an ejection fraction at that time estimated at 30%\par echocardiogram was done with an ejection fraction of 35-40% 12/23/2019, was 25 - 30 % on echo 5/2020\par Entresto was restarted at lowest dose but after 10 days his potassium increased to 6.3 with his creatinine increasing to 2.91 from 2.15\par renal insufficiency had been stable\par saw CHF specialist Jarrod Hahn\par uses prednisone prn itching, still 20 mg daily on most days\par saw Dr Kanner for echo guided PPI adjustment optimization 11/24/2020\par saw allergist and started Entresto in his office, no allergic reaction, had ? allergy to valsartan with neck rash 2 years ago,\par patient had significant improvement in his CHF symptoms on Entresto and had no allergic reaction\par back on full low dose Entresto now\par saw renal consult Dr Ledesma very depressed after seeing him, will not go to see him anymore, declines any other renal consult\par torsemide increased recently\par normal brown BM's but has minor rectal blood only after BM\par back on Xarelto held since yesterday\par no melena, no bloody BM's\par had some bleeding from anal area, slight, over phone we held Xarelto today\par HTN followed here

## 2021-05-20 NOTE — REVIEW OF SYSTEMS
[Dyspnea on Exertion] : dyspnea on exertion [Nocturia] : nocturia [Back Pain] : back pain [Itching] : itching [Insomnia] : insomnia [Depression] : depression [Negative] : Heme/Lymph [Fever] : no fever [Chills] : no chills [Hot Flashes] : no hot flashes [Night Sweats] : no night sweats [Recent Change In Weight] : ~T no recent weight change [Postnasal Drip] : no postnasal drip [Nasal Discharge] : no nasal discharge [Chest Pain] : no chest pain [Palpitations] : no palpitations [Claudication] : no  leg claudication [Lower Ext Edema] : no lower extremity edema [Orthopena] : no orthopnea [Paroxysmal Nocturnal Dyspnea] : no paroxysmal nocturnal dyspnea [Shortness Of Breath] : no shortness of breath [Wheezing] : no wheezing [Cough] : no cough [Abdominal Pain] : no abdominal pain [Nausea] : no nausea [Constipation] : no constipation [Diarrhea] : no diarrhea [Vomiting] : no vomiting [Heartburn] : no heartburn [Melena] : no melena [Dysuria] : no dysuria [Incontinence] : no incontinence [Hesitancy] : no hesitancy [Hematuria] : no hematuria [Frequency] : no frequency [Impotence] : no impotency [Poor Libido] : libido not poor [Joint Stiffness] : no joint stiffness [Mole Changes] : no mole changes [Nail Changes] : no nail changes [Hair Changes] : no hair changes [Skin Rash] : no skin rash [Headache] : no headache [Dizziness] : no dizziness [Fainting] : no fainting [Confusion] : no confusion [Memory Loss] : no memory loss [Suicidal] : not suicidal [Anxiety] : no anxiety

## 2021-05-20 NOTE — PHYSICAL EXAM
[No Acute Distress] : no acute distress [Well Nourished] : well nourished [Well Developed] : well developed [Well-Appearing] : well-appearing [Normal Sclera/Conjunctiva] : normal sclera/conjunctiva [Normal Outer Ear/Nose] : the outer ears and nose were normal in appearance [No JVD] : no jugular venous distention [No Lymphadenopathy] : no lymphadenopathy [Supple] : supple [No Respiratory Distress] : no respiratory distress  [No Accessory Muscle Use] : no accessory muscle use [Clear to Auscultation] : lungs were clear to auscultation bilaterally [Normal Rate] : normal [Heart Rate ___] : [unfilled] bpm [Rhythm Regular] : regular [Normal S1] : normal S1 [A2 Diminished] : had a diminished A2 [Distant] : the heart sounds were distant [I] : a grade 1 [No Carotid Bruits] : no carotid bruits [No Abdominal Bruit] : a ~M bruit was not heard ~T in the abdomen [No Varicosities] : no varicosities [Pedal Pulses Present] : the pedal pulses are present [No Palpable Aorta] : no palpable aorta [No Extremity Clubbing/Cyanosis] : no extremity clubbing/cyanosis [___ +] : bilateral [unfilled]U+ pretibial pitting edema [Soft] : abdomen soft [Non Tender] : non-tender [Non-distended] : non-distended [No Masses] : no abdominal mass palpated [No HSM] : no HSM [Normal Bowel Sounds] : normal bowel sounds [Normal Sphincter Tone] : normal sphincter tone [No Mass] : no mass [Normal Posterior Cervical Nodes] : no posterior cervical lymphadenopathy [Normal Anterior Cervical Nodes] : no anterior cervical lymphadenopathy [No CVA Tenderness] : no CVA  tenderness [No Spinal Tenderness] : no spinal tenderness [No Joint Swelling] : no joint swelling [Grossly Normal Strength/Tone] : grossly normal strength/tone [No Rash] : no rash [Coordination Grossly Intact] : coordination grossly intact [No Focal Deficits] : no focal deficits [Normal Gait] : normal gait [Deep Tendon Reflexes (DTR)] : deep tendon reflexes were 2+ and symmetric [Speech Grossly Normal] : speech grossly normal [Memory Grossly Normal] : memory grossly normal [Normal Affect] : the affect was normal [Alert and Oriented x3] : oriented to person, place, and time [Normal Insight/Judgement] : insight and judgment were intact [Normal S2] : abnormal S2 [Stool Occult Blood] : stool negative for occult blood [FreeTextEntry1] : no bleeding site visualized

## 2021-05-20 NOTE — ASSESSMENT
[FreeTextEntry1] : HFReF clinically stable\par some AS not severe last echo \par HTN good control\par stage 4 kidney disease\par normal BM's, but rectal bleeding only after BM\par rectal ok, guaiac negative stool

## 2021-05-28 PROBLEM — E11.9 DIABETES MELLITUS: Status: ACTIVE | Noted: 2018-11-21

## 2021-05-28 PROBLEM — K62.5 RECTAL BLEEDING: Status: ACTIVE | Noted: 2021-01-01

## 2021-05-28 PROBLEM — Z95.5 S/P DRUG ELUTING CORONARY STENT PLACEMENT: Status: ACTIVE | Noted: 2018-11-21

## 2021-05-28 NOTE — HISTORY OF PRESENT ILLNESS
[FreeTextEntry1] : Here to follow multiple ongoing conditions [de-identified] : upgrade of PPI to biventricular pacer/defibrillator [facility] 7/24/2020\par went in to AF after sotalol changed to metoprolol\par hospitalized 9/2 to 9/4/2020 for CHF exacerbation\par subsequently in ER for weakness due to dehydration 9/11/2020\par long history of COPD and asthma\par history of coronary artery disease and past myocardial infarction in April of 2018 had a stent to a large 90% circumflex lesion with an ejection fraction at that time estimated at 30%\par echocardiogram was done with an ejection fraction of 35-40% 12/23/2019, was 25 - 30 % on echo 5/2020\par Entresto was restarted at lowest dose but after 10 days his potassium increased to 6.3 with his creatinine increasing to 2.91 from 2.15\par renal insufficiency had been stable\par saw CHF specialist Jarrod Hahn\par uses prednisone prn itching, still 20 mg daily on most days\par saw Dr Kanner for echo guided PPI adjustment optimization 11/24/2020\par saw allergist and started Entresto in his office, no allergic reaction, had ? allergy to valsartan with neck rash 2 years ago,\par patient had significant improvement in his CHF symptoms on Entresto and had no allergic reaction\par back on full low dose Entresto now\par saw renal consult Dr Ledesma very depressed after seeing him, will not go to see him anymore, declines any other renal consult\par torsemide increased recently\par normal brown BM's but had minor rectal blood only after BM\par Xarelto held since last week, no further bleeding\par no melena, no bloody BM's\par BNP was a bit higher counts ok iron low\par torsemide was increased\par long hx HTN followed here doing well

## 2021-05-28 NOTE — PHYSICAL EXAM
[No Acute Distress] : no acute distress [Well Nourished] : well nourished [Well Developed] : well developed [Well-Appearing] : well-appearing [Normal Sclera/Conjunctiva] : normal sclera/conjunctiva [Normal Outer Ear/Nose] : the outer ears and nose were normal in appearance [No JVD] : no jugular venous distention [No Lymphadenopathy] : no lymphadenopathy [Supple] : supple [No Respiratory Distress] : no respiratory distress  [No Accessory Muscle Use] : no accessory muscle use [Clear to Auscultation] : lungs were clear to auscultation bilaterally [Normal Rate] : normal [Heart Rate ___] : [unfilled] bpm [Rhythm Regular] : regular [Normal S1] : normal S1 [A2 Diminished] : had a diminished A2 [Distant] : the heart sounds were distant [I] : a grade 1 [No Carotid Bruits] : no carotid bruits [No Abdominal Bruit] : a ~M bruit was not heard ~T in the abdomen [No Varicosities] : no varicosities [Pedal Pulses Present] : the pedal pulses are present [No Edema] : there was no peripheral edema [No Palpable Aorta] : no palpable aorta [No Extremity Clubbing/Cyanosis] : no extremity clubbing/cyanosis [Soft] : abdomen soft [Non Tender] : non-tender [Non-distended] : non-distended [No Masses] : no abdominal mass palpated [No HSM] : no HSM [Normal Bowel Sounds] : normal bowel sounds [Normal Posterior Cervical Nodes] : no posterior cervical lymphadenopathy [Normal Anterior Cervical Nodes] : no anterior cervical lymphadenopathy [No CVA Tenderness] : no CVA  tenderness [No Spinal Tenderness] : no spinal tenderness [No Joint Swelling] : no joint swelling [Grossly Normal Strength/Tone] : grossly normal strength/tone [No Rash] : no rash [Coordination Grossly Intact] : coordination grossly intact [No Focal Deficits] : no focal deficits [Normal Gait] : normal gait [Deep Tendon Reflexes (DTR)] : deep tendon reflexes were 2+ and symmetric [Speech Grossly Normal] : speech grossly normal [Memory Grossly Normal] : memory grossly normal [Normal Affect] : the affect was normal [Alert and Oriented x3] : oriented to person, place, and time [Normal Insight/Judgement] : insight and judgment were intact [Normal S2] : abnormal S2

## 2021-05-28 NOTE — ASSESSMENT
[FreeTextEntry1] : Looks much better today feels better restarted his warfarin last night\par At this time we'll restart his duloxetine at the 20 mg dose\par We will stay on warfarin 5 mg a day and check his INR and other bloods in one week

## 2021-06-04 PROBLEM — N18.4 STAGE 4 CHRONIC KIDNEY DISEASE: Status: ACTIVE | Noted: 2020-01-01

## 2021-06-04 PROBLEM — I50.9 CHF (CONGESTIVE HEART FAILURE): Status: ACTIVE | Noted: 2018-11-21

## 2021-06-04 PROBLEM — J44.9 COPD (CHRONIC OBSTRUCTIVE PULMONARY DISEASE): Status: ACTIVE | Noted: 2018-11-21

## 2021-06-04 PROBLEM — I95.9 HYPOTENSION: Status: ACTIVE | Noted: 2021-01-01

## 2021-06-04 PROBLEM — I48.0 PAROXYSMAL ATRIAL FIBRILLATION: Status: ACTIVE | Noted: 2020-02-04

## 2021-06-04 PROBLEM — Z79.01 WARFARIN ANTICOAGULATION: Status: ACTIVE | Noted: 2018-11-21

## 2021-06-04 NOTE — ASSESSMENT
[FreeTextEntry1] : Hypotensive weak and ill-appearing\par Having shoulder pain but no chest pain\par Very dehydrated on appearance\par Known congestive heart failure and stage IV chronic kidney disease\par Depressed but not suicidal

## 2021-06-04 NOTE — PLAN
[FreeTextEntry1] : Ambulance called to bring patient to hospital\par Given his complex medications and hypotension and known significant heart and kidney disease as well as his INR being too high likely dueto very little oral intake over the last week and his diuretic and other medications there is no reasonable way he can be managed as an outpatient

## 2021-06-04 NOTE — REVIEW OF SYSTEMS
[Fever] : no fever [Hot Flashes] : no hot flashes [Chills] : no chills [Night Sweats] : no night sweats [Recent Change In Weight] : ~T recent weight change [Postnasal Drip] : no postnasal drip [Nasal Discharge] : no nasal discharge [Chest Pain] : no chest pain [Palpitations] : no palpitations [Claudication] : no  leg claudication [Lower Ext Edema] : no lower extremity edema [Orthopena] : no orthopnea [Paroxysmal Nocturnal Dyspnea] : no paroxysmal nocturnal dyspnea [Shortness Of Breath] : no shortness of breath [Wheezing] : no wheezing [Cough] : no cough [Dyspnea on Exertion] : dyspnea on exertion [Abdominal Pain] : no abdominal pain [Nausea] : no nausea [Constipation] : no constipation [Diarrhea] : no diarrhea [Heartburn] : no heartburn [Vomiting] : no vomiting [Melena] : no melena [Dysuria] : no dysuria [Incontinence] : no incontinence [Hesitancy] : no hesitancy [Nocturia] : nocturia [Hematuria] : no hematuria [Frequency] : no frequency [Impotence] : no impotency [Poor Libido] : libido not poor [Joint Stiffness] : no joint stiffness [Muscle Pain] : muscle pain [Back Pain] : back pain [Itching] : itching [Mole Changes] : no mole changes [Nail Changes] : no nail changes [Hair Changes] : no hair changes [Skin Rash] : no skin rash [Headache] : no headache [Dizziness] : no dizziness [Fainting] : no fainting [Confusion] : no confusion [Memory Loss] : no memory loss [Suicidal] : not suicidal [Insomnia] : no insomnia [Anxiety] : no anxiety [Depression] : depression [Negative] : Heme/Lymph

## 2021-06-04 NOTE — PHYSICAL EXAM
[Well Developed] : well developed [Ill-Appearing] : ill-appearing [Normal Sclera/Conjunctiva] : normal sclera/conjunctiva [Normal Outer Ear/Nose] : the outer ears and nose were normal in appearance [No JVD] : no jugular venous distention [No Lymphadenopathy] : no lymphadenopathy [Supple] : supple [No Respiratory Distress] : no respiratory distress  [No Accessory Muscle Use] : no accessory muscle use [Clear to Auscultation] : lungs were clear to auscultation bilaterally [Normal Rate] : normal [Heart Rate ___] : [unfilled] bpm [Rhythm Regular] : regular [Normal S1] : normal S1 [Normal S2] : abnormal S2 [A2 Diminished] : had a diminished A2 [Distant] : the heart sounds were distant [I] : a grade 1 [No Carotid Bruits] : no carotid bruits [No Abdominal Bruit] : a ~M bruit was not heard ~T in the abdomen [No Varicosities] : no varicosities [Pedal Pulses Present] : the pedal pulses are present [No Edema] : there was no peripheral edema [No Palpable Aorta] : no palpable aorta [No Extremity Clubbing/Cyanosis] : no extremity clubbing/cyanosis [Soft] : abdomen soft [Non Tender] : non-tender [Non-distended] : non-distended [No Masses] : no abdominal mass palpated [No HSM] : no HSM [Normal Bowel Sounds] : normal bowel sounds [Normal Posterior Cervical Nodes] : no posterior cervical lymphadenopathy [Normal Anterior Cervical Nodes] : no anterior cervical lymphadenopathy [No CVA Tenderness] : no CVA  tenderness [No Spinal Tenderness] : no spinal tenderness [No Joint Swelling] : no joint swelling [Grossly Normal Strength/Tone] : grossly normal strength/tone [No Rash] : no rash [Coordination Grossly Intact] : coordination grossly intact [No Focal Deficits] : no focal deficits [Normal Gait] : normal gait [Deep Tendon Reflexes (DTR)] : deep tendon reflexes were 2+ and symmetric [Speech Grossly Normal] : speech grossly normal [Memory Grossly Normal] : memory grossly normal [Normal Affect] : the affect was normal [Alert and Oriented x3] : oriented to person, place, and time [Normal Insight/Judgement] : insight and judgment were intact

## 2021-06-04 NOTE — HISTORY OF PRESENT ILLNESS
[FreeTextEntry1] : multiple ongoing conditions [de-identified] : upgrade of PPI to biventricular pacer/defibrillator [facility] 7/24/2020\par went in to AF after sotalol changed to metoprolol\par hospitalized 9/2 to 9/4/2020 for CHF exacerbation\par subsequently in ER for weakness due to dehydration 9/11/2020\par long history of COPD and asthma\par history of coronary artery disease and past myocardial infarction in April of 2018 had a stent to a large 90% circumflex lesion with an ejection fraction at that time estimated at 30%\par echocardiogram was done with an ejection fraction of 35-40% 12/23/2019, was 25 - 30 % on echo 5/2020\par Entresto was restarted at lowest dose but after 10 days his potassium increased to 6.3 with his creatinine increasing to 2.91 from 2.15\par renal insufficiency had been stable\par saw CHF specialist Jarrod Hahn\par uses prednisone prn itching, still 20 mg daily on most days\par saw Dr Kanner for echo guided PPI adjustment optimization 11/24/2020\par saw allergist and started Entresto in his office, no allergic reaction, had ? allergy to valsartan with neck rash 2 years ago,\par patient had significant improvement in his CHF symptoms on Entresto and had no allergic reaction\par back on full low dose Entresto now\par saw renal consult Dr Ledesma very depressed after seeing him, will not go to see him anymore, declines any other renal consult\par torsemide increased recently\par normal brown BM's but had minor rectal blood only after BM\par Xarelto held since then no further bleeding\par no melena, no bloody BM's\par BNP was a bit higher counts ok iron low\par torsemide was increased\par long hx HTN followed here doing well\par warfarin restarted last week 5mg daily\par duloxetine restarted at 20 mg dose last week, not tolerated, had diarrhea, a few times\par wife gave him Imodium, no further diarrhea\par not eating much\par depressed, no appetite\par very weak\par